# Patient Record
Sex: FEMALE | Race: BLACK OR AFRICAN AMERICAN | Employment: OTHER | URBAN - METROPOLITAN AREA
[De-identification: names, ages, dates, MRNs, and addresses within clinical notes are randomized per-mention and may not be internally consistent; named-entity substitution may affect disease eponyms.]

---

## 2017-06-22 ENCOUNTER — TELEPHONE (OUTPATIENT)
Dept: HEMATOLOGY | Age: 72
End: 2017-06-22

## 2017-06-22 NOTE — TELEPHONE ENCOUNTER
Called to remind patient about their appointment on 06/28/2017 3:00 pm at the [facility] in [facility].  Patient confirmed understanding.

## 2017-06-28 ENCOUNTER — HOSPITAL ENCOUNTER (OUTPATIENT)
Dept: LAB | Age: 72
Discharge: HOME | End: 2017-06-28

## 2017-06-28 ENCOUNTER — OFFICE VISIT (OUTPATIENT)
Dept: HEMATOLOGY | Age: 72
End: 2017-06-28

## 2017-06-28 VITALS
RESPIRATION RATE: 18 BRPM | HEART RATE: 82 BPM | OXYGEN SATURATION: 98 % | SYSTOLIC BLOOD PRESSURE: 110 MMHG | WEIGHT: 164.8 LBS | DIASTOLIC BLOOD PRESSURE: 55 MMHG | TEMPERATURE: 98.9 F | BODY MASS INDEX: 25.87 KG/M2 | HEIGHT: 67 IN

## 2017-06-28 DIAGNOSIS — K75.4 AUTOIMMUNE HEPATITIS (HCC): Primary | ICD-10-CM

## 2017-06-28 PROBLEM — I10 HYPERTENSION: Status: ACTIVE | Noted: 2017-06-28

## 2017-06-28 PROCEDURE — 99001 SPECIMEN HANDLING PT-LAB: CPT | Performed by: INTERNAL MEDICINE

## 2017-06-28 RX ORDER — PREDNISONE 20 MG/1
TABLET ORAL
COMMUNITY
End: 2017-06-28 | Stop reason: CLARIF

## 2017-06-28 RX ORDER — MYCOPHENOLATE MOFETIL 500 MG/1
500 TABLET ORAL 2 TIMES DAILY
COMMUNITY
End: 2018-06-07 | Stop reason: DRUGHIGH

## 2017-06-28 RX ORDER — HYDROCHLOROTHIAZIDE 12.5 MG/1
12.5 TABLET ORAL DAILY
COMMUNITY

## 2017-06-28 NOTE — PROGRESS NOTES
[address] [facility] MD Amando, ABRAHAM Manrique, MITUL Barillas NP        at [address]     [address] [place] [address], [address] Yanet Padron  22.     [phone]     FAX: [phone]    at [address], [address] [place] [address], [address]     [phone]     FAX: [phone]         Patient Care Team:  Eliza Ruiz MD as PCP - General (Family Practice)      Problem List  Date Reviewed: 6/28/2017          Codes Class Noted    Autoimmune hepatitis [facility]) ICD-10-CM: K75.4  ICD-9-CM: 571.42  6/28/2017        Hypertension ICD-10-CM: I10  ICD-9-CM: 401.9  6/28/2017                The physicians listed above have asked me to see Ngozi Alamo in consultation regarding Autoimmune Hepatitis. All medical records sent by the referring physicians were reviewed including imaging studies and pathology. The patient is a 80 y.o. Black female who was first noted to have abnormalities in liver transaminases in 2012 and then developed more profound elevation in liver enzymes and jaundice while she was visiting family in [facility]. She was hospitalized and transferred to [facility]. A liver biopsy was performed. The findings were consistent with severe AIH and she was treated with high prednisone and cellcept. She then moved back to [facility] and prednisone was eventually tapered off. She remains on low dose cellcept. Further evaluation of these abnormalities is not available to me at this time. She moved to [facility] in 2016. She had a Fibroscan in [place]. This demosntrated liver stiffness consistent with  No fibrosis.     The most recent laboratory studies indicate that the liver transaminases are normal, ALP is normal, tests of hepatic synthetic and metabolic function are   normal,     The patient has no symptoms which could be attributed to the liver disorder. The patient completes all daily activities without any functional limitations. The patient has not experienced fatigue, pain in the right side over the liver,     ALLERGIES  No Known Allergies    MEDICATIONS  Current Outpatient Prescriptions   Medication Sig    hydroCHLOROthiazide (HYDRODIURIL) 12.5 mg tablet Take 12.5 mg by mouth daily.  mycophenolate (CELLCEPT) 500 mg tablet Take 500 mg by mouth two (2) times a day. No current facility-administered medications for this visit. SYSTEM REVIEW NOT RELATED TO LIVER DISEASE OR REVIEWED ABOVE:  Constitution systems: Negative for fever, chills, weight gain, weight loss. Eyes: Negative for visual changes. ENT: Negative for sore throat, painful swallowing. Respiratory: Negative for cough, hemoptysis, SOB. Cardiology: Negative for chest pain, palpitations. GI:  Negative for constipation or diarrhea. : Negative for urinary frequency, dysuria, hematuria, nocturia. Skin: Negative for rash. Hematology: Negative for easy bruising, blood clots. Musculo-skelatal: Negative for back pain, muscle pain, weakness. Neurologic: Negative for headaches, dizziness, vertigo, memory problems not related to HE. Psychology: Negative for anxiety, depression. FAMILY HISTORY:  The father  of COPD. The mother  of breast cancer. There is no family history of liver disease. There is no family history of immune disorders. SOCIAL HISTORY:  The patient is . The patient has 1 child. The patient has never used tobacco products. The patient has never consumed significant amounts of alcohol. The patient used to work as a . The patient retired in .         PHYSICAL EXAMINATION:  Visit Vitals    /55 (BP 1 Location: Left arm, BP Patient Position: Sitting)    Pulse 82    Temp 98.9 °F (37.2 °C) (Tympanic)    Resp 18    Ht 5' 7\" (1.702 m)    Wt 164 lb 12.8 oz (74.8 kg)    SpO2 98%    BMI 25.81 kg/m2     General: No acute distress. Eyes: Sclera anicteric. ENT: No oral lesions. Thyroid normal.  Nodes: No adenopathy. Skin: No spider angiomata. No jaundice. No palmar erythema. Respiratory: Lungs clear to auscultation. Cardiovascular: Regular heart rate. No murmurs. No JVD. Abdomen: Soft non-tender. Liver size normal to percussion/palpation. Spleen not palpable. No obvious ascites. Extremities: No edema. No muscle wasting. No gross arthritic changes. Neurologic: Alert and oriented. Cranial nerves grossly intact. No asterixsis. LABORATORY STUDIES:  From 4/2016  AST/ALT/ALP/T Bili/ALB:  15/10/74/0.5/4.5  WBC/HB/PLT/INR:  5.9/12.2/225  BUN/CREAT:  10/0.79    SEROLOGIES:  Not available or performed. Testing will be performed as indicated. LIVER HISTOLOGY:  6/2012. Liver biopsy at [facility]. Severe hepatitis with cholestasis consistent with AIH.    1/2017. Fiboscan at [facility]. Consistent with F0. ENDOSCOPIC PROCEDURES:  Not available or performed    RADIOLOGY:  2/2012. Ultrasound of liver. Echogenic consistent with chronic liver disease. No liver mass lesions. No dilated bile ducts. No ascites. 5/2016. Ultrasound of liver. Echogenic consistent with chronic liver disease. No liver mass lesions. No dilated bile ducts. No ascites. OTHER TESTING:  Not available or performed    ASSESSMENT AND PLAN:  Autoimmune Hepatitis by liver biopsy in 2012. Liver transaminases are now normal.  Alkaline phosphate is normal.  Liver function is normal.  The platelet count is normal.      Based upon laboratory studies, Fibroscan and imaging  the patient may have none or some fibrosis. But this appears to be minimal.    The patient is receiving treatment with Cellcept. The patient is responding to and is tolerating the treatment without significant side effects. Will perform laboratory testing to monitor liver function and degree of liver injury.   This will include BMP, hepatic panel, CBC with platelet count,     Will perform serologic and virologic studies to assess for other causes of chronic liver disease. Will perform imaging of the liver with ultrasound. There is no reason to perform liver biopsy at this time. The patient was directed to continue all current medications at the current dosages. There are no contraindications for the patient to take any medications that are necessary for treatment of other medical issues. The patient was counseled regarding alcohol consumption. The need for vaccination against viral hepatitis A and B will be assessed with serologic and instituted as appropriate. All of the above issues were discussed with the patient. All questions were answered. The patient expressed a clear understanding of the above. [address] [place] [address] in 3 months to review all data and determine the treatment plan.      Elizabeth Washburn MD  [facility] [place] of [address] [place] [address] Tommy Ambreen Plata, [address]  [phone]

## 2017-06-28 NOTE — MR AVS SNAPSHOT
Visit Information Date & Time Provider Department Dept. Phone Encounter #  
 6/28/2017  3:00 PM Norlene Leyden, MD [facility] [place] [phone] [id number] Follow-up Instructions Return in about 3 months (around 9/28/2017) for MLS. Upcoming Health Maintenance Date Due Hepatitis C Screening 1945 DTaP/Tdap/Td series (1 - Tdap) 9/30/1967 BREAST CANCER SCRN MAMMOGRAM 9/30/1996 FOBT Q 1 YEAR AGE 50-75 9/30/1996 ZOSTER VACCINE AGE 60> 9/30/2006 GLAUCOMA SCREENING Q2Y 9/30/2011 OSTEOPOROSIS SCREENING (DEXA) 9/30/2011 Pneumococcal 65+ Low/Medium Risk (1 of 2 - PCV13) 9/30/2011 MEDICARE YEARLY EXAM 9/30/2011 INFLUENZA AGE 9 TO ADULT 8/1/2017 Allergies as of 6/28/2017  Review Complete On: 6/28/2017 By: Norlene Leyden, MD  
 No Known Allergies Current Immunizations  Never Reviewed No immunizations on file. Not reviewed this visit You Were Diagnosed With   
  
 Codes Comments [facility]    -  Primary ICD-10-CM: K75.4 ICD-9-CM: 571.42 Vitals BP Pulse Temp Resp Height(growth percentile) 110/55 (BP 1 Location: Left arm, BP Patient Position: Sitting) 82 98.9 °F (37.2 °C) (Tympanic) 18 5' 7\" (1.702 m) Weight(growth percentile) SpO2 BMI OB Status Smoking Status 164 lb 12.8 oz (74.8 kg) 98% 25.81 kg/m2 Menopause Former Smoker Vitals History BMI and BSA Data Body Mass Index Body Surface Area  
 25.81 kg/m 2 1.88 m 2 Your Updated Medication List  
  
   
This list is accurate as of: 6/28/17  3:30 PM.  Always use your most recent med list.  
  
  
  
  
 hydroCHLOROthiazide 12.5 mg tablet Commonly known as:  HYDRODIURIL Take 12.5 mg by mouth daily. mycophenolate 500 mg tablet Commonly known as:  CELLCEPT Take 500 mg by mouth two (2) times a day. Follow-up Instructions Return in about 3 months (around 9/28/2017) for MLS. To-Do List   
 06/28/2017 Lab:  ACTIN (SMOOTH MUSCLE) ANTIBODY   
  
 06/28/2017 Lab:  ANTINUCLEAR ANTIBODIES, IFA   
  
 06/28/2017 Lab:  CBC WITH AUTOMATED DIFF   
  
 06/28/2017 Lab:  HEPATIC FUNCTION PANEL   
  
 06/28/2017 Lab:  METABOLIC PANEL, BASIC Introducing [facility]! Apoorva Russ introduces MeeWee patient portal. Now you can access parts of your medical record, email your doctor's office, and request medication refills online. 1. In your internet browser, go to [email or web address] 2. Click on the First Time User? Click Here link in the Sign In box. You will see the New Member Sign Up page. 3. Enter your MeeWee Access Code exactly as it appears below. You will not need to use this code after youve completed the sign-up process. If you do not sign up before the expiration date, you must request a new code. · MeeWee Access Code: [email or web address] Expires: 9/26/2017  1:59 PM 
 
4. Enter the last four digits of your Social Security Number (xxxx) and Date of Birth (mm/dd/yyyy) as indicated and click Submit. You will be taken to the next sign-up page. 5. Create a MeeWee ID. This will be your MeeWee login ID and cannot be changed, so think of one that is secure and easy to remember. 6. Create a MeeWee password. You can change your password at any time. 7. Enter your Password Reset Question and Answer. This can be used at a later time if you forget your password. 8. Enter your e-mail address. You will receive e-mail notification when new information is available in [address] 9. Click Sign Up. You can now view and download portions of your medical record. 10. Click the Download Summary menu link to download a portable copy of your medical information. If you have questions, please visit the Frequently Asked Questions section of the MeeWee website. Remember, MeeWee is NOT to be used for urgent needs. For medical emergencies, dial 911. Now available from your iPhone and Android! Please provide this summary of care documentation to your next provider. Your primary care clinician is listed as Paris Starks. If you have any questions after today's visit, please call [phone].

## 2017-06-30 LAB
ACTIN IGG SERPL-ACNC: 31 UNITS (ref 0–19)
ALBUMIN SERPL-MCNC: 4.3 G/DL (ref 3.5–4.8)
ALP SERPL-CCNC: 85 IU/L (ref 39–117)
ALT SERPL-CCNC: 12 IU/L (ref 0–32)
ANA TITR SER IF: NEGATIVE {TITER}
AST SERPL-CCNC: 16 IU/L (ref 0–40)
BASOPHILS # BLD AUTO: 0 X10E3/UL (ref 0–0.2)
BASOPHILS NFR BLD AUTO: 0 %
BILIRUB DIRECT SERPL-MCNC: 0.08 MG/DL (ref 0–0.4)
BILIRUB SERPL-MCNC: 0.2 MG/DL (ref 0–1.2)
BUN SERPL-MCNC: 17 MG/DL (ref 8–27)
BUN/CREAT SERPL: 22 (ref 12–28)
CALCIUM SERPL-MCNC: 10 MG/DL (ref 8.7–10.3)
CHLORIDE SERPL-SCNC: 100 MMOL/L (ref 96–106)
CO2 SERPL-SCNC: 25 MMOL/L (ref 18–29)
CREAT SERPL-MCNC: 0.77 MG/DL (ref 0.57–1)
EOSINOPHIL # BLD AUTO: 0.1 X10E3/UL (ref 0–0.4)
EOSINOPHIL NFR BLD AUTO: 2 %
ERYTHROCYTE [DISTWIDTH] IN BLOOD BY AUTOMATED COUNT: 14.9 % (ref 12.3–15.4)
GLUCOSE SERPL-MCNC: 91 MG/DL (ref 65–99)
HCT VFR BLD AUTO: 38.5 % (ref 34–46.6)
HGB BLD-MCNC: 12.1 G/DL (ref 11.1–15.9)
IMM GRANULOCYTES # BLD: 0 X10E3/UL (ref 0–0.1)
IMM GRANULOCYTES NFR BLD: 0 %
LYMPHOCYTES # BLD AUTO: 1.8 X10E3/UL (ref 0.7–3.1)
LYMPHOCYTES NFR BLD AUTO: 25 %
MCH RBC QN AUTO: 26.5 PG (ref 26.6–33)
MCHC RBC AUTO-ENTMCNC: 31.4 G/DL (ref 31.5–35.7)
MCV RBC AUTO: 84 FL (ref 79–97)
MONOCYTES # BLD AUTO: 0.7 X10E3/UL (ref 0.1–0.9)
MONOCYTES NFR BLD AUTO: 11 %
NEUTROPHILS # BLD AUTO: 4.3 X10E3/UL (ref 1.4–7)
NEUTROPHILS NFR BLD AUTO: 62 %
PLATELET # BLD AUTO: 253 X10E3/UL (ref 150–379)
POTASSIUM SERPL-SCNC: 4.5 MMOL/L (ref 3.5–5.2)
PROT SERPL-MCNC: 7.3 G/DL (ref 6–8.5)
RBC # BLD AUTO: 4.57 X10E6/UL (ref 3.77–5.28)
SODIUM SERPL-SCNC: 139 MMOL/L (ref 134–144)
WBC # BLD AUTO: 7 X10E3/UL (ref 3.4–10.8)

## 2017-10-03 ENCOUNTER — HOSPITAL ENCOUNTER (OUTPATIENT)
Dept: LAB | Age: 72
Discharge: HOME | End: 2017-10-03

## 2017-10-03 PROCEDURE — 99001 SPECIMEN HANDLING PT-LAB: CPT | Performed by: INTERNAL MEDICINE

## 2017-10-04 LAB
ACTIN IGG SERPL-ACNC: NORMAL
ALBUMIN SERPL-MCNC: 4.4 G/DL (ref 3.5–4.8)
ALP SERPL-CCNC: 84 IU/L (ref 39–117)
ALT SERPL-CCNC: 12 IU/L (ref 0–32)
ANA TITR SER IF: NORMAL {TITER}
AST SERPL-CCNC: 17 IU/L (ref 0–40)
BASOPHILS # BLD AUTO: 0 X10E3/UL (ref 0–0.2)
BASOPHILS NFR BLD AUTO: 0 %
BILIRUB DIRECT SERPL-MCNC: 0.11 MG/DL (ref 0–0.4)
BILIRUB SERPL-MCNC: 0.4 MG/DL (ref 0–1.2)
BUN SERPL-MCNC: 12 MG/DL (ref 8–27)
BUN/CREAT SERPL: 15 (ref 12–28)
CALCIUM SERPL-MCNC: 10.2 MG/DL (ref 8.7–10.3)
CHLORIDE SERPL-SCNC: 102 MMOL/L (ref 96–106)
CO2 SERPL-SCNC: 27 MMOL/L (ref 18–29)
CREAT SERPL-MCNC: 0.81 MG/DL (ref 0.57–1)
EOSINOPHIL # BLD AUTO: 0.1 X10E3/UL (ref 0–0.4)
EOSINOPHIL NFR BLD AUTO: 2 %
ERYTHROCYTE [DISTWIDTH] IN BLOOD BY AUTOMATED COUNT: 15.2 % (ref 12.3–15.4)
GLUCOSE SERPL-MCNC: 89 MG/DL (ref 65–99)
HCT VFR BLD AUTO: 40.7 % (ref 34–46.6)
HGB BLD-MCNC: 12.9 G/DL (ref 11.1–15.9)
IMM GRANULOCYTES # BLD: 0 X10E3/UL (ref 0–0.1)
IMM GRANULOCYTES NFR BLD: 0 %
LYMPHOCYTES # BLD AUTO: 1.5 X10E3/UL (ref 0.7–3.1)
LYMPHOCYTES NFR BLD AUTO: 30 %
MCH RBC QN AUTO: 26.3 PG (ref 26.6–33)
MCHC RBC AUTO-ENTMCNC: 31.7 G/DL (ref 31.5–35.7)
MCV RBC AUTO: 83 FL (ref 79–97)
MONOCYTES # BLD AUTO: 0.7 X10E3/UL (ref 0.1–0.9)
MONOCYTES NFR BLD AUTO: 15 %
NEUTROPHILS # BLD AUTO: 2.6 X10E3/UL (ref 1.4–7)
NEUTROPHILS NFR BLD AUTO: 53 %
PLATELET # BLD AUTO: 247 X10E3/UL (ref 150–379)
POTASSIUM SERPL-SCNC: 5 MMOL/L (ref 3.5–5.2)
PROT SERPL-MCNC: 7.9 G/DL (ref 6–8.5)
RBC # BLD AUTO: 4.9 X10E6/UL (ref 3.77–5.28)
SODIUM SERPL-SCNC: 143 MMOL/L (ref 134–144)
WBC # BLD AUTO: 4.9 X10E3/UL (ref 3.4–10.8)

## 2017-10-05 LAB
ACTIN IGG SERPL-ACNC: 27 UNITS (ref 0–19)
ALBUMIN SERPL-MCNC: 4.4 G/DL (ref 3.5–4.8)
ALP SERPL-CCNC: 84 IU/L (ref 39–117)
ALT SERPL-CCNC: 12 IU/L (ref 0–32)
ANA SPECKLED TITR SER: ABNORMAL {TITER}
ANA TITR SER IF: POSITIVE {TITER}
AST SERPL-CCNC: 17 IU/L (ref 0–40)
BASOPHILS # BLD AUTO: 0 X10E3/UL (ref 0–0.2)
BASOPHILS NFR BLD AUTO: 0 %
BILIRUB DIRECT SERPL-MCNC: 0.11 MG/DL (ref 0–0.4)
BILIRUB SERPL-MCNC: 0.4 MG/DL (ref 0–1.2)
BUN SERPL-MCNC: 12 MG/DL (ref 8–27)
BUN/CREAT SERPL: 15 (ref 12–28)
CALCIUM SERPL-MCNC: 10.2 MG/DL (ref 8.7–10.3)
CHLORIDE SERPL-SCNC: 102 MMOL/L (ref 96–106)
CO2 SERPL-SCNC: 27 MMOL/L (ref 18–29)
CREAT SERPL-MCNC: 0.81 MG/DL (ref 0.57–1)
EOSINOPHIL # BLD AUTO: 0.1 X10E3/UL (ref 0–0.4)
EOSINOPHIL NFR BLD AUTO: 2 %
ERYTHROCYTE [DISTWIDTH] IN BLOOD BY AUTOMATED COUNT: 15.2 % (ref 12.3–15.4)
GLUCOSE SERPL-MCNC: 89 MG/DL (ref 65–99)
HCT VFR BLD AUTO: 40.7 % (ref 34–46.6)
HGB BLD-MCNC: 12.9 G/DL (ref 11.1–15.9)
IMM GRANULOCYTES # BLD: 0 X10E3/UL (ref 0–0.1)
IMM GRANULOCYTES NFR BLD: 0 %
LYMPHOCYTES # BLD AUTO: 1.5 X10E3/UL (ref 0.7–3.1)
LYMPHOCYTES NFR BLD AUTO: 30 %
Lab: ABNORMAL
MCH RBC QN AUTO: 26.3 PG (ref 26.6–33)
MCHC RBC AUTO-ENTMCNC: 31.7 G/DL (ref 31.5–35.7)
MCV RBC AUTO: 83 FL (ref 79–97)
MONOCYTES # BLD AUTO: 0.7 X10E3/UL (ref 0.1–0.9)
MONOCYTES NFR BLD AUTO: 15 %
NEUTROPHILS # BLD AUTO: 2.6 X10E3/UL (ref 1.4–7)
NEUTROPHILS NFR BLD AUTO: 53 %
PLATELET # BLD AUTO: 247 X10E3/UL (ref 150–379)
POTASSIUM SERPL-SCNC: 5 MMOL/L (ref 3.5–5.2)
PROT SERPL-MCNC: 7.9 G/DL (ref 6–8.5)
RBC # BLD AUTO: 4.9 X10E6/UL (ref 3.77–5.28)
SODIUM SERPL-SCNC: 143 MMOL/L (ref 134–144)
WBC # BLD AUTO: 4.9 X10E3/UL (ref 3.4–10.8)

## 2017-10-18 ENCOUNTER — OFFICE VISIT (OUTPATIENT)
Dept: HEMATOLOGY | Age: 72
End: 2017-10-18

## 2017-10-18 VITALS
SYSTOLIC BLOOD PRESSURE: 131 MMHG | HEIGHT: 67 IN | TEMPERATURE: 99 F | DIASTOLIC BLOOD PRESSURE: 76 MMHG | BODY MASS INDEX: 24.64 KG/M2 | HEART RATE: 74 BPM | RESPIRATION RATE: 16 BRPM | OXYGEN SATURATION: 97 % | WEIGHT: 157 LBS

## 2017-10-18 DIAGNOSIS — K75.4 AUTOIMMUNE HEPATITIS (HCC): Primary | ICD-10-CM

## 2017-10-18 RX ORDER — AZITHROMYCIN 250 MG/1
TABLET, FILM COATED ORAL
Refills: 0 | COMMUNITY
Start: 2017-10-17 | End: 2018-11-15

## 2017-10-18 NOTE — PROGRESS NOTES
Patient states that she had multiple bruises that lasted over 3 days with no particular onset. Marika Zuniga is a 71 y.o. female    No chief complaint on file. 1. Have you been to the ER, urgent care clinic or hospitalized since your last visit? NO.     2. Have you seen or consulted any other health care providers outside of the [address] [place] [address] since your last visit (Include any pap smears or colon screening)?  NO  Learning Assessment 6/28/2017   PRIMARY LEARNER Patient   PRIMARY LANGUAGE ENGLISH   LEARNER PREFERENCE PRIMARY DEMONSTRATION   ANSWERED BY self   RELATIONSHIP SELF

## 2017-10-18 NOTE — PROGRESS NOTES
[address] Tadeo MD, [address], [facility] [place]       ABRAHAM Rios PA-C Lequita Duff, JOAQUIN-ABRAHAM Leija NP        at [address], [address] [facility], [address]     [phone]     FAX: [phone]    at [address], [address], [address]     [phone]     FAX: [phone]       Patient Care Team:  Lucio Rock MD (Inactive) as PCP - General (Family Practice)      Problem List  Date Reviewed: 6/28/2017          Codes Class Noted    Autoimmune hepatitis [facility]) ICD-10-CM: K75.4  ICD-9-CM: 571.42  6/28/2017        Hypertension ICD-10-CM: I10  ICD-9-CM: 401.9  6/28/2017              The physicians listed above have asked me to see Nicole Aragon in consultation regarding Autoimmune Hepatitis. All medical records sent by the referring physicians were reviewed including imaging studies and pathology. The patient is a 71 y.o. Black female who was first noted to have abnormalities in liver transaminases in 2012 and then developed more profound elevation in liver enzymes and jaundice while she was visiting family in [facility]. [facility] A [facility] was performed. The findings were consistent with severe AIH and she was treated with high dose prednisone and cellcept. She then moved back to the [facility] and prednisone was eventually tapered off. She remains on low dose cellcept. She moved back to [facility] in 2016. She had a Fibroscan in [place]. This demosntrated liver stiffness consistent with No fibrosis.     The most recent laboratory studies indicate that the liver transaminases are normal, ALP is normal, tests of hepatic synthetic and metabolic function are normal,     The patient has no symptoms which could be attributed to the liver disorder. The patient completes all daily activities without any functional limitations. The patient has not experienced fatigue, pain in the right side over the liver,       ALLERGIES  No Known Allergies    MEDICATIONS  Current Outpatient Prescriptions   Medication Sig    azithromycin (ZITHROMAX) 250 mg tablet take 2 tablets by mouth today then take 1 tablet DAILY FOR 4 DAYS    hydroCHLOROthiazide (HYDRODIURIL) 12.5 mg tablet Take 12.5 mg by mouth daily.  mycophenolate (CELLCEPT) 500 mg tablet Take 500 mg by mouth two (2) times a day. No current facility-administered medications for this visit. SYSTEM REVIEW NOT RELATED TO LIVER DISEASE OR REVIEWED ABOVE:  Constitution systems: Negative for fever, chills, weight gain, weight loss. Eyes: Negative for visual changes. ENT: Negative for sore throat, painful swallowing. Respiratory: Negative for cough, hemoptysis, SOB. Cardiology: Negative for chest pain, palpitations. GI:  Negative for constipation or diarrhea. : Negative for urinary frequency, dysuria, hematuria, nocturia. Skin: Negative for rash. Hematology: Negative for easy bruising, blood clots. Musculo-skelatal: Negative for back pain, muscle pain, weakness. Neurologic: Negative for headaches, dizziness, vertigo, memory problems not related to HE. Psychology: Negative for anxiety, depression. FAMILY HISTORY:  The father  of COPD. The mother  of breast cancer. There is no family history of liver disease. There is no family history of immune disorders. SOCIAL HISTORY:  The patient is . The patient has 1 child. The patient has never used tobacco products. The patient has never consumed significant amounts of alcohol. The patient used to work as a . The patient retired in .         PHYSICAL EXAMINATION:  Visit Vitals    /76 (BP 1 Location: Left arm, BP Patient Position: Sitting)    Pulse 74    Temp 99 °F (37.2 °C) (Tympanic)    Resp 16    Ht 5' 7\" (1.702 m)    Wt 157 lb (71.2 kg)    SpO2 97%    BMI 24.59 kg/m2     General: No acute distress. Eyes: Sclera anicteric. ENT: No oral lesions. Thyroid normal.  Nodes: No adenopathy. Skin: No spider angiomata. No jaundice. No palmar erythema. Respiratory: Lungs clear to auscultation. Cardiovascular: Regular heart rate. No murmurs. No JVD. Abdomen: Soft non-tender. Liver size normal to percussion/palpation. Spleen not palpable. No obvious ascites. Extremities: No edema. No muscle wasting. No gross arthritic changes. Neurologic: Alert and oriented. Cranial nerves grossly intact. No asterixsis. LABORATORY STUDIES:  [facility] [place] of [address] Units 10/3/2017 6/28/2017   WBC 3.4 - 10.8 x10E3/uL 4.9 7.0   ANC 1.4 - 7.0 x10E3/uL 2.6 4.3   HGB 11.1 - 15.9 g/dL 12.9 12.1    - 379 x10E3/uL 247 253   AST 0 - 40 IU/L 17 16   ALT 0 - 32 IU/L 12 12   Alk Phos 39 - 117 IU/L 84 85   Bili, Total 0.0 - 1.2 mg/dL 0.4 0.2   Bili, Direct 0.00 - 0.40 mg/dL 0.11 0.08   Albumin 3.5 - 4.8 g/dL 4.4 4.3   BUN 8 - 27 mg/dL 12 17   Creat 0.57 - 1.00 mg/dL 0.81 0.77   Na 134 - 144 mmol/L 143 139   K 3.5 - 5.2 mmol/L 5.0 4.5   Cl 96 - 106 mmol/L 102 100   CO2 18 - 29 mmol/L 27 25   Glucose 65 - 99 mg/dL 89 91     SEROLOGIES:  Not available or performed. Testing will be performed as indicated. LIVER HISTOLOGY:  6/2012. Liver biopsy at [facility]. Severe hepatitis with cholestasis consistent with AIH.    1/2017. Fiboscan at [facility]. Consistent with F0. ENDOSCOPIC PROCEDURES:  Not available or performed    RADIOLOGY:  2/2012. Ultrasound of liver. Echogenic consistent with chronic liver disease. No liver mass lesions. No dilated bile ducts. No ascites. 5/2016. Ultrasound of liver. Echogenic consistent with chronic liver disease. No liver mass lesions. No dilated bile ducts. No ascites.     OTHER TESTING:  Not available or performed    ASSESSMENT AND PLAN:  Autoimmune Hepatitis by liver biopsy in 2012. Liver transaminases are now normal.  Alkaline phosphate is normal.  Liver function is normal.  The platelet count is normal.      Based upon laboratory studies, Fibroscan and imaging  the patient may have none or some fibrosis. But this appears to be minimal.    The patient is receiving treatment with Cellcept. The patient is responding to and is tolerating the treatment without significant side effects. Will perform laboratory testing to monitor liver function and degree of liver injury. This will include BMP, hepatic panel, CBC with platelet count,     There is no reason to perform liver biopsy at this time. The patient was directed to continue all current medications at the current dosages. There are no contraindications for the patient to take any medications that are necessary for treatment of other medical issues. The patient was counseled regarding alcohol consumption. The need for vaccination against viral hepatitis A and B will be assessed with serologic and instituted as appropriate. All of the above issues were discussed with the patient. All questions were answered. The patient expressed a clear understanding of the above. [address] [place] [address] in 3 months to review all data and determine the treatment plan.      August Pablo MD  [facility] [place] [facility] [address], [address] [facility], [address]  [phone]

## 2017-10-23 ENCOUNTER — OFFICE VISIT (OUTPATIENT)
Dept: ORTHOPEDIC SURGERY | Age: 72
End: 2017-10-23

## 2017-10-23 VITALS — DIASTOLIC BLOOD PRESSURE: 71 MMHG | OXYGEN SATURATION: 96 % | HEART RATE: 79 BPM | SYSTOLIC BLOOD PRESSURE: 127 MMHG

## 2017-10-23 DIAGNOSIS — M19.071 ARTHRITIS OF RIGHT FOOT: Primary | ICD-10-CM

## 2017-10-23 RX ORDER — DICLOFENAC SODIUM 10 MG/G
4 GEL TOPICAL 2 TIMES DAILY
Qty: 100 G | Refills: 1 | Status: SHIPPED | OUTPATIENT
Start: 2017-10-23

## 2017-10-23 NOTE — PROGRESS NOTES
AMBULATORY PROGRESS NOTE      Patient: Clayton Gonzalez             MRN: [id number]     SSN: [id number] There is no height or weight on file to calculate BMI. YOB: 1945     AGE: 71 y.o. EX: female    PCP: Trina La MD (Inactive)    IMPRESSION/DIAGNOSIS AND TREATMENT PLAN     DIAGNOSES  1. Arthritis of right foot        Orders Placed This Encounter    diclofenac (VOLTAREN) 1 % gel      Clayton Gonzalez understands her diagnoses and the proposed plan. Plan:    1) Voltaren 1% Gel: 4 g BID; 100 g, 1 refill. RTO - 6 weeks    HPI AND EXAMINATION     Clayton Gonzalez IS A 71 y.o. female who presents to my outpatient office complaining of right foot pain. The patient came into the office with a CC of dorsolateral right foot pain. The patient notes the pain has been going on for 8 weeks intermittently. Ms. Mary Hua notes the fullness along her right midfoot has increased in size over time. She does note it can decrease in size when the right foot is not eliciting pain. She went to [facility] and brought some x-rays on CD/DVD. I did review the x-rays. These showed some osteopenia. It is a little bit hard to see the medial sesamoid. I saw no fracture, subluxation, or dislocation. There is a moderate bunion deformity on these nonweightbearing x-rays. The official report revealed negative for acute fracture or dislocation. There is mild hallucis valgus deformity with bunion formation, minimal degenerative changes to the first MTP. There is a small calcium bone spur at the inferior portion of the hindfoot near the plantar fascial origin. Her chief complaint has remained some dorsolateral foot pain. She has no history of fevers, shakes, chills, night sweats, and no history of trauma. My overall impression is a dorsal bone spur with a possible ganglion cyst over the number four tarsometatarsal joint articulation region. Recommendations are listed as above. Mallory Norman is alert/oriented (name, location, time) and follows commands well. she  is in no acute distress and her affect and mood are appropriate. Right ANKLE and FOOT       Gait: slow  Tenderness: mild tenderness over fullness on third metatarsal.   Cutaneous: No rashes, skin patches, wounds, or abrasions to the lower legs           Warm and Normal color. No regions of expressible drainage. Medial Border of Tibia Region: absent           Skin color, texture, turgor normal. Normal.           Fullness over 3rd TMT joint  Joint Motion: ROM Ankle:Normal , Hindfoot: (ST,TN,CC Normal}, Forefoot toes:Normal  Neurologic Exam: Neuro: Motor: normal 5/5 strength in all tested muscle groups and Sensory : no sensory deficits noted. Contractures: Gastrocnemius or Achilles Contractures absent  Joint / Tendon Stability: No Ankle or Subtalar instability or joint laxity. No peroneal sublux ability or dislocation  Alignment:  Normal Foot Alignment and  Flexible  Vascular: Normal Pulses/ NL Capillary refill, No evidence of DVT seen on physical exam.   No calf swelling, no tenderness to calf muscles. Lymphatic:  No Evidence of Lymphedema. CHART REVIEW     History reviewed. No pertinent past medical history. Current Outpatient Prescriptions   Medication Sig    diclofenac (VOLTAREN) 1 % gel Apply 4 g to affected area two (2) times a day. Apply to affected area as directed.  hydroCHLOROthiazide (HYDRODIURIL) 12.5 mg tablet Take 12.5 mg by mouth daily.  mycophenolate (CELLCEPT) 500 mg tablet Take 500 mg by mouth two (2) times a day.  azithromycin (ZITHROMAX) 250 mg tablet take 2 tablets by mouth today then take 1 tablet DAILY FOR 4 DAYS     No current facility-administered medications for this visit. No Known Allergies  History reviewed. No pertinent surgical history. Social History     Occupational History    Not on file.      Social History Main Topics    Smoking status: Former Smoker    Smokeless tobacco: Never Used    Alcohol use No    Drug use: Not on file    Sexual activity: Not on file     No family history on file. REVIEW OF SYSTEMS : 10/23/2017  ALL BELOW ARE Negative except : SEE HPI       Constitutional: Negative for fever, chills and weight loss. Neg Weigh Loss  Cardiovascular: Negative for chest pain, claudication and leg swelling. SOB, JOINER   Gastrointestinal: Negative for  pain, N/V/D/C, Blood in stool or urine,dysuria, hematuria,        Incontinence, pelvic pain  Musculoskeletal: see HPI. Neurological: Negative for dizziness and weakness. Negative for headaches,Visual Changes, Confusion, Seizures,   Psychiatric/Behavioral: Negative for depression, memory loss and substance abuse. Extremities:  Negative for  hair changes, rash or skin lesion changes. Hematologic: Negative for Bleeding problems, bruising, pallor or swollen lymph nodes. Peripheral Vascular: No calf pain, vascular vein tenderness to calf pain              No calf throbbing, posterior knee throbbing pain    DIAGNOSTIC IMAGING     No notes on file    Written by Thelma Soliman, as dictated by Antonieta Colbert MD. IDr., Antonieta Colbert MD, confirm that all documentation is accurate.

## 2017-10-23 NOTE — PATIENT INSTRUCTIONS
Please follow up in 6 weeks. You are advised to contact us if your condition worsens. Foot Pain: Care Instructions  Your Care Instructions  Foot injuries that cause pain and swelling are fairly common. Almost all sports or home repair projects can cause a misstep that ends up as foot pain. Normal wear and tear, especially as you get older, also can cause foot pain. Most minor foot injuries will heal on their own, and home treatment is usually all you need to do. If you have a severe injury, you may need tests and treatment. Follow-up care is a key part of your treatment and safety. Be sure to make and go to all appointments, and call your doctor if you are having problems. Its also a good idea to know your test results and keep a list of the medicines you take. How can you care for yourself at home? · Take pain medicines exactly as directed. ¨ If the doctor gave you a prescription medicine for pain, take it as prescribed. ¨ If you are not taking a prescription pain medicine, ask your doctor if you can take an over-the-counter medicine. · Rest and protect your foot. Take a break from any activity that may cause pain. · Put ice or a cold pack on your foot for 10 to 20 minutes at a time. Put a thin cloth between the ice and your skin. · Prop up the sore foot on a pillow when you ice it or anytime you sit or lie down during the next 3 days. Try to keep it above the level of your heart. This will help reduce swelling. · Your doctor may recommend that you wrap your foot with an elastic bandage. Keep your foot wrapped for as long as your doctor advises. · If your doctor recommends crutches, use them as directed. · Wear roomy footwear. · As soon as pain and swelling end, begin gentle exercises of your foot. Your doctor can tell you which exercises will help. When should you call for help? Call 911 anytime you think you may need emergency care.  For example, call if:  · Your foot turns pale, white, blue, or cold. Call your doctor now or seek immediate medical care if:  · You cannot move or stand on your foot. · Your foot looks twisted or out of its normal position. · Your foot is not stable when you step down. · You have signs of infection, such as:  ¨ Increased pain, swelling, warmth, or redness. ¨ Red streaks leading from the sore area. ¨ Pus draining from a place on your foot. ¨ A fever. · Your foot is numb or tingly. Watch closely for changes in your health, and be sure to contact your doctor if:  · You do not get better as expected. · You have bruises from an injury that last longer than 2 weeks. Where can you learn more? Go to http://baljinder-tony.info/. Enter S580 in the search box to learn more about \"Foot Pain: Care Instructions. \"  Current as of: March 21, 2017  Content Version: 11.3  © 0328-3290 Netgen. Care instructions adapted under license by "[employer] (which disclaims liability or warranty for this information). If you have questions about a medical condition or this instruction, always ask your healthcare professional. [place] [address] any warranty or liability for your use of this information.

## 2017-10-23 NOTE — MR AVS SNAPSHOT
Visit Information Date & Time Provider Department Dept. Phone Encounter #  
 10/23/2017  2:30 PM Netta Hernandez, [address] [facility]  Your Appointments 4/19/2018  4:15 PM  
Follow [place] [address] [facility] ([address]) Appt Note: 6mnth f/up [facility], [place] [address] [facility] [address]  
  
   
 [facility], [address] [place] [address] Upcoming Health Maintenance Date Due Hepatitis C Screening 1945 DTaP/Tdap/Td series (1 - Tdap) 9/30/1967 BREAST CANCER SCRN MAMMOGRAM 9/30/1996 FOBT Q 1 YEAR AGE 50-75 9/30/1996 ZOSTER VACCINE AGE 60> 7/30/2006 GLAUCOMA SCREENING Q2Y 9/30/2011 OSTEOPOROSIS SCREENING (DEXA) 9/30/2011 Pneumococcal 65+ Low/Medium Risk (1 of 2 - PCV13) 9/30/2011 MEDICARE YEARLY EXAM 9/30/2011 INFLUENZA AGE 9 TO ADULT 8/1/2017 Allergies as of 10/23/2017  Review Complete On: 10/23/2017 By: Lin Hall No Known Allergies Current Immunizations  Never Reviewed No immunizations on file. Not reviewed this visit Vitals BP Pulse SpO2 OB Status Smoking Status 127/71 79 96% Menopause Former Smoker Preferred Pharmacy Pharmacy Name Phone [address] [facility], [address] [place] [address] Your Updated Medication List  
  
   
This list is accurate as of: 10/23/17  3:30 PM.  Always use your most recent med list.  
  
  
  
  
 azithromycin 250 mg tablet Commonly known as:  ZITHROMAX  
take 2 tablets by mouth today then take 1 tablet DAILY FOR 4 DAYS  
  
 hydroCHLOROthiazide 12.5 mg tablet Commonly known as:  HYDRODIURIL Take 12.5 mg by mouth daily. mycophenolate 500 mg tablet Commonly known as:  CELLCEPT Take 500 mg by mouth two (2) times a day. Patient Instructions Please follow up in 6 weeks. You are advised to contact us if your condition worsens. Foot Pain: Care Instructions Your Care Instructions Foot injuries that cause pain and swelling are fairly common. Almost all sports or home repair projects can cause a misstep that ends up as foot pain. Normal wear and tear, especially as you get older, also can cause foot pain. Most minor foot injuries will heal on their own, and home treatment is usually all you need to do. If you have a severe injury, you may need tests and treatment. Follow-up care is a key part of your treatment and safety. Be sure to make and go to all appointments, and call your doctor if you are having problems. Its also a good idea to know your test results and keep a list of the medicines you take. How can you care for yourself at home? · Take pain medicines exactly as directed. ¨ If the doctor gave you a prescription medicine for pain, take it as prescribed. ¨ If you are not taking a prescription pain medicine, ask your doctor if you can take an over-the-counter medicine. · Rest and protect your foot. Take a break from any activity that may cause pain. · Put ice or a cold pack on your foot for 10 to 20 minutes at a time. Put a thin cloth between the ice and your skin. · Prop up the sore foot on a pillow when you ice it or anytime you sit or lie down during the next 3 days. Try to keep it above the level of your heart. This will help reduce swelling. · Your doctor may recommend that you wrap your foot with an elastic bandage. Keep your foot wrapped for as long as your doctor advises. · If your doctor recommends crutches, use them as directed. · Wear roomy footwear. · As soon as pain and swelling end, begin gentle exercises of your foot. Your doctor can tell you which exercises will help. When should you call for help? Call 911 anytime you think you may need emergency care. For example, call if: · Your foot turns pale, white, blue, or cold. Call your doctor now or seek immediate medical care if: 
· You cannot move or stand on your foot. · Your foot looks twisted or out of its normal position. · Your foot is not stable when you step down. · You have signs of infection, such as: 
¨ Increased pain, swelling, warmth, or redness. ¨ Red streaks leading from the sore area. ¨ Pus draining from a place on your foot. ¨ A fever. · Your foot is numb or tingly. Watch closely for changes in your health, and be sure to contact your doctor if: 
· You do not get better as expected. · You have bruises from an injury that last longer than 2 weeks. Where can you learn more? Go to http://baljinder-tony.info/. Enter D938 in the search box to learn more about \"Foot Pain: Care Instructions. \" Current as of: March 21, 2017 Content Version: 11.3 © 0394-2858 PublishThis. Care instructions adapted under license by [employer] (which disclaims liability or warranty for this information). If you have questions about a medical condition or this instruction, always ask your healthcare professional. [place] [address] any warranty or liability for your use of this information. Introducing \[facility]\"" & HEALTH SERVICES! [facility] introduces Semmle patient portal. Now you can access parts of your medical record, email your doctor's office, and request medication refills online. 1. In your internet browser, go to [email or web address] 2. Click on the First Time User? Click Here link in the Sign In box. You will see the New Member Sign Up page. 3. Enter your Semmle Access Code exactly as it appears below. You will not need to use this code after youve completed the sign-up process. If you do not sign up before the expiration date, you must request a new code. · Semmle Access Code: [email or web address] Expires: 1/21/2018  3:30 PM 
 
 4. Enter the last four digits of your Social Security Number (xxxx) and Date of Birth (mm/dd/yyyy) as indicated and click Submit. You will be taken to the next sign-up page. 5. Create a Vanderdroid ID. This will be your Vanderdroid login ID and cannot be changed, so think of one that is secure and easy to remember. 6. Create a Vanderdroid password. You can change your password at any time. 7. Enter your Password Reset Question and Answer. This can be used at a later time if you forget your password. 8. Enter your e-mail address. You will receive e-mail notification when new information is available in [address] 9. Click Sign Up. You can now view and download portions of your medical record. 10. Click the Download Summary menu link to download a portable copy of your medical information. If you have questions, please visit the Frequently Asked Questions section of the Vanderdroid website. Remember, Vanderdroid is NOT to be used for urgent needs. For medical emergencies, dial 911. Now available from your iPhone and Android! Please provide this summary of care documentation to your next provider. Your primary care clinician is listed as Jw Talbert. If you have any questions after today's visit, please call [phone].

## 2017-11-30 ENCOUNTER — HOSPITAL ENCOUNTER (OUTPATIENT)
Dept: MAMMOGRAPHY | Age: 72
Discharge: HOME | End: 2017-11-30
Attending: NURSE PRACTITIONER
Payer: MEDICARE

## 2017-11-30 DIAGNOSIS — Z12.31 VISIT FOR SCREENING MAMMOGRAM: ICD-10-CM

## 2017-11-30 PROCEDURE — 77063 BREAST TOMOSYNTHESIS BI: CPT

## 2018-04-19 ENCOUNTER — OFFICE VISIT (OUTPATIENT)
Dept: HEMATOLOGY | Age: 73
End: 2018-04-19

## 2018-04-19 ENCOUNTER — HOSPITAL ENCOUNTER (OUTPATIENT)
Dept: LAB | Age: 73
Discharge: HOME | End: 2018-04-19

## 2018-04-19 VITALS
RESPIRATION RATE: 12 BRPM | TEMPERATURE: 98 F | HEIGHT: 67 IN | BODY MASS INDEX: 25.11 KG/M2 | WEIGHT: 160 LBS | OXYGEN SATURATION: 93 % | HEART RATE: 86 BPM | SYSTOLIC BLOOD PRESSURE: 138 MMHG | DIASTOLIC BLOOD PRESSURE: 78 MMHG

## 2018-04-19 DIAGNOSIS — K75.4 AUTOIMMUNE HEPATITIS (HCC): Primary | ICD-10-CM

## 2018-04-19 PROCEDURE — 99001 SPECIMEN HANDLING PT-LAB: CPT | Performed by: NURSE PRACTITIONER

## 2018-04-19 NOTE — PROGRESS NOTES
1. Have you been to the ER, urgent care clinic since your last visit? Hospitalized since your last visit? No    2. Have you seen or consulted any other health care providers outside of the [address] [place] [address] since your last visit? Include any pap smears or colon screening.  No

## 2018-04-19 NOTE — MR AVS SNAPSHOT
[address] [place] [address] 
[phone] Patient: Phil Alcantara MRN: [id number] YNU:[birth year] Visit Information Date & Time Provider Department Dept. Phone Encounter #  
 4/19/2018  4:15 PM ABRAHAM Daley [address] [id number] Follow-up Instructions Return in about 6 months (around 10/19/2018) for Seun. Upcoming Health Maintenance Date Due Hepatitis C Screening 1945 DTaP/Tdap/Td series (1 - Tdap) 9/30/1967 FOBT Q 1 YEAR AGE 50-75 9/30/1996 ZOSTER VACCINE AGE 60> 7/30/2006 GLAUCOMA SCREENING Q2Y 9/30/2011 Bone Densitometry (Dexa) Screening 9/30/2011 Pneumococcal 65+ Low/Medium Risk (1 of 2 - PCV13) 9/30/2011 Influenza Age 5 to Adult 8/1/2017 MEDICARE YEARLY EXAM 3/14/2018 BREAST CANCER SCRN MAMMOGRAM 11/30/2019 Allergies as of 4/19/2018  Review Complete On: 4/19/2018 By: Cheryle Coats No Known Allergies Current Immunizations  Never Reviewed No immunizations on file. Not reviewed this visit You Were Diagnosed With   
  
 Codes Comments Autoimmune hepatitis ([facility] [address]    -  Primary ICD-10-CM: K75.4 ICD-9-CM: 571.42 Vitals BP Pulse Temp Resp Height(growth percentile) Weight(growth percentile) 138/78 (BP 1 Location: Left arm, BP Patient Position: Sitting) 86 98 °F (36.7 °C) (Tympanic) 12 5' 7\" (1.702 m) 160 lb (72.6 kg) SpO2 BMI OB Status Smoking Status 93% 25.06 kg/m2 Menopause Former Smoker BMI and BSA Data Body Mass Index Body Surface Area 25.06 kg/m 2 1.85 m 2 Preferred Pharmacy Pharmacy Name Phone [address] [facility] [address], [address] [facility] [address] Your Updated Medication List  
  
   
This list is accurate as of 4/19/18  4:38 PM.  Always use your most recent med list.  
  
  
  
  
 azithromycin 250 mg tablet Commonly known as:  Ibrahima Lyly  
take 2 tablets by mouth today then take 1 tablet DAILY FOR 4 DAYS  
  
 CALCIUM 600 + D 600-125 mg-unit Tab Generic drug:  calcium-cholecalciferol (d3) Take  by mouth. diclofenac 1 % Gel Commonly known as:  VOLTAREN Apply 4 g to affected area two (2) times a day. Apply to affected area as directed. hydroCHLOROthiazide 12.5 mg tablet Commonly known as:  HYDRODIURIL Take 12.5 mg by mouth daily. mycophenolate 500 mg tablet Commonly known as:  CELLCEPT Take 500 mg by mouth two (2) times a day. Follow-up Instructions Return in about 6 months (around 10/19/2018) for Seun. To-Do List   
 04/19/2018 Lab:  CBC WITH AUTOMATED DIFF   
  
 04/19/2018 Lab:  HEPATIC FUNCTION PANEL   
  
 04/19/2018 Lab:  METABOLIC PANEL, BASIC   
  
 04/19/2018 Imaging:  US ABD COMP W ELASTOGRAPHY Introducing [facility] & HEALTH SERVICES! [employer] introduces Submittable patient portal. Now you can access parts of your medical record, email your doctor's office, and request medication refills online. 1. In your internet browser, go to [email or web address] 2. Click on the First Time User? Click Here link in the Sign In box. You will see the New Member Sign Up page. 3. Enter your Submittable Access Code exactly as it appears below. You will not need to use this code after youve completed the sign-up process. If you do not sign up before the expiration date, you must request a new code. · Submittable Access Code: [email or web address] Expires: 7/18/2018  4:37 PM 
 
4. Enter the last four digits of your Social Security Number (xxxx) and Date of Birth (mm/dd/yyyy) as indicated and click Submit. You will be taken to the next sign-up page. 5. Create a Submittable ID. This will be your CORP80t login ID and cannot be changed, so think of one that is secure and easy to remember. 6. Create a [employer] password. You can change your password at any time. 7. Enter your Password Reset Question and Answer. This can be used at a later time if you forget your password. 8. Enter your e-mail address. You will receive e-mail notification when new information is available in [address] 9. Click Sign Up. You can now view and download portions of your medical record. 10. Click the Download Summary menu link to download a portable copy of your medical information. If you have questions, please visit the Frequently Asked Questions section of the [employer] website. Remember, [employer] is NOT to be used for urgent needs. For medical emergencies, dial 911. Now available from your iPhone and Android! Please provide this summary of care documentation to your next provider. Your primary care clinician is listed as Dinh Rogers. If you have any questions after today's visit, please call [phone].

## 2018-04-19 NOTE — PROGRESS NOTES
[address] MD, [place], [facility], [place], [place]       ABRAHAM Grullon PA-C Alveda Spikes, [facility]   ABRAHAM Lanza NP        at [address], [address] Yanet Padron  22.     [phone]     FAX: [phone]    at [address], [address]     [phone]     FAX: [phone]       Patient Care Team:  Jaleesa Mckeon MD as PCP - General (General Practice)      Problem List  Date Reviewed: 1/16/2018          Codes Class Noted    Autoimmune hepatitis [facility]) ICD-10-CM: K75.4  ICD-9-CM: 571.42  6/28/2017        Hypertension ICD-10-CM: I10  ICD-9-CM: 401.9  6/28/2017              Jaz Santamaria returns to the [place] [address] for management of autoimmune hepatitis. The active problem list, all pertinent past medical history, medications, liver histology, radiologic findings, and laboratory findings related to the liver disorder were reviewed with the patient. The patient is a 71 y.o. Black female who was first noted to have abnormalities in liver transaminases in 2012 and then developed more profound elevation in liver enzymes and jaundice while she was visiting family in [facility]. She was hospitalized and transferred to [facility]. A liver biopsy was performed. The findings were consistent with severe AIH and she was treated with high dose prednisone and cellcept. She then moved back to the [facility] and prednisone was eventually tapered off. She remains on low dose cellcept. She moved back to [facility] in 2016. She had a Fibroscan in [facility]. This demonstrated liver stiffness consistent with no fibrosis.      The most recent laboratory studies indicate that the liver transaminases are normal, ALP is normal, tests of hepatic synthetic and metabolic function are normal, The patient has no symptoms which could be attributed to the liver disorder. The patient completes all daily activities without any functional limitations. The patient has not experienced fatigue, pain in the right side over the liver. ALLERGIES  No Known Allergies    MEDICATIONS  Current Outpatient Prescriptions   Medication Sig    calcium-cholecalciferol, d3, (CALCIUM 600 + D) 600-125 mg-unit tab Take  by mouth.  diclofenac (VOLTAREN) 1 % gel Apply 4 g to affected area two (2) times a day. Apply to affected area as directed.  hydroCHLOROthiazide (HYDRODIURIL) 12.5 mg tablet Take 12.5 mg by mouth daily.  mycophenolate (CELLCEPT) 500 mg tablet Take 500 mg by mouth two (2) times a day.  azithromycin (ZITHROMAX) 250 mg tablet take 2 tablets by mouth today then take 1 tablet DAILY FOR 4 DAYS     No current facility-administered medications for this visit. SYSTEM REVIEW NOT RELATED TO LIVER DISEASE OR REVIEWED ABOVE:  Constitution systems: Negative for fever, chills, weight gain, weight loss. Eyes: Negative for visual changes. ENT: Negative for sore throat, painful swallowing. Respiratory: Negative for cough, hemoptysis, SOB. Cardiology: Negative for chest pain, palpitations. GI:  Negative for constipation or diarrhea. : Negative for urinary frequency, dysuria, hematuria, nocturia. Skin: Negative for rash. Hematology: Negative for easy bruising, blood clots. Musculo-skelatal: Negative for back pain, muscle pain, weakness. Neurologic: Negative for headaches, dizziness, vertigo, memory problems not related to HE. Psychology: Negative for anxiety, depression. FAMILY HISTORY:  The father  of COPD. The mother  of breast cancer. There is no family history of liver disease. There is no family history of immune disorders. SOCIAL HISTORY:  The patient is . The patient has 1 child. The patient has never used tobacco products.     The patient has never consumed significant amounts of alcohol. The patient used to work as a . The patient retired in 2014. PHYSICAL EXAMINATION:  Visit Vitals    /78 (BP 1 Location: Left arm, BP Patient Position: Sitting)    Pulse 86    Temp 98 °F (36.7 °C) (Tympanic)    Resp 12    Ht 5' 7\" (1.702 m)    Wt 160 lb (72.6 kg)    SpO2 93%    BMI 25.06 kg/m2     General: No acute distress. Eyes: Sclera anicteric. ENT: No oral lesions. Thyroid normal.  Nodes: No adenopathy. Skin: No spider angiomata. No jaundice. No palmar erythema. Respiratory: Lungs clear to auscultation. Cardiovascular: Regular heart rate. No murmurs. No JVD. Abdomen: Soft non-tender. Liver size normal to percussion/palpation. Spleen not palpable. No obvious ascites. Extremities: No edema. No muscle wasting. No gross arthritic changes. Neurologic: Alert and oriented. Cranial nerves grossly intact. No asterixsis. LABORATORY STUDIES:  Liver [place] of [address] [place] [address] Units 10/3/2017 6/28/2017   WBC 3.4 - 10.8 x10E3/uL 4.9 7.0   ANC 1.4 - 7.0 x10E3/uL 2.6 4.3   HGB 11.1 - 15.9 g/dL 12.9 12.1    - 379 x10E3/uL 247 253   AST 0 - 40 IU/L 17 16   ALT 0 - 32 IU/L 12 12   Alk Phos 39 - 117 IU/L 84 85   Bili, Total 0.0 - 1.2 mg/dL 0.4 0.2   Bili, Direct 0.00 - 0.40 mg/dL 0.11 0.08   Albumin 3.5 - 4.8 g/dL 4.4 4.3   BUN 8 - 27 mg/dL 12 17   Creat 0.57 - 1.00 mg/dL 0.81 0.77   Na 134 - 144 mmol/L 143 139   K 3.5 - 5.2 mmol/L 5.0 4.5   Cl 96 - 106 mmol/L 102 100   CO2 18 - 29 mmol/L 27 25   Glucose 65 - 99 mg/dL 89 91     SEROLOGIES:  Serologies Latest Ref Rng & Units 10/3/2017 10/3/2017 6/28/2017   LEONILA, IFA  Positive (A) WILL FOLLOW Negative   LEONILA Pattern   1:160 (H)    ASMCA 0 - 19 Units 27 (H) WILL FOLLOW 31 (H)     LIVER HISTOLOGY:  6/2012. Liver biopsy at [facility]. Severe hepatitis with cholestasis consistent with AIH.    1/2017. Fiboscan at [facility].   Consistent with F0.    ENDOSCOPIC PROCEDURES:  Not available or performed    RADIOLOGY:  2/2012. Ultrasound of liver. Echogenic consistent with chronic liver disease. No liver mass lesions. No dilated bile ducts. No ascites. 5/2016. Ultrasound of liver. Echogenic consistent with chronic liver disease. No liver mass lesions. No dilated bile ducts. No ascites. OTHER TESTING:  Not available or performed    ASSESSMENT AND PLAN:  Autoimmune Hepatitis by liver biopsy in 2012. Liver transaminases are now normal.  Alkaline phosphate is normal.  Liver function is normal.  The platelet count is normal.      Based upon laboratory studies, Fibroscan and imaging the patient may have none or minimal fibrosis. The patient is receiving treatment with Cellcept. The patient is responding to and is tolerating the treatment without significant side effects. Will perform laboratory testing to monitor liver function and degree of liver injury. This will include BMP, hepatic panel, CBC with platelet count. There is no reason to perform liver biopsy at this time. The need to assess liver fibrosis was discussed. Sheer wave elastography can assess liver fibrosis and determine if a patient has advanced fibrosis or cirrhosis without the need for liver biopsy. Sheer wave elastography is now available at [facility]. This will be scheduled. Elastography can be repeated annually to assess for fibrosis progression or regression    The patient was directed to continue all current medications at the current dosages. There are no contraindications for the patient to take any medications that are necessary for treatment of other medical issues. The patient was counseled regarding alcohol consumption. The need for vaccination against viral hepatitis A and B will be assessed with serologic and instituted as appropriate. All of the above issues were discussed with the patient. All questions were answered.   The patient expressed a clear understanding of the above. [facility] 87 in 6 months to review all data and determine the treatment plan.      ABRAHAM Quinones 13 [address], [address]  98 [facility], [address]  [phone]

## 2018-04-23 LAB
ALBUMIN SERPL-MCNC: 4.1 G/DL (ref 3.5–4.8)
ALP SERPL-CCNC: 82 IU/L (ref 39–117)
ALT SERPL-CCNC: 8 IU/L (ref 0–32)
AST SERPL-CCNC: 16 IU/L (ref 0–40)
BASOPHILS # BLD AUTO: 0 X10E3/UL (ref 0–0.2)
BASOPHILS NFR BLD AUTO: 0 %
BILIRUB DIRECT SERPL-MCNC: 0.09 MG/DL (ref 0–0.4)
BILIRUB SERPL-MCNC: 0.3 MG/DL (ref 0–1.2)
BUN SERPL-MCNC: 14 MG/DL (ref 8–27)
BUN/CREAT SERPL: 19 (ref 12–28)
CALCIUM SERPL-MCNC: 9.9 MG/DL (ref 8.7–10.3)
CHLORIDE SERPL-SCNC: 97 MMOL/L (ref 96–106)
CO2 SERPL-SCNC: 27 MMOL/L (ref 18–29)
CREAT SERPL-MCNC: 0.74 MG/DL (ref 0.57–1)
EOSINOPHIL # BLD AUTO: 0.1 X10E3/UL (ref 0–0.4)
EOSINOPHIL NFR BLD AUTO: 2 %
ERYTHROCYTE [DISTWIDTH] IN BLOOD BY AUTOMATED COUNT: 14.7 % (ref 12.3–15.4)
GFR SERPLBLD CREATININE-BSD FMLA CKD-EPI: 82 ML/MIN/1.73
GFR SERPLBLD CREATININE-BSD FMLA CKD-EPI: 94 ML/MIN/1.73
GLUCOSE SERPL-MCNC: 97 MG/DL (ref 65–99)
HCT VFR BLD AUTO: 38.1 % (ref 34–46.6)
HGB BLD-MCNC: 12 G/DL (ref 11.1–15.9)
IMM GRANULOCYTES # BLD: 0 X10E3/UL (ref 0–0.1)
IMM GRANULOCYTES NFR BLD: 0 %
LYMPHOCYTES # BLD AUTO: 2.3 X10E3/UL (ref 0.7–3.1)
LYMPHOCYTES NFR BLD AUTO: 38 %
MCH RBC QN AUTO: 26.7 PG (ref 26.6–33)
MCHC RBC AUTO-ENTMCNC: 31.5 G/DL (ref 31.5–35.7)
MCV RBC AUTO: 85 FL (ref 79–97)
MONOCYTES # BLD AUTO: 0.6 X10E3/UL (ref 0.1–0.9)
MONOCYTES NFR BLD AUTO: 10 %
NEUTROPHILS # BLD AUTO: 3.1 X10E3/UL (ref 1.4–7)
NEUTROPHILS NFR BLD AUTO: 50 %
PLATELET # BLD AUTO: 254 X10E3/UL (ref 150–379)
POTASSIUM SERPL-SCNC: 4.7 MMOL/L (ref 3.5–5.2)
PROT SERPL-MCNC: 7.7 G/DL (ref 6–8.5)
RBC # BLD AUTO: 4.49 X10E6/UL (ref 3.77–5.28)
SODIUM SERPL-SCNC: 136 MMOL/L (ref 134–144)
WBC # BLD AUTO: 6.2 X10E3/UL (ref 3.4–10.8)

## 2018-04-30 NOTE — PROGRESS NOTES
All lab work WNL. Patient may reduce Cellcept to 250mg po BID. Will need to repeat lab work 2 months after decreased dose. Follow up as scheduled.

## 2018-05-01 DIAGNOSIS — K75.4 AUTOIMMUNE HEPATITIS (HCC): Primary | ICD-10-CM

## 2018-05-11 ENCOUNTER — HOSPITAL ENCOUNTER (OUTPATIENT)
Dept: ULTRASOUND IMAGING | Age: 73
Discharge: HOME | End: 2018-05-11
Attending: NURSE PRACTITIONER
Payer: MEDICARE

## 2018-05-11 DIAGNOSIS — K75.4 AUTOIMMUNE HEPATITIS (HCC): ICD-10-CM

## 2018-05-11 PROCEDURE — 0346T US ABD COMP W ELASTOGRAPHY: CPT

## 2018-05-17 NOTE — PROGRESS NOTES
Elastography suggests normal fibrosis. No masses. Left kidney has simple appearing cyst. Follow up as scheduled.

## 2018-06-07 RX ORDER — MYCOPHENOLATE MOFETIL 250 MG/1
250 CAPSULE ORAL 2 TIMES DAILY
Qty: 180 CAP | Refills: 3 | Status: SHIPPED | OUTPATIENT
Start: 2018-06-07 | End: 2019-06-29 | Stop reason: SDUPTHER

## 2018-07-09 ENCOUNTER — HOSPITAL ENCOUNTER (OUTPATIENT)
Dept: LAB | Age: 73
Discharge: HOME | End: 2018-07-09
Payer: MEDICARE

## 2018-07-09 PROCEDURE — 99001 SPECIMEN HANDLING PT-LAB: CPT | Performed by: NURSE PRACTITIONER

## 2018-07-10 ENCOUNTER — OFFICE VISIT (OUTPATIENT)
Dept: HEMATOLOGY | Age: 73
End: 2018-07-10

## 2018-07-10 VITALS
SYSTOLIC BLOOD PRESSURE: 143 MMHG | HEIGHT: 67 IN | TEMPERATURE: 98.3 F | HEART RATE: 82 BPM | DIASTOLIC BLOOD PRESSURE: 83 MMHG | OXYGEN SATURATION: 97 % | WEIGHT: 163.25 LBS | BODY MASS INDEX: 25.62 KG/M2

## 2018-07-10 DIAGNOSIS — K75.4 AUTOIMMUNE HEPATITIS (HCC): Primary | ICD-10-CM

## 2018-07-10 LAB
ALBUMIN SERPL-MCNC: 4.3 G/DL (ref 3.5–4.8)
ALP SERPL-CCNC: 94 IU/L (ref 39–117)
ALT SERPL-CCNC: 26 IU/L (ref 0–32)
AST SERPL-CCNC: 29 IU/L (ref 0–40)
BASOPHILS # BLD AUTO: 0 X10E3/UL (ref 0–0.2)
BASOPHILS NFR BLD AUTO: 0 %
BILIRUB DIRECT SERPL-MCNC: 0.09 MG/DL (ref 0–0.4)
BILIRUB SERPL-MCNC: 0.4 MG/DL (ref 0–1.2)
BUN SERPL-MCNC: 12 MG/DL (ref 8–27)
BUN/CREAT SERPL: 15 (ref 12–28)
CALCIUM SERPL-MCNC: 10.4 MG/DL (ref 8.7–10.3)
CHLORIDE SERPL-SCNC: 101 MMOL/L (ref 96–106)
CO2 SERPL-SCNC: 27 MMOL/L (ref 20–29)
CREAT SERPL-MCNC: 0.82 MG/DL (ref 0.57–1)
EOSINOPHIL # BLD AUTO: 0.1 X10E3/UL (ref 0–0.4)
EOSINOPHIL NFR BLD AUTO: 3 %
ERYTHROCYTE [DISTWIDTH] IN BLOOD BY AUTOMATED COUNT: 14.4 % (ref 12.3–15.4)
GLUCOSE SERPL-MCNC: 94 MG/DL (ref 65–99)
HCT VFR BLD AUTO: 40.9 % (ref 34–46.6)
HGB BLD-MCNC: 12.8 G/DL (ref 11.1–15.9)
IMM GRANULOCYTES # BLD: 0 X10E3/UL (ref 0–0.1)
IMM GRANULOCYTES NFR BLD: 0 %
LYMPHOCYTES # BLD AUTO: 1.9 X10E3/UL (ref 0.7–3.1)
LYMPHOCYTES NFR BLD AUTO: 37 %
MCH RBC QN AUTO: 26.9 PG (ref 26.6–33)
MCHC RBC AUTO-ENTMCNC: 31.3 G/DL (ref 31.5–35.7)
MCV RBC AUTO: 86 FL (ref 79–97)
MONOCYTES # BLD AUTO: 0.4 X10E3/UL (ref 0.1–0.9)
MONOCYTES NFR BLD AUTO: 8 %
NEUTROPHILS # BLD AUTO: 2.7 X10E3/UL (ref 1.4–7)
NEUTROPHILS NFR BLD AUTO: 52 %
PLATELET # BLD AUTO: 252 X10E3/UL (ref 150–379)
POTASSIUM SERPL-SCNC: 4.6 MMOL/L (ref 3.5–5.2)
PROT SERPL-MCNC: 7.5 G/DL (ref 6–8.5)
RBC # BLD AUTO: 4.76 X10E6/UL (ref 3.77–5.28)
SODIUM SERPL-SCNC: 141 MMOL/L (ref 134–144)
WBC # BLD AUTO: 5.3 X10E3/UL (ref 3.4–10.8)

## 2018-07-10 NOTE — MR AVS SNAPSHOT
[address] [address] [address] [address] 
[phone] Patient: Gilmar Sal MRN: [id number] ATE:8/85/9744 Visit Information Date & Time Provider Department Dept. Phone Encounter #  
 7/10/2018 11:30 AM Wale Pierce NP [address] [id number] Follow-up Instructions Return in about 4 months (around 11/10/2018) for [facility]. Your Appointments 10/18/2018 12:45 PM  
[place] [address] [facility] ([facility]) Appt Note: 6 month f/u  
 [facility], [address] [facility] [address]  
  
   
 [facility], [address] [place] [address] Upcoming Health Maintenance Date Due Hepatitis C Screening 1945 DTaP/Tdap/Td series (1 - Tdap) 9/30/1967 FOBT Q 1 YEAR AGE 50-75 9/30/1996 ZOSTER VACCINE AGE 60> 7/30/2006 GLAUCOMA SCREENING Q2Y 9/30/2011 Bone Densitometry (Dexa) Screening 9/30/2011 Pneumococcal 65+ Low/Medium Risk (1 of 2 - PCV13) 9/30/2011 MEDICARE YEARLY EXAM 3/14/2018 Influenza Age 5 to Adult 8/1/2018 BREAST CANCER SCRN MAMMOGRAM 11/30/2019 Allergies as of 7/10/2018  Review Complete On: 7/10/2018 By: José Miguel Alberto LPN No Known Allergies Current Immunizations  Never Reviewed No immunizations on file. Not reviewed this visit Vitals BP Pulse Temp Height(growth percentile) Weight(growth percentile) SpO2  
 143/83 82 98.3 °F (36.8 °C) (Tympanic) 5' 7\" (1.702 m) 163 lb 4 oz (74 kg) 97% BMI OB Status Smoking Status 25.57 kg/m2 Menopause Former Smoker BMI and BSA Data Body Mass Index Body Surface Area 25.57 kg/m 2 1.87 m 2 Preferred Pharmacy Pharmacy Name Phone [id number]. Collin Roman, [address] [facility]. [phone] Your Updated Medication List  
  
   
 This list is accurate as of 7/10/18 12:13 PM.  Always use your most recent med list.  
  
  
  
  
 azithromycin 250 mg tablet Commonly known as:  Con Chant  
take 2 tablets by mouth today then take 1 tablet DAILY FOR 4 DAYS  
  
 CALCIUM 600 + D 600-125 mg-unit Tab Generic drug:  calcium-cholecalciferol (d3) Take  by mouth. diclofenac 1 % Gel Commonly known as:  VOLTAREN Apply 4 g to affected area two (2) times a day. Apply to affected area as directed. hydroCHLOROthiazide 12.5 mg tablet Commonly known as:  HYDRODIURIL Take 12.5 mg by mouth daily. mycophenolate mofetil 250 mg capsule Commonly known as:  CELLCEPT Take 1 Cap by mouth two (2) times a day. Follow-up Instructions Return in about 4 months (around 11/10/2018) for [place]. Introducing \[facility] [place] [facility]\"" & HEALTH SERVICES! [facility] introduces RestoMesto patient portal. Now you can access parts of your medical record, email your doctor's office, and request medication refills online. 1. In your internet browser, go to [email or web address] 2. Click on the First Time User? Click Here link in the Sign In box. You will see the New Member Sign Up page. 3. Enter your RestoMesto Access Code exactly as it appears below. You will not need to use this code after youve completed the sign-up process. If you do not sign up before the expiration date, you must request a new code. · RestoMesto Access Code: [email or web address] Expires: 7/18/2018  4:37 PM 
 
4. Enter the last four digits of your Social Security Number (xxxx) and Date of Birth (mm/dd/yyyy) as indicated and click Submit. You will be taken to the next sign-up page. 5. Create a [employer] ID. This will be your RestoMesto login ID and cannot be changed, so think of one that is secure and easy to remember. 6. Create a [employer] password. You can change your password at any time. 7. Enter your Password Reset Question and Answer.  This can be used at a later time if you forget your password. 8. Enter your e-mail address. You will receive e-mail notification when new information is available in [address] 9. Click Sign Up. You can now view and download portions of your medical record. 10. Click the Download Summary menu link to download a portable copy of your medical information. If you have questions, please visit the Frequently Asked Questions section of the Mibio website. Remember, Mibio is NOT to be used for urgent needs. For medical emergencies, dial 911. Now available from your iPhone and Android! Please provide this summary of care documentation to your next provider. Your primary care clinician is listed as Blanca Rojas. If you have any questions after today's visit, please call [phone].

## 2018-11-14 ENCOUNTER — HOSPITAL ENCOUNTER (OUTPATIENT)
Dept: LAB | Age: 73
Discharge: HOME | End: 2018-11-14

## 2018-11-14 DIAGNOSIS — K75.4 AUTOIMMUNE HEPATITIS (HCC): Primary | ICD-10-CM

## 2018-11-14 LAB — XX-LABCORP SPECIMEN COL,LCBCF: NORMAL

## 2018-11-14 PROCEDURE — 99001 SPECIMEN HANDLING PT-LAB: CPT

## 2018-11-15 ENCOUNTER — OFFICE VISIT (OUTPATIENT)
Dept: HEMATOLOGY | Age: 73
End: 2018-11-15

## 2018-11-15 VITALS
SYSTOLIC BLOOD PRESSURE: 132 MMHG | BODY MASS INDEX: 25.18 KG/M2 | WEIGHT: 160.4 LBS | HEIGHT: 67 IN | OXYGEN SATURATION: 98 % | HEART RATE: 67 BPM | TEMPERATURE: 98 F | DIASTOLIC BLOOD PRESSURE: 75 MMHG | RESPIRATION RATE: 16 BRPM

## 2018-11-15 DIAGNOSIS — K75.4 AUTOIMMUNE HEPATITIS (HCC): Primary | ICD-10-CM

## 2018-11-15 LAB
ALBUMIN SERPL-MCNC: 4.2 G/DL (ref 3.5–4.8)
ALP SERPL-CCNC: 88 IU/L (ref 39–117)
ALT SERPL-CCNC: 15 IU/L (ref 0–32)
AST SERPL-CCNC: 20 IU/L (ref 0–40)
BASOPHILS # BLD AUTO: 0 X10E3/UL (ref 0–0.2)
BASOPHILS NFR BLD AUTO: 1 %
BILIRUB DIRECT SERPL-MCNC: 0.13 MG/DL (ref 0–0.4)
BILIRUB SERPL-MCNC: 0.5 MG/DL (ref 0–1.2)
BUN SERPL-MCNC: 12 MG/DL (ref 8–27)
BUN/CREAT SERPL: 16 (ref 12–28)
CALCIUM SERPL-MCNC: 10 MG/DL (ref 8.7–10.3)
CHLORIDE SERPL-SCNC: 102 MMOL/L (ref 96–106)
CO2 SERPL-SCNC: 26 MMOL/L (ref 20–29)
CREAT SERPL-MCNC: 0.75 MG/DL (ref 0.57–1)
EOSINOPHIL # BLD AUTO: 0.1 X10E3/UL (ref 0–0.4)
EOSINOPHIL NFR BLD AUTO: 2 %
ERYTHROCYTE [DISTWIDTH] IN BLOOD BY AUTOMATED COUNT: 14.4 % (ref 12.3–15.4)
GLUCOSE SERPL-MCNC: 86 MG/DL (ref 65–99)
HCT VFR BLD AUTO: 40.7 % (ref 34–46.6)
HGB BLD-MCNC: 12.8 G/DL (ref 11.1–15.9)
IMM GRANULOCYTES # BLD: 0 X10E3/UL (ref 0–0.1)
IMM GRANULOCYTES NFR BLD: 0 %
LYMPHOCYTES # BLD AUTO: 1.8 X10E3/UL (ref 0.7–3.1)
LYMPHOCYTES NFR BLD AUTO: 32 %
MCH RBC QN AUTO: 27.1 PG (ref 26.6–33)
MCHC RBC AUTO-ENTMCNC: 31.4 G/DL (ref 31.5–35.7)
MCV RBC AUTO: 86 FL (ref 79–97)
MONOCYTES # BLD AUTO: 0.4 X10E3/UL (ref 0.1–0.9)
MONOCYTES NFR BLD AUTO: 8 %
NEUTROPHILS # BLD AUTO: 3.1 X10E3/UL (ref 1.4–7)
NEUTROPHILS NFR BLD AUTO: 57 %
PLATELET # BLD AUTO: 256 X10E3/UL (ref 150–379)
POTASSIUM SERPL-SCNC: 4.4 MMOL/L (ref 3.5–5.2)
PROT SERPL-MCNC: 7.8 G/DL (ref 6–8.5)
RBC # BLD AUTO: 4.73 X10E6/UL (ref 3.77–5.28)
SODIUM SERPL-SCNC: 142 MMOL/L (ref 134–144)
WBC # BLD AUTO: 5.4 X10E3/UL (ref 3.4–10.8)

## 2018-11-15 NOTE — PROGRESS NOTES
[address] [facility] [address], [place], [address], [place], [place]       ABRAHAM Naqvi PA-C [place] Book, [facility]   ABRAHAM Epps NP [address] [facility] [address]    at [facility]    [facility], [address] [place] [address], [place]  [address].    [phone]    FAX: [address] [place] [address] Avenue    at [address], [address], [address]    [phone]    FAX: [phone]       Patient Care Team:  Kaden Schaeffer MD as PCP - General (Internal Medicine)      Problem List  Date Reviewed: 11/15/2018          Codes Class Noted    Autoimmune hepatitis [facility]) ICD-10-CM: K75.4  ICD-9-CM: 571.42  6/28/2017        Hypertension ICD-10-CM: I10  ICD-9-CM: 401.9  6/28/2017              Annabel Triplett returns to the [facility] for management of autoimmune hepatitis. The active problem list, all pertinent past medical history, medications, liver histology, radiologic findings, and laboratory findings related to the liver disorder were reviewed with the patient. The patient is a 68 y.o. Black female who was first noted to have abnormalities in liver transaminases in 2012 and then developed more profound elevation in liver enzymes and jaundice while she was visiting family in [facility] 1946. She was hospitalized and transferred to [facility]. A liver biopsy was performed. The findings were consistent with severe AIH and she was treated with high dose prednisone and cellcept. Prednisone was eventually tapered off. She remains on low dose cellcept. The most recent imaging done was ultrasound 5/2018. The results demonstrated nonspecific mild increased hepatic echotexture. Assessment of liver fibrosis was performed with sheer wave elastography at [facility] in 5/2018.   The result was 3.89 kPa which correlates with no fibrosis. The most recent laboratory studies indicate that the liver transaminases are normal, ALP is normal, tests of hepatic synthetic and metabolic function are normal, the platelet count is normal.     The patient has no symptoms which could be attributed to the liver disorder. The patient has not experienced fatigue, pain in the right side over the liver. The patient completes all daily activities without any functional limitations. ALLERGIES  No Known Allergies    MEDICATIONS  Current Outpatient Medications   Medication Sig    mycophenolate mofetil (CELLCEPT) 250 mg capsule Take 1 Cap by mouth two (2) times a day.  calcium-cholecalciferol, d3, (CALCIUM 600 + D) 600-125 mg-unit tab Take  by mouth.  diclofenac (VOLTAREN) 1 % gel Apply 4 g to affected area two (2) times a day. Apply to affected area as directed.  hydroCHLOROthiazide (HYDRODIURIL) 12.5 mg tablet Take 12.5 mg by mouth daily. No current facility-administered medications for this visit. SYSTEM REVIEW NOT RELATED TO LIVER DISEASE OR REVIEWED ABOVE:  Constitution systems: Negative for fever, chills, weight gain, weight loss. Eyes: Negative for visual changes. ENT: Negative for sore throat, painful swallowing. Respiratory: Negative for cough, hemoptysis, SOB. Cardiology: Negative for chest pain, palpitations. GI:  Negative for constipation or diarrhea. : Negative for urinary frequency, dysuria, hematuria, nocturia. Skin: Negative for rash. Hematology: Negative for easy bruising, blood clots. Musculo-skelatal: Negative for back pain, muscle pain, weakness. Neurologic: Negative for headaches, dizziness, vertigo, memory problems not related to HE. Psychology: Negative for anxiety, depression. FAMILY HISTORY:  The father  of COPD. The mother  of breast cancer. There is no family history of liver disease.     There is no family history of immune disorders. SOCIAL HISTORY:  The patient is . The patient has 1 child. The patient has never used tobacco products. The patient has never consumed significant amounts of alcohol. The patient used to work as a . The patient retired in 2014. PHYSICAL EXAMINATION:  Visit Vitals  /75 (BP 1 Location: Left arm, BP Patient Position: Sitting)   Pulse 67   Temp 98 °F (36.7 °C) (Tympanic)   Resp 16   Ht 5' 7\" (1.702 m)   Wt 160 lb 6.4 oz (72.8 kg)   SpO2 98%   BMI 25.12 kg/m²     General: No acute distress. Eyes: Sclera anicteric. ENT: No oral lesions. Thyroid normal.  Nodes: No adenopathy. Skin: No spider angiomata. No jaundice. No palmar erythema. Respiratory: Lungs clear to auscultation. Cardiovascular: Regular heart rate. No murmurs. No JVD. Abdomen: Soft non-tender. Liver size normal to percussion/palpation. Spleen not palpable. No obvious ascites. Extremities: No edema. No muscle wasting. No gross arthritic changes. Neurologic: Alert and oriented. Cranial nerves grossly intact. No asterixsis. LABORATORY STUDIES:  Liver [place] [address] Units 11/14/2018 7/9/2018   WBC 3.4 - 10.8 x10E3/uL 5.4 5.3   ANC 1.4 - 7.0 x10E3/uL 3.1 2.7   HGB 11.1 - 15.9 g/dL 12.8 12.8    - 379 x10E3/uL 256 252   AST 0 - 40 IU/L 20 29   ALT 0 - 32 IU/L 15 26   Alk Phos 39 - 117 IU/L 88 94   Bili, Total 0.0 - 1.2 mg/dL 0.5 0.4   Bili, Direct  WILL FOLLOW 0.09   Albumin 3.5 - 4.8 g/dL 4.2 4.3   BUN 8 - 27 mg/dL 12 12   Creat 0.57 - 1.00 mg/dL 0.75 0.82   Na 134 - 144 mmol/L 142 141   K 3.5 - 5.2 mmol/L 4.4 4.6   Cl 96 - 106 mmol/L 102 101   CO2 20 - 29 mmol/L 26 27   Glucose 65 - 99 mg/dL 86 94     SEROLOGIES:  Serologies Latest Ref Rng & Units 10/3/2017 10/3/2017 6/28/2017   LEONILA, IFA  Positive (A) WILL FOLLOW Negative   LEONILA Pattern   1:160 (H)    ASMCA 0 - 19 Units 27 (H) WILL FOLLOW 31 (H)     LIVER HISTOLOGY:  6/2012. Liver biopsy at [facility]. Severe hepatitis with cholestasis consistent with AIH.    1/2017. Fiboscan at [facility]. Consistent with F0.  5/2018. Sheer wave elastography performed at THE [facility]. EkPa was E Range: 3.26-4.76 kPa, E Mean: 3.89 kPa, E Median: 3.85 kPa, E Std: 0.44 kPa. The results suggested a fibrosis level of F0. ENDOSCOPIC PROCEDURES:  Not available or performed    RADIOLOGY:  2/2012. Ultrasound of liver. Echogenic consistent with chronic liver disease. No liver mass lesions. No dilated bile ducts. No ascites. 5/2016. Ultrasound of liver. Echogenic consistent with chronic liver disease. No liver mass lesions. No dilated bile ducts. No ascites. 5/2018. Ultrasound of liver. Echogenic consistent with chronic liver disease. No liver mass lesions. No dilated bile ducts. No ascites. OTHER TESTING:  Not available or performed    ASSESSMENT AND PLAN:  Autoimmune Hepatitis by liver biopsy in 2012. Liver transaminases are now normal.  Alkaline phosphate is normal.  Liver function is normal.  The platelet count is normal.      Based upon laboratory studies and imaging the patient does not appear to have significant liver injury. The patient is receiving treatment with Cellcept. The patient is responding to and is tolerating the treatment without significant side effects. Will perform laboratory testing to monitor liver function and degree of liver injury. This will include BMP, hepatic panel, CBC with platelet count. There is no reason to perform liver biopsy at this time. The need to assess liver fibrosis was discussed. Sheer wave elastography can assess liver fibrosis and determine if a patient has advanced fibrosis or cirrhosis without the need for liver biopsy. Sheer wave elastography is now available at [facility]. Elastography can be repeated annually to demonstrate that the treatment is resolving hepatic fibrosis. The patient was directed to continue all current medications at the current dosages. There are no contraindications for the patient to take any medications that are necessary for treatment of other medical issues. The patient was counseled regarding alcohol consumption. The need for vaccination against viral hepatitis A and B will be assessed with serologic and instituted as appropriate. All of the above issues were discussed with the patient. All questions were answered. The patient expressed a clear understanding of the above. [address] [facility] 87 in 6 months to monitor.       Latoya Morales AGPCNP-BC  [address] [place] [address], [address] [place] [address] Street [address]  [phone]

## 2018-11-15 NOTE — LETTER
11/15/2018 11:32 AM 
 
RE:    Annabel Triplett [address] [facility] [address] Thank you for agreeing to see Lorrie Riddle I am referring my patient to you for evaluation of ***. Please see her 
pertinent patient information below. {HISTORY MED SURG [facility] [place] [facility] [id number]} I appreciate your assistance in Ms. Jacky Hilliard care  and look forward to your findings and recommendations.  
 
 
 
Sincerely, 
 
 
Chris Cisneros NP

## 2018-12-06 ENCOUNTER — HOSPITAL ENCOUNTER (OUTPATIENT)
Dept: MAMMOGRAPHY | Age: 73
Discharge: HOME | End: 2018-12-06
Payer: MEDICARE

## 2018-12-06 DIAGNOSIS — Z12.31 VISIT FOR SCREENING MAMMOGRAM: ICD-10-CM

## 2018-12-06 PROCEDURE — 77067 SCR MAMMO BI INCL CAD: CPT

## 2018-12-19 ENCOUNTER — HOSPITAL ENCOUNTER (OUTPATIENT)
Dept: ULTRASOUND IMAGING | Age: 73
Discharge: HOME | End: 2018-12-19
Attending: NURSE PRACTITIONER
Payer: MEDICARE

## 2018-12-19 DIAGNOSIS — K75.4 AUTOIMMUNE HEPATITIS (HCC): ICD-10-CM

## 2018-12-19 PROCEDURE — 0346T US ABD COMP W ELASTOGRAPHY: CPT

## 2018-12-20 NOTE — PROGRESS NOTES
Please call patient and let them know that their recent ultrasound is stable with no concerning lesions/masses within the liver. Elastography correlates with no fibrosis. I will see them at their next follow up appointment.

## 2019-05-16 ENCOUNTER — APPOINTMENT (OUTPATIENT)
Dept: GENERAL RADIOLOGY | Age: 74
End: 2019-05-16
Attending: PHYSICIAN ASSISTANT
Payer: MEDICARE

## 2019-05-16 ENCOUNTER — HOSPITAL ENCOUNTER (EMERGENCY)
Age: 74
Discharge: HOME | End: 2019-05-16
Attending: EMERGENCY MEDICINE
Payer: MEDICARE

## 2019-05-16 ENCOUNTER — APPOINTMENT (OUTPATIENT)
Dept: CT IMAGING | Age: 74
End: 2019-05-16
Attending: PHYSICIAN ASSISTANT
Payer: MEDICARE

## 2019-05-16 ENCOUNTER — OFFICE VISIT (OUTPATIENT)
Dept: ORTHOPEDIC SURGERY | Facility: CLINIC | Age: 74
End: 2019-05-16

## 2019-05-16 VITALS
DIASTOLIC BLOOD PRESSURE: 73 MMHG | HEART RATE: 61 BPM | RESPIRATION RATE: 18 BRPM | TEMPERATURE: 97.1 F | SYSTOLIC BLOOD PRESSURE: 167 MMHG | OXYGEN SATURATION: 100 %

## 2019-05-16 VITALS
HEART RATE: 80 BPM | TEMPERATURE: 96.6 F | RESPIRATION RATE: 18 BRPM | HEIGHT: 67 IN | DIASTOLIC BLOOD PRESSURE: 83 MMHG | BODY MASS INDEX: 25.08 KG/M2 | WEIGHT: 159.8 LBS | SYSTOLIC BLOOD PRESSURE: 141 MMHG | OXYGEN SATURATION: 99 %

## 2019-05-16 DIAGNOSIS — S70.02XA CONTUSION OF LEFT HIP, INITIAL ENCOUNTER: ICD-10-CM

## 2019-05-16 DIAGNOSIS — S46.212A BICEPS RUPTURE, PROXIMAL, LEFT, INITIAL ENCOUNTER: ICD-10-CM

## 2019-05-16 DIAGNOSIS — R58 ECCHYMOSIS ON EXAMINATION: ICD-10-CM

## 2019-05-16 DIAGNOSIS — M25.612 DECREASED RANGE OF MOTION OF SHOULDER, LEFT: ICD-10-CM

## 2019-05-16 DIAGNOSIS — S09.90XA INJURY OF HEAD, INITIAL ENCOUNTER: ICD-10-CM
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
## 2019-05-16 DIAGNOSIS — M25.512 ACUTE PAIN OF LEFT SHOULDER: Primary | ICD-10-CM

## 2019-05-16 DIAGNOSIS — R91.1 LUNG NODULE: ICD-10-CM

## 2019-05-16 DIAGNOSIS — S40.012A CONTUSION OF LEFT SHOULDER, INITIAL ENCOUNTER: Primary | ICD-10-CM

## 2019-05-16 DIAGNOSIS — S40.022A CONTUSION OF MULTIPLE SITES OF LEFT UPPER EXTREMITY, INITIAL ENCOUNTER: ICD-10-CM

## 2019-05-16 DIAGNOSIS — M75.82 TENDONITIS OF LEFT ROTATOR CUFF: ICD-10-CM

## 2019-05-16 PROCEDURE — 70450 CT HEAD/BRAIN W/O DYE: CPT

## 2019-05-16 PROCEDURE — 96374 THER/PROPH/DIAG INJ IV PUSH: CPT

## 2019-05-16 PROCEDURE — A4565 SLINGS: HCPCS

## 2019-05-16 PROCEDURE — 96375 TX/PRO/DX INJ NEW DRUG ADDON: CPT

## 2019-05-16 PROCEDURE — 71046 X-RAY EXAM CHEST 2 VIEWS: CPT

## 2019-05-16 PROCEDURE — 73502 X-RAY EXAM HIP UNI 2-3 VIEWS: CPT

## 2019-05-16 PROCEDURE — 74011250636 HC RX REV CODE- 250/636: Performed by: PHYSICIAN ASSISTANT

## 2019-05-16 PROCEDURE — 72125 CT NECK SPINE W/O DYE: CPT

## 2019-05-16 PROCEDURE — 99284 EMERGENCY DEPT VISIT MOD MDM: CPT

## 2019-05-16 PROCEDURE — 96361 HYDRATE IV INFUSION ADD-ON: CPT

## 2019-05-16 PROCEDURE — 73030 X-RAY EXAM OF SHOULDER: CPT

## 2019-05-16 RX ORDER — TRAMADOL HYDROCHLORIDE 50 MG/1
50 TABLET ORAL
Qty: 8 TAB | Refills: 0 | Status: SHIPPED | OUTPATIENT
Start: 2019-05-16 | End: 2019-05-16

## 2019-05-16 RX ORDER — HYDROCODONE BITARTRATE AND ACETAMINOPHEN 5; 325 MG/1; MG/1
1 TABLET ORAL
Qty: 12 TAB | Refills: 0 | Status: SHIPPED | OUTPATIENT
Start: 2019-05-16 | End: 2019-05-19

## 2019-05-16 RX ORDER — ONDANSETRON 2 MG/ML
4 INJECTION INTRAMUSCULAR; INTRAVENOUS
Status: COMPLETED | OUTPATIENT
Start: 2019-05-16 | End: 2019-05-16

## 2019-05-16 RX ORDER — ONDANSETRON 4 MG/1
4 TABLET, ORALLY DISINTEGRATING ORAL
Status: DISCONTINUED | OUTPATIENT
Start: 2019-05-16 | End: 2019-05-16

## 2019-05-16 RX ORDER — MORPHINE SULFATE 2 MG/ML
2 INJECTION, SOLUTION INTRAMUSCULAR; INTRAVENOUS
Status: DISCONTINUED | OUTPATIENT
Start: 2019-05-16 | End: 2019-05-16

## 2019-05-16 RX ORDER — NAPROXEN 500 MG/1
500 TABLET ORAL 2 TIMES DAILY WITH MEALS
Qty: 20 TAB | Refills: 0 | Status: SHIPPED | OUTPATIENT
Start: 2019-05-16 | End: 2019-05-26

## 2019-05-16 RX ORDER — MORPHINE SULFATE 2 MG/ML
4 INJECTION, SOLUTION INTRAMUSCULAR; INTRAVENOUS
Status: COMPLETED | OUTPATIENT
Start: 2019-05-16 | End: 2019-05-16

## 2019-05-16 RX ADMIN — ONDANSETRON 4 MG: 2 INJECTION INTRAMUSCULAR; INTRAVENOUS at 10:41

## 2019-05-16 RX ADMIN — SODIUM CHLORIDE 1000 ML: 900 INJECTION, SOLUTION INTRAVENOUS at 10:53

## 2019-05-16 RX ADMIN — MORPHINE SULFATE 4 MG: 2 INJECTION, SOLUTION INTRAMUSCULAR; INTRAVENOUS at 10:46

## 2019-05-16 NOTE — DISCHARGE INSTRUCTIONS
Patient Education   Take medication as prescribed. Follow-up with the orthopedic physician for reassessment for the shoulder pain. Bring the results from this visit with you for their review. Follow-up with the primary care physician for further assessment for the lung nodule. Return to the ED immediately for any new, worsening, or persistent symptoms. Including vomiting, headache, or any other medical concerns. Learning About Lung Nodules  What is a lung nodule? A lung nodule is a growth in the lung. A single nodule surrounded by lung tissue is called a solitary pulmonary nodule. A lung nodule might not cause any symptoms. Your doctor may have found one or more nodules on your lung when you were having a chest X-ray or CT scan. Or it may have been found during a lung cancer screening. A lung nodule may be caused by an old infection or cancer. It might also be a noncancerous growth. Lung nodules can cause a screening to give an abnormal result. Most nodules do not cause any harm. But without further tests, your doctor can't tell whether an abnormal finding is cancer, a harmless nodule, or something else. What can you expect when you have a lung nodule? Your doctor will look at several risk factors to see how likely it is that the nodule is cancer. He or she will look at:  · Whether you smoke or have ever smoked. · Your age and your family's medical history. · Whether you have ever had lung cancer. · The size and shape of the nodule. · Whether the nodule has changed in size. Your doctor may look at past chest X-rays or CT scans, if available, and compare them. Or you may have a series of CT scans to see if the nodule grows over time. What happens next depends on the risk of the nodule being cancer. · If you have no risk factors and the nodule is small, your doctor may advise doing nothing. · If the risk is small, your doctor may schedule follow-up appointments and tests.  You may have more CT scans later to see if the nodule is growing. If the nodule hasn't changed in 3 to 6 months, you may have CT scans every year. If it hasn't changed in 2 years, you may not need any more tests. · If there's a higher risk of cancer, your doctor may:  ? Do a PET scan, which may help tell if the nodule is cancerous or not. ? Take a sample of tissue from the nodule for testing. This is called a biopsy. ? Remove the nodule with surgery. Follow-up care is a key part of your treatment and safety. Be sure to make and go to all appointments, and call your doctor if you are having problems. It's also a good idea to know your test results and keep a list of the medicines you take. Where can you learn more? Go to http://baljinder-tony.info/. Enter W980 in the search box to learn more about \"Learning About Lung Nodules. \"  Current as of: March 27, 2018  Content Version: 11.9  © 2587-1828 [employer]. Care instructions adapted under license by [employer] (which disclaims liability or warranty for this information). If you have questions about a medical condition or this instruction, always ask your healthcare professional. [address] [address] any warranty or liability for your use of this information. Patient Education        Learning About a Closed Head Injury  What is a closed head injury? A closed head injury happens when your head gets hit hard. The strong force of the blow causes your brain to shake in your skull. This movement can cause the brain to bruise, swell, or tear. Sometimes nerves or blood vessels also get damaged. This can cause bleeding in or around the brain. A concussion is a type of closed head injury. What are the symptoms? If you have a mild concussion, you may have a mild headache or feel \"not quite right. \" These symptoms are common. They usually go away over a few days to 4 weeks.   But sometimes after a concussion, you feel like you can't function as well as before the injury. And you have new symptoms. This is called postconcussive syndrome. You may:  · Find it harder to solve problems, think, concentrate, or remember. · Have headaches. · Have changes in your sleep patterns, such as not being able to sleep or sleeping all the time. · Have changes in your personality. · Not be interested in your usual activities. · Feel angry or anxious without a clear reason. · Lose your sense of taste or smell. · Be dizzy, lightheaded, or unsteady. It may be hard to stand or walk. How is a closed head injury treated? Any person who may have a concussion needs to see a doctor. Some people have to stay in the hospital to be watched. Others can go home safely. If you go home, follow your doctor's instructions. He or she will tell you if you need someone to watch you closely for the next 24 hours or longer. Rest is the best treatment. Get plenty of sleep at night. And try to rest during the day. · Avoid activities that are physically or mentally demanding. These include housework, exercise, and schoolwork. And don't play video games, send text messages, or use the computer. You may need to change your school or work schedule to be able to avoid these activities. · Ask your doctor when it's okay to drive, ride a bike, or operate machinery. · Take an over-the-counter pain medicine, such as acetaminophen (Tylenol), ibuprofen (Advil, Motrin), or naproxen (Aleve). Be safe with medicines. Read and follow all instructions on the label. · Check with your doctor before you use any other medicines for pain. · Do not drink alcohol or use illegal drugs. They can slow recovery. They can also increase your risk of getting a second head injury. Follow-up care is a key part of your treatment and safety. Be sure to make and go to all appointments, and call your doctor if you are having problems.  It's also a good idea to know your test results and keep a list of the medicines you take. Where can you learn more? Go to http://baljinder-tony.info/. Enter E235 in the search box to learn more about \"Learning About a Closed Head Injury. \"  Current as of: Althea 3, 2018  Content Version: 11.9  © 7720-0295 [employer]. Care instructions adapted under license by [employer] (which disclaims liability or warranty for this information). If you have questions about a medical condition or this instruction, always ask your healthcare professional. [address] any warranty or liability for your use of this information.

## 2019-05-16 NOTE — PROGRESS NOTES
Patient: Gill Griffin                MRN: [id number]       SSN: [id number] YOB: 1945        AGE: 68 y.o. SEX: female PCP: Walter Appiah MD 
05/16/19 Chief Complaint Patient presents with  Shoulder Pain Left  Hip Pain Left HISTORY:  Gill Griffin is a 68 y.o. female presents to the orthopedic office status post fall at home this morning. She was walking around her pool at about 930 this morning when she tripped on a raised area of concrete. She fell with her left arm outstretched on her left side. There was no loss of consciousness. She bruised her left hip in the fall. She has no history of pain or trauma to the left hip nor the left upper extremity. She is retired. Pain to the left upper arm and shoulder at rest 7 out of 10 and with attempted activity to include raising the arm forward into the side the pain increases to in excess of 10 on a 10 point scale. Pain at rest to the left hip 2-3 on a 10 point scale upper outer in the area associated and with activity to include walking and standing from a seated position and sitting from a standing position pain increases slightly to a 4-5 out of 10 point scale. Pain Assessment  5/16/2019 Location of Pain Hip Location Modifiers Left Severity of Pain 0 Quality of Pain - Duration of Pain - Frequency of Pain - Aggravating Factors - Limiting Behavior No  
Relieving Factors - Result of Injury - Work-Related Injury - Type of Injury - No results found for: HBA1C, HGBE8, TKI8HFPP, [id number] Weight Metrics 5/16/2019 11/15/2018 7/10/2018 4/19/2018 10/18/2017 6/28/2017 Weight 159 lb 12.8 oz 160 lb 6.4 oz 163 lb 4 oz 160 lb 157 lb 164 lb 12.8 oz  
BMI 25.03 kg/m2 25.12 kg/m2 25.57 kg/m2 25.06 kg/m2 24.59 kg/m2 25.81 kg/m2 Problem List Items Addressed This Visit None Visit Diagnoses Acute pain of left shoulder    -  Primary Relevant Orders AMB POC X-RAY SHOULDER 1 VW (Completed) Fall, initial encounter Ecchymosis on examination Decreased range of motion of shoulder, left Tendonitis of left rotator cuff Contusion of multiple sites of left upper extremity, initial encounter Biceps rupture, proximal, left, initial encounter PAST MEDICAL HISTORY:  
Past Medical History:  
Diagnosis Date  Hypertension PAST SURGICAL HISTORY: History reviewed. No pertinent surgical history. ALLERGIES: No Known Allergies CURRENT MEDICATIONS:  A list of medications prior to the time of admission include: 
Prior to Admission medications Medication Sig Start Date End Date Taking? Authorizing Provider HYDROcodone-acetaminophen (NORCO) 5-325 mg per tablet Take 1 Tab by mouth every six (6) hours as needed for Pain for up to 3 days. Max Daily Amount: 4 Tabs. 5/16/19 5/19/19 Yes Lela Kaba PA  
mycophenolate mofetil (CELLCEPT) 250 mg capsule Take 1 Cap by mouth two (2) times a day. 6/7/18  Yes Louis West NP  
calcium-cholecalciferol, d3, (CALCIUM 600 + D) 600-125 mg-unit tab Take  by mouth. Yes Provider, Historical  
diclofenac (VOLTAREN) 1 % gel Apply 4 g to affected area two (2) times a day. Apply to affected area as directed. 10/23/17  Yes Bradley Huntley MD  
hydroCHLOROthiazide (HYDRODIURIL) 12.5 mg tablet Take 12.5 mg by mouth daily. Yes Provider, Historical  
naproxen (NAPROSYN) 500 mg tablet Take 1 Tab by mouth two (2) times daily (with meals) for 10 days. 5/16/19 5/26/19  Vidal Kaba PA  
 
 
FAMILY HISTORY:  
Family History Problem Relation Age of Onset  Breast Cancer Mother 59 SOCIAL HISTORY:  
Social History Socioeconomic History  Marital status:  Spouse name: Not on file  Number of children: Not on file  Years of education: Not on file  Highest education level: Not on file Tobacco Use  
  Smoking status: Former Smoker  Smokeless tobacco: Never Used Substance and Sexual Activity  Alcohol use: No  
 Drug use: Never ROS: Patient denies any chest pain, no shortness of breath, no fever chills or night sweats, no rash or itching, she has no history of heart attack, stroke. No nausea or vomiting. Pain per HPI associated with the left shoulder and left hip. EXAM: Reveals a healthy-appearing well-developed well-nourished pleasant 46-year-old generally fit -American female atraumatic normocephalic alert and oriented x3 found sitting comfortably on the examination room table. Her left upper extremity is in a sling with the forearm flexed at 90 degrees. With the assistance of [facility] LPN the patient was removed of her sling and placed in a gown with a Tog the left anterior upper a presentation exposing the left shoulder. The left anterior upper arm associated with an area of ecchymosis roughly about 6-1/2 cm in width by 8 cm in height. There is no evidence of skin breakdown or infection the area is moderately tender to palpation. She has pain in the distal AC joint. There is also moderate tenderness to palpation at the origin of the biceps tendon extending to the mid belly of the biceps. She has a deformity associated of the distal anterior left upper arm which may represent a partial versus full rupture of the biceps when compared to the right upper extremity which represents no deformity. She has no active flexion of the left arm at the elbow. Her extension passively is guarded and intact to -10 degrees to full. Distal sensation intact fully left upper extremity. Left shoulder range of motion with left arm flexed at 90 degrees passively painful beyond 10 degrees in the forward flexion plane. Passive external rotation of the left shoulder painful at 10 degrees. Internal rotation on tested as well as glenohumeral abduction on tested. Examination of the left hip reveals skin intact no evidence of bruising. Minimal tenderness over the greater trochanteric region. With patient sitting on the exam table left knee flexed at 90 degrees passive internal and external rotation of the left hip intact without pain at 15 and 18 degrees respectively. Hip flexor strength slightly guarded against resistance at 5-/5 distal sensation intact fully left lower extremity cap refill is brisk and less than 2 seconds to the left lower extremity. Quad and femoral nerve activity full left lower extremity. DIAGNOSTIC IMAGING: Imaging obtained from [facility] date of service 16 May 2019 as below: 1. Study Result EXAM:  Left shoulder series. 
  
CLINICAL INDICATION:  Shoulder pain after a fall. 
  
COMPARISON:  None 
  
TECHNIQUE:  AP view of the shoulder in internal rotation and scapula Y view. 
  
FINDINGS:   
  
- No convincing evidence of acute fracture is detected. The humeral head 
appears to be slightly superiorly migrated with respect to the glenoid. - The acromion demonstrates unusual complex contour irregularity on the frontal 
view. Perhaps secondary to the presence of os acromiale. - The included portions of the left lung demonstrates a poorly circumscribed 
irregular density with stellate margins measuring about 1.0 x 0.8 cm. 
  
IMPRESSION IMPRESSION:   
  
1. No acute fracture. 2.  Questionable cephalad migration of the humeral head with respect to the 
glenoid. This may suggest presence of rotator cuff injury. 3.  Atypical irregular contour of the acromion. Possibly related to os 
acromiale. 4.  Poorly circumscribed potential nodular density in the left lung. Recommend 
dedicated PA and lateral views of the chest for further delineation. 
   
 
2.  Study result EXAM:  Left Hip Series 
  INDICATION:  Fall. Limited range of motion.   Crepitus. 
  
COMPARISON:  None. 
  
 TECHNIQUE:  AP pelvis and frog-leg lateral views. 
  
FINDINGS:  
  
- No convincing evidence of acute hip fracture is detected. - No expansile or lytic bony lesion is appreciated. - Mild marginal osteophytes are noted in the left femoral head inferior medial 
aspect. Slight joint space narrowing in the left hip compared to the right. - Included portions of the lower lumbar region demonstrate fairly pronounced 
facet hypertrophy at L4-5 or L5-S1. 
  
IMPRESSION IMPRESSION: 
  
1. No acute fracture or subluxation. 2.  Left hip joint with mild osteoarthrosis. 3.  Degenerative changes in the included portions of the  
 
 
 
 
IMPRESSION: 
1. Status post fall with contusion and bruising of the left shoulder possibly with internal derangement. 2.  Decreased range of motion of the left forearm against resistance with a probable biceps tendon rupture 3. Left shoulder pain 4. Contusion to the left hip 5. Left hip pain 6. 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 Osteoarthritis of the left hip PLAN: Patient was placed in a black large well-padded comfortable sling with left arm flexed at 90 degrees. With the acuteness of her injury and the discomfort that she is currently experiencing when I give her a week for her symptoms to settle and then order an MRI of the left shoulder to assess both internal derangement and a probable biceps tendon rupture. She has medication for pain that was provided at the emergency room in the form of hydrocodone and Naprosyn which she will continue to use it. She is to limit her lift push pull carry to 1 pound of the left upper extremity. Today all of her questions were answered to her satisfaction copy of her x-rays reviewed and provided. 1.) Please continue current medications as prescribed for pain recommend Tylenol / Motrin per manufactures recommendations 2.) Please maintain current level of activity as discussed at visit today, implement the discussed RICE regimen. 3.) Provider will initiate and/or consider initiating physical therapy to assist in patient's safe recoveries. 4.) Ok to apply ice or a cold pack with a towel over the skin for 15 min on and 45 minutes off and may repeat up to 4 times within 24 hours. 5.) Provider discussed the risk of falls , mobility assisted device questions discussed thoroughly. 6.) Treat the  
Problem List Items Addressed This Visit None Visit Diagnoses Acute pain of left shoulder    -  Primary Relevant Orders AMB POC X-RAY SHOULDER 1 VW (Completed) Fall, initial encounter Ecchymosis on examination Decreased range of motion of shoulder, left Tendonitis of left rotator cuff Contusion of multiple sites of left upper extremity, initial encounter Biceps rupture, proximal, left, initial encounter      
  
 presenting medical problem(s) per protocol established on \"evidence based methods\". 7.) Provider recommended to the patient future action(s) that could limit the medical condition from re occurring, and or continue to support their recoveries. 8.) Establish a comprehensive follow up plan that is logical, and accessible for patient to follow with all pertinent medical providers, and or facilities in a timely fashion in order to      continue continuity of care. 9.) Patient provided a reminder for a \"due or due soon\" health maintenance. I have asked the patient to schedule an appointment with their primary care provider for follow-up on general  health maintenance. Concerns. Today all the patient's questions were answered to their satisfaction. Copies of x-rays reviewed and provided. Mukesh Hudson [address] [place] [address]

## 2019-05-16 NOTE — ED TRIAGE NOTES
Per EMS, patient was taking her morning walk around the pool tripped over an up rising in the concrete. Limited ROM, no crepitus noted. When she feel she hit her glasses, HTN but compliant with her medications. Patient denies any LOC.

## 2019-05-16 NOTE — ED PROVIDER NOTES
EMERGENCY DEPARTMENT HISTORY AND PHYSICAL EXAM 
 
9:32 AM 
 
 
Date: 5/16/2019 Patient Name: Elli Barnhart History of Presenting Illness Chief Complaint Patient presents with  Shoulder Pain History Provided By: Patient Additional History (Context): Elli Barnhart is a 68 y.o. female with hypertension and autoimmune hepatitis who presents with left anterior shoulder pain after a fall this morning. Patient states she was walking down a  ramp on the deck of her pool and mistepped and fell, hitting the concrete. Patient states she hit her left hip and left shoulder. Patient also states her glasses broke on the left side but isn't sure if she hit her head. Patient was able to ambulate to a chair after fall and call an ambulance. .  Patient states she did not take her HCTZ for HTN this morning or eat breakfast as she planned on doing it after her walk. Shoulder pain described as achy/dull at the acromioclaviculare and glenohumeral joints, 6/10 severity at rest, 10/10 severity with movement or palpation. Patient has decreased range of motion in the shoulder and states she can't move it at all without significant pain. Hip pain described as sharp at the ASIS when palpated, 4/10 severity, no decreased range of motion or pain with ambulation. Patient denies N/V, LOC, confusion, visual disturbances, tinnitus, HA, SOB, chest pain, palpitations, abd pain, muscle weakness, paresthesia. Patient denies use of blood thinners. PCP: Diane Forbes MD 
 
Current Outpatient Medications Medication Sig Dispense Refill  naproxen (NAPROSYN) 500 mg tablet Take 1 Tab by mouth two (2) times daily (with meals) for 10 days. 20 Tab 0  
 HYDROcodone-acetaminophen (NORCO) 5-325 mg per tablet Take 1 Tab by mouth every six (6) hours as needed for Pain for up to 3 days. Max Daily Amount: 4 Tabs.  12 Tab 0  
 mycophenolate mofetil (CELLCEPT) 250 mg capsule Take 1 Cap by mouth two (2) times a day. 180 Cap 3  
 calcium-cholecalciferol, d3, (CALCIUM 600 + D) 600-125 mg-unit tab Take  by mouth.  diclofenac (VOLTAREN) 1 % gel Apply 4 g to affected area two (2) times a day. Apply to affected area as directed. 100 g 1  
 hydroCHLOROthiazide (HYDRODIURIL) 12.5 mg tablet Take 12.5 mg by mouth daily. Past History Past Medical History: No past medical history on file. Past Surgical History: No past surgical history on file. Family History: 
Family History Problem Relation Age of Onset  Breast Cancer Mother 59 Social History: 
Social History Tobacco Use  Smoking status: Former Smoker  Smokeless tobacco: Never Used Substance Use Topics  Alcohol use: No  
 Drug use: Not on file Allergies: 
No Known Allergies Review of Systems Review of Systems Constitutional: Negative for chills and fever. HENT: Negative for ear pain, hearing loss, nosebleeds and tinnitus. Eyes: Negative for pain and visual disturbance. Respiratory: Negative for shortness of breath. Cardiovascular: Negative for chest pain and palpitations. Gastrointestinal: Negative for abdominal pain, nausea and vomiting. Musculoskeletal: Positive for neck pain. Negative for gait problem. Skin: Positive for wound (small superficial abrasion on lateral left shoudler). Neurological: Positive for dizziness. Negative for weakness, numbness and headaches. Psychiatric/Behavioral: Negative for confusion. Physical Exam  
 
Visit Vitals /73 (BP 1 Location: Right arm, BP Patient Position: At rest) Pulse 61 Temp 97.1 °F (36.2 °C) Resp 18 SpO2 100% Physical Exam  
Constitutional: She is oriented to person, place, and time. She appears well-developed and well-nourished. No distress. HENT:  
Head: Normocephalic. Head is without raccoon's eyes, without Nash's sign, without abrasion, without contusion and without laceration. Right Ear: Tympanic membrane, external ear and ear canal normal. No drainage, swelling or tenderness. Tympanic membrane is not perforated. No hemotympanum. Left Ear: Tympanic membrane, external ear and ear canal normal. No drainage, swelling or tenderness. Tympanic membrane is not perforated. No hemotympanum. Nose: Nose normal. No nose lacerations, sinus tenderness or nasal deformity. No epistaxis. Mouth/Throat: Uvula is midline, oropharynx is clear and moist and mucous membranes are normal.  
Eyes: Pupils are equal, round, and reactive to light. Neck: Trachea normal and normal range of motion. Neck supple. Spinous process tenderness (mild) present. No muscular tenderness present. No neck rigidity. No tracheal deviation, no edema, no erythema and normal range of motion present. Cardiovascular: Normal rate, regular rhythm, normal heart sounds and intact distal pulses. Pulmonary/Chest: Effort normal and breath sounds normal. No respiratory distress. She exhibits no tenderness (no crepitus or deformity to chest wall ). Abdominal: Soft. Bowel sounds are normal. She exhibits no distension. There is no tenderness. There is no rebound and no guarding. Musculoskeletal:  
     Left shoulder: She exhibits decreased range of motion, tenderness (acromioclavicular and glenohumeral joints), bony tenderness, swelling and pain. She exhibits no effusion, no crepitus, no deformity, no laceration (minor abrasion) and normal pulse. Left elbow: Normal. She exhibits normal range of motion (full ROM of joint ). Left wrist: Normal.  
     Left hip: She exhibits tenderness (ASIS) and bony tenderness. She exhibits normal range of motion, normal strength, no swelling, no crepitus, no deformity and no laceration. Left knee: Normal.  
     Left ankle: Normal.  
Full ROM of lower extremities, normal gait Lymphadenopathy:  
  She has no cervical adenopathy. Neurological: She is alert and oriented to person, place, and time. She has normal strength. She is not disoriented. She displays normal reflexes. No cranial nerve deficit or sensory deficit. Coordination and gait normal.  
Skin: Skin is warm and dry. Psychiatric: She has a normal mood and affect. Her behavior is normal. Judgment and thought content normal.  
Nursing note and vitals reviewed. Diagnostic Study Results Labs - No results found for this or any previous visit (from the past 12 hour(s)). Radiologic Studies -  
XR CHEST PA LAT Final Result IMPRESSION:  
  
1. Questionable focal area of architectural distortion or scarring in the left  
upper lung zone. Depending on risk factors, either a follow-up chest x-ray  
after resolution of current discomfort or correlation with CT may be appropriate  
options. 2.  No acute air space disease.  
  
  
- Note:  When a repeat chest x-ray in PA position was attempted, the patient  
refused the study because of discomfort in the shoulder region. Current study  
is being reviewed without this requested additional view. CT HEAD WO CONT Final Result IMPRESSION:  
1. No acute intracranial process identified. CT SPINE CERV WO CONT Final Result IMPRESSION:  
1. No evidence of acute cervical spine fracture. 2.  Multilevel degenerative disc disease most pronounced at C4-C5 through C6-C7. XR SHOULDER LT AP/LAT MIN 2 V Final Result IMPRESSION:    
  
1. No acute fracture. 2.  Questionable cephalad migration of the humeral head with respect to the  
glenoid. This may suggest presence of rotator cuff injury. 3.  Atypical irregular contour of the acromion. Possibly related to os  
acromiale. 4.  Poorly circumscribed potential nodular density in the left lung. Recommend  
dedicated PA and lateral views of the chest for further delineation. XR HIP LT W OR WO PELV 2-3 VWS Final Result IMPRESSION:  
  
1. No acute fracture or subluxation. 2.  Left hip joint with mild osteoarthrosis. 3.  Degenerative changes in the included portions of the lower lumbar spine. Medical Decision Making I am the first provider for this patient. I reviewed the vital signs, available nursing notes, past medical history, past surgical history, family history and social history. Vital Signs-Reviewed the patient's vital signs. Records Reviewed: Nursing Notes and Old Medical Records (Time of Review: 9:32 AM) 
 
ED Course: Progress Notes, Reevaluation, and Consults: 
10:45PM: Reviewed results with patient up until this point. Ordered CXR  
12:07 PM: Pt back from CT. Resting comfortably, smiling, talkative. Pain reduced to 3/10. 
12:36 PM Reviewed results with patient and sister. Printed reports for pt's records. Discussed shoulder x-ray with evidence of possible rotator cuff injury and need for close follow-up with orthopedic. Discussed incidental finding of lung nodule and scarring on x-ray and need for close follow-up with PMD for repeat imaging. Discussed strict return precautions, including vomiting, weakness, or any other medical concerns. Provider Notes (Medical Decision Making): 61-year-old female who presents due to left shoulder and left hip pain after a trip and fall. Alert and oriented, no neurologic deficits on examination. CT head and cervical spine negative for acute process. Shoulder x-ray demonstrates possible ligamentous or muscular injury, no evidence of fracture. Hip x-ray without acute process. Discussed incidental finding of lung nodule on x-rays, patient will follow-up with PMD.  Will discharge with sling, pain control, referral to orthopedic, and PMD for outpatient follow-up Diagnosis Clinical Impression: 1. Contusion of left shoulder, initial encounter 2. Lung nodule 3. Injury of head, initial encounter 4. Contusion of left hip, initial encounter Disposition: home Follow-up Information Follow up With Specialties Details Why Contact Info [facility] EMERGENCY DEPT Emergency Medicine  If symptoms worsen [phone] [id number] [phone] [address] [facility], [address] [facility] Orthopedic Surgery Schedule an appointment as soon as possible for a visit  [address] [facility], [address] [facility] [address] 
[phone] Kamala Stratton MD Internal Medicine In 2 days  Hallie F[address] [facility] [address] [address] 
[phone] Patient's Medications Start Taking HYDROCODONE-ACETAMINOPHEN (NORCO) 5-325 MG PER TABLET    Take 1 Tab by mouth every six (6) hours as needed for Pain for up to 3 days. Max Daily Amount: 4 Tabs. NAPROXEN (NAPROSYN) 500 MG TABLET    Take 1 Tab by mouth two (2) times daily (with meals) for 10 days. Continue Taking CALCIUM-CHOLECALCIFEROL, D3, (CALCIUM 600 + D) 600-125 MG-UNIT TAB    Take  by mouth. DICLOFENAC (VOLTAREN) 1 % GEL    Apply 4 g to affected area two (2) times a day. Apply to affected area as directed. HYDROCHLOROTHIAZIDE (HYDRODIURIL) 12.5 MG TABLET    Take 12.5 mg by mouth daily. MYCOPHENOLATE MOFETIL (CELLCEPT) 250 MG CAPSULE    Take 1 Cap by mouth two (2) times a day. These Medications have changed No medications on file Stop Taking No medications on file Dictation disclaimer:  Please note that this dictation was completed with [employer], the [employer] voice recognition software. Quite often unanticipated grammatical, syntax, homophones, and other interpretive errors are inadvertently transcribed by the computer software. Please disregard these errors. Please excuse any errors that have escaped final proofreading.

## 2019-05-23 ENCOUNTER — OFFICE VISIT (OUTPATIENT)
Dept: ORTHOPEDIC SURGERY | Facility: CLINIC | Age: 74
End: 2019-05-23

## 2019-05-23 VITALS
HEIGHT: 67 IN | OXYGEN SATURATION: 100 % | TEMPERATURE: 97.6 F | WEIGHT: 162.2 LBS | HEART RATE: 80 BPM | BODY MASS INDEX: 25.46 KG/M2 | DIASTOLIC BLOOD PRESSURE: 73 MMHG | SYSTOLIC BLOOD PRESSURE: 128 MMHG

## 2019-05-23 DIAGNOSIS — M75.82 TENDONITIS OF LEFT ROTATOR CUFF: ICD-10-CM

## 2019-05-23 DIAGNOSIS — M25.612 DECREASED RANGE OF MOTION OF SHOULDER, LEFT: ICD-10-CM

## 2019-05-23 DIAGNOSIS — M25.512 ACUTE PAIN OF LEFT SHOULDER: ICD-10-CM

## 2019-05-23 DIAGNOSIS — R58 ECCHYMOSIS ON EXAMINATION: Primary | ICD-10-CM

## 2019-05-23 DIAGNOSIS — S40.022A CONTUSION OF MULTIPLE SITES OF LEFT UPPER EXTREMITY, INITIAL ENCOUNTER: ICD-10-CM
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
## 2019-05-23 NOTE — PROGRESS NOTES
HISTORY OF PRESENT ILLNESS:  Rosenda Rolon returns to the office status post injuries when she fell to the left shoulder. She has been wearing her sling near 24/7 only removing it for hygiene purposes and gentle range of motion around the house. Generally, she reports feeling better. She has achy pains of the shoulder. She is able now, today, to resist with her forearm on flexion maneuvers, whereas previously, she was somewhat guarded, and there was a suspicion for a biceps tendon rupture. PHYSICAL EXAM:  She has spreadding ecchymosis over the anterior mid-biceps region measuring roughly cranial/caudal 10 centimeters by width and medial/lateral 6 centimeters. There is no evidence of skin breakdown or infection. There is diffuse tenderness associated with that area. The origin and insertion of both biceps tendons reveal they are minimally tender. She is moderately sore to palpation in the anterior aspect of the left shoulder. She has poor range of motion actively in the forward flexion plane and with passive assistance, I can get her to 90ø. Her pain increases in the adduction plane with passive assistance. Passive external rotation, again with guarding and pain throughout at 15ø. Distal sensation is intact fully to the left upper extremity. PLAN:   At this point, we are going to go ahead and start her in outpatient physical therapy for gentle range of motion and stretching twice a week for a few weeks, as well as a home exercise program.  She was advised to continue her sling when out in public or moving around the house. She may, then, remove this for hygiene purposes and during the course of the day to perform Codman exercises, which she was demonstrated. We will plan on seeing her back in about 10 days to two weeks.   Consideration for an MRI if the patient remains symptomatic with poor range of motion in the forward flexion and external/internal rotation plane, as she progresses through therapies from her fall. Today, all her questions were answered to her satisfaction.

## 2019-05-31 ENCOUNTER — HOSPITAL ENCOUNTER (OUTPATIENT)
Dept: PHYSICAL THERAPY | Age: 74
Discharge: HOME | End: 2019-05-31
Payer: MEDICARE

## 2019-05-31 PROCEDURE — 97110 THERAPEUTIC EXERCISES: CPT

## 2019-05-31 PROCEDURE — 97161 PT EVAL LOW COMPLEX 20 MIN: CPT

## 2019-05-31 PROCEDURE — 97014 ELECTRIC STIMULATION THERAPY: CPT

## 2019-05-31 NOTE — PROGRESS NOTES
PT DAILY TREATMENT NOTE/SHOULDER EVAL 10-18    Patient Name: Paola Mathew  Date:2019  : 1945  [x]  Patient  Verified  Payor: Yovany Whitfield / Plan: [address] [place] [address] HMO / Product Type: Managed Care Medicare /    In time:915  Out time:1030  Total Treatment Time (min): 75  Visit #: 1 of 10    Medicare/BCBS Only   Total Timed Codes (min):  75 1:1 Treatment Time:  30       Treatment Area: Pain in left shoulder [M25.512]    SUBJECTIVE  Pain Level (0-10 scale): current = 7/10, best = 4/10, worst = 10/10  Aggravating = movement ie reaching, sleeping, normal ADLs, dressing and grooming \"I am limited to what I can and cannot do\"  Alleviating = taking medication, being still   [x]constant []intermittent [x]improving []worsening []no change since onset    Any medication changes, allergies to medications, adverse drug reactions, diagnosis change, or new procedure performed?: [x] No    [] Yes (see summary sheet for update)  Subjective functional status/changes:     HPI:  Pt reports to PT s/p a fall onto her left side on 19. Pt reports she was at her community pool when she stepped on a 2-3in ledge incorrectly while exiting the pool area, slipped, and fell hard onto her left side. Pt reports she was unable to get up (I). Pt was transported to [facility] where she received negative arm, hip, and leg xrays and a sling. Pt says she was told she possibly has a ton muscles in her arm and may have an MRI depending on PT results for RTC. Pt is generally healthy and active. She likes to walk in the mornings, play golf, swim,[employer], and travel. This was pt's first fall and she has been fearful of more falls since.     PMHx/Surgical Hx: HTN  Work Hx: retired  Living Situation: high rise building with elevator and on the 4th floor, lives alone  Pt Goals: \"to feel 100% better without pain, full mobility of the arm and do all the activities prior to the accident including playing golf.\"    OBJECTIVE/EXAMINATION    Modality rationale: decrease edema, decrease inflammation and decrease pain to improve the patients ability to tolerate prolonged postures   Min Type Additional Details   15 [x] Estim:  [x]Unatt       [x]IFC  []Premod                        []Other:  [x]w/ice   []w/heat  Position: seated  Location: left shoulder     [] Estim: []Att    []TENS instruct  []NMES                    []Other:  []w/US   []w/ice   []w/heat  Position:  Location:    []  Traction: [] Cervical       []Lumbar                       [] Prone          []Supine                       []Intermittent   []Continuous Lbs:  [] before manual  [] after manual    []  Ultrasound: []Continuous   [] Pulsed                           []1MHz   []3MHz Location:  W/cm2:    []  Iontophoresis with dexamethasone         Location: [] Take home patch   [] In clinic    []  Ice     []  heat  []  Ice massage  []  Laser   []  Anodyne Position:  Location:    []  Laser with stim  []  Other: Position:  Location:    []  Vasopneumatic Device Pressure:       [] lo [] med [] hi   Temperature: [] lo [] med [] hi   [] Skin assessment post-treatment:  [x]intact []redness- no adverse reaction    []redness - adverse reaction:     30 min [x]Eval                  []Re-Eval       30 min Therapeutic Exercise:  [x] See flow sheet : demonstration, performance, distribution of HEP ; attempted sidelying scapular mobilizations, however, pt could not tolerate    Rationale: increase ROM, increase strength and increase proprioception to improve the patients ability to perform ADLs          With   [x] TE   [] TA   [] neuro   [] other: Patient Education: [x] Review HEP    [] Progressed/Changed HEP based on:   [] positioning   [] body mechanics   [] transfers   [] heat/ice application    [] other:        Physical Therapy Evaluation - Shoulder    Posture: antalgic with left shoulder lower than left    Gait: mildly antalgic    SHOULDER ROM: all left shoulder AROM = p                                           AROM                                                              PROM   Right Left  Right Left   Flexion 135 Seated 38  Supine 70 Flexion  Supine 135  Table slide 115   Scaption/ Seated 50 Scaptin/ABD  80   Functional ER T2 unable ER @ scaption   Supine 30   Functional IR T10 Unable  IR @ scaption  Supine 75     SHOULDER Strength:   [x] Unable to assess left side at this time                                                                            R (1-5)   Flexors 4+   Abductors 5   External Rotators 4+   Internal Rotators 5   Lower Trapezius 4-   Rhomboids 4-   Elbow Extension/flexion 5            HIP                Strength (1-5)  Hip Right Left   Flexion 4+ 4+   Extension 4- 4-   Abduction 4- 4-   ER 4 4-   IR 5 4+   Knee Right Left   Extension 5 5-   Flexion 5- 5-     Hip supine IR/ER PROM = limited on left limited to 25degrees each with pain into groin with both; right PROM ER = 25, IR = 35    Palpation: TTP to left posterior RTC, upper traps, levator scap, medial border muscles, scalenes; TTP to left greater trochanter and gluteus medius/mahamed    Other Tests  - NBOS with EC = 30sec  - Tandem with EO: Right back = 30sec, left back = 27seconds with mod sway  - 5x sit to stand = 10.22seconds with significant B knee valgus    Pain Level (0-10 scale) post treatment: 3    ASSESSMENT/Changes in Function: Pt is a 68yo female who presents to PT s/p a fall onto the left side on 5/16/19. Pt demonstrates signs and symptoms consistent with left RTC pathology and left traumatic greater trochanteric bursitis. Pt demonstrates decreased shoulder AROM and strength secondary to pain, preventing differentiation between tendonitis, partial thickness or full thickness RTC pathology. Pt also demonstrates decreased hip strength and LE flexibility. Pt deficits impair her ADLs, grooming, and participation in leisure activities.  Pt will benefit from skilled PT in order to address previously listed impairments, decrease pain, and maximize functional mobility. Patient will continue to benefit from skilled PT services to modify and progress therapeutic interventions, address functional mobility deficits, address ROM deficits, address strength deficits, analyze and address soft tissue restrictions, analyze and cue movement patterns, analyze and modify body mechanics/ergonomics, assess and modify postural abnormalities, instruct in home and community integration and balance to attain remaining goals. [x]  See Plan of Care  []  See progress note/recertification  []  See Discharge Summary         Progress towards goals / Updated goals:  Short Term Goals: To be accomplished in 2 weeks:     1. Pt will report compliance with initial HEP   eval = established  2. Pt will demonstrate left shoulder AROM flexion to 145 in order to improve ability to perform dressing   Eval = 38  3. Pt will demonstrate left shoulder AROM functional ER to T2 in order to improve ability to bathe    Eval = unable, supine PROM = 30  4. Pt will demonstrate left shoulder AROM functional IR to T10 in order to improve ability to don belt and/or undergarments   Eval = unable, supine PROM = 75     Long Term Goals: To be accomplished in 10 weeks:     1. Pt will score at least 59 on FOTO in order to improve overall function, decrease pain, and facilitate return to PLOF. Eval = 20  2. Pt will demonstrate left shoulder strength to at least 4+/5 in order to improve ability to lift objects shoulder height and perform heavy ADLs   Eval = unable secondary to pain  3. Pt will report max pain 2 in order to dress and perform ADLs at PLOF. Eval = 10/10  4.    Pt will demonstrate right hip and knee strength minimum of 5-/5 in order to negotiation stairs at PLOF   Eval = range 3-5-/10        PLAN  [x]  Upgrade activities as tolerated     [x]  Continue plan of care  []  Update interventions per flow sheet       [] Discharge due to:_  []  Other:_      Pema Cotto, PT 5/31/2019  9:15 AM

## 2019-05-31 NOTE — PROGRESS NOTES
[facility] [place] [id number]  [address], [address] [phone] fax    Plan of Care/ Statement of Necessity for Physical Therapy Services           Patient name: Eletha Peabody Start of Care: 2019   Referral source: Shay Church : 1945    Medical Diagnosis: Pain in left shoulder [M25.512]  Payor: Madeleine Harvey / Plan: [address] [place] [address] HMO / Product Type: Managed Care Medicare /  Onset Date:19    Treatment Diagnosis: left hip and shoulder pain   Prior Hospitalization: see medical history Provider#: [id number]   Medications: Verified on Patient summary List    Comorbidities: HTN, recent fall   Prior Level of Function: functionally (I)     The Plan of Care and following information is based on the information from the initial evaluation. Assessment/ key information: Pt is a 68yo female who presents to PT s/p a fall onto the left side on 19. Pt demonstrates signs and symptoms consistent with left RTC pathology and left traumatic greater trochanteric bursitis. Pt demonstrates decreased shoulder AROM and strength secondary to pain, preventing differentiation between tendonitis, partial thickness or full thickness RTC pathology. Pt also demonstrates decreased hip strength and LE flexibility. Pt deficits impair her ADLs, grooming, and participation in leisure activities. Pt will benefit from skilled PT in order to address previously listed impairments, decrease pain, and maximize functional mobility. Evaluation Complexity History LOW Complexity : Zero comorbidities / personal factors that will impact the outcome / POC; Examination MEDIUM Complexity : 3 Standardized tests and measures addressing body structure, function, activity limitation and / or participation in recreation  ;Presentation MEDIUM Complexity : Evolving with changing characteristics  ; Clinical Decision Making HIGH Complexity : FOTO score of 1- 25   Overall Complexity Rating: LOW   Problem List: pain affecting function, decrease ROM, decrease strength, impaired gait/ balance, decrease ADL/ functional abilitiies, decrease activity tolerance, decrease flexibility/ joint mobility and decrease transfer abilities   Treatment Plan may include any combination of the following: Therapeutic exercise, Therapeutic activities, Neuromuscular re-education, Physical agent/modality, Gait/balance training, Manual therapy, Patient education, Self Care training, Functional mobility training and Stair training  Patient / Family readiness to learn indicated by: asking questions, trying to perform skills and interest  Persons(s) to be included in education: patient (P)  Barriers to Learning/Limitations: None  Patient Goal (s): to feel 100% better without pain, full mobility of the arm and do all the activities prior to the accident including playing golf. \"  Patient Self Reported Health Status: good  Rehabilitation Potential: good  Short Term Goals: To be accomplished in 2 weeks:    1. Pt will report compliance with initial HEP  2.   Pt will demonstrate left shoulder AROM flexion to 145 in order to improve ability to perform dressing  3. Pt will demonstrate left shoulder AROM functional ER to T2 in order to improve ability to bathe   4. Pt will demonstrate left shoulder AROM functional IR to T10 in order to improve ability to don belt and/or undergarments    Long Term Goals: To be accomplished in 10 weeks:    1. Pt will score at least 59 on FOTO in order to improve overall function, decrease pain, and facilitate return to PLOF. 2.   Pt will demonstrate left shoulder strength to at least 4+/5 in order to improve ability to lift objects shoulder height and perform heavy ADLs  3. Pt will report max pain 2 in order to dress and perform ADLs at PLOF. 4.   Pt will demonstrate right hip and knee strength minimum of 5-/5 in order to negotiation stairs at PLOF.      Frequency / Duration: Patient to be seen 2 times per week for 10 treatments. Certification Period: 5/31/19-6/30/19    Patient/ Caregiver education and instruction: Diagnosis, prognosis, self care, activity modification and exercises   [x]  Plan of care has been reviewed with CAIO Jennings, PT 5/31/2019 9:14 AM    ________________________________________________________________________    I certify that the above Therapy Services are being furnished while the patient is under my care. I agree with the treatment plan and certify that this therapy is necessary.     Physician's Signature:____________Date:_________TIME:________    ** Signature, Date and Time must be completed for valid certification **    Please sign and return to [facility] [place] [address]  [address] [place], [address] [phone] fax

## 2019-06-05 ENCOUNTER — HOSPITAL ENCOUNTER (OUTPATIENT)
Dept: PHYSICAL THERAPY | Age: 74
Discharge: HOME | End: 2019-06-05
Payer: MEDICARE

## 2019-06-05 PROCEDURE — 97110 THERAPEUTIC EXERCISES: CPT

## 2019-06-05 PROCEDURE — 97014 ELECTRIC STIMULATION THERAPY: CPT

## 2019-06-05 PROCEDURE — 97140 MANUAL THERAPY 1/> REGIONS: CPT

## 2019-06-05 NOTE — PROGRESS NOTES
PT DAILY TREATMENT NOTE 10-18    Patient Name: Luh Mayo  Date:2019  : 1945  [x]  Patient  Verified  Payor: Hue Luu / Plan: [address] [place] [address] HMO / Product Type: Managed Care Medicare /    In time:234  Out time:347  Total Treatment Time (min): 68  Visit #: 2 of 10    Medicare/BCBS Only   Total Timed Codes (min):  58 1:1 Treatment Time:  62       Treatment Area: Pain in left shoulder [M25.512]    SUBJECTIVE  Pain Level (0-10 scale): 4  Any medication changes, allergies to medications, adverse drug reactions, diagnosis change, or new procedure performed?: [x] No    [] Yes (see summary sheet for update)  Subjective functional status/changes:   [] No changes reported  Pt reports she has been able to increase the amount and quality of sleep she is getting. Her pain today is primarily in her upper arm and groin area.      OBJECTIVE    Modality rationale: decrease edema, decrease inflammation and decrease pain to improve the patients ability to swing ue during gait   Min Type Additional Details   15 [x] Estim:  [x]Unatt       [x]IFC  []Premod                        []Other:  [x]w/ice   []w/heat  Position: long sitting  Location: left shoulder    [] Estim: []Att    []TENS instruct  []NMES                    []Other:  []w/US   []w/ice   []w/heat  Position:  Location:    []  Traction: [] Cervical       []Lumbar                       [] Prone          []Supine                       []Intermittent   []Continuous Lbs:  [] before manual  [] after manual    []  Ultrasound: []Continuous   [] Pulsed                           []1MHz   []3MHz W/cm2:  Location:    []  Iontophoresis with dexamethasone         Location: [] Take home patch   [] In clinic    []  Ice     []  heat  []  Ice massage  []  Laser   []  Anodyne Position:  Location:    []  Laser with stim  []  Other:  Position:  Location:    []  Vasopneumatic Device Pressure:       [] lo [] med [] hi   Temperature: [] lo [] med [] hi   [] Skin assessment post-treatment:  [x]intact []redness- no adverse reaction    []redness - adverse reaction:     48 min Therapeutic Exercise:  [x] See flow sheet :   Rationale: increase ROM and increase strength to improve the patients ability to perform ADLs    10 min Manual Therapy:  Long axis distraction of left LE; grade 2-4 anterior to posterior GH mobilizations to decrease pain and increase flexion, ER; soft tissue mobilization of left shoulder complex   Rationale: decrease pain, increase ROM and increase tissue extensibility to ambulate and reach overhead with decreased pain        With   [x] TE   [] TA   [] neuro   [] other: Patient Education: [x] Review HEP    [] Progressed/Changed HEP based on:   [] positioning   [] body mechanics   [] transfers   [] heat/ice application    [] other:      Other Objective/Functional Measures: standing left shoulder flexion AROM = 53, supine AAROM shoulder flexion = 130     Pain Level (0-10 scale) post treatment: 3    ASSESSMENT/Changes in Function: Pt tolerated initiation of full exercise program without increase in symptoms. Pt is progressing and had an increase of 15degrees of active flexion but still remains severely limited by pain. Patient will continue to benefit from skilled PT services to modify and progress therapeutic interventions, address functional mobility deficits, address ROM deficits, address strength deficits, analyze and address soft tissue restrictions, analyze and cue movement patterns, analyze and modify body mechanics/ergonomics and assess and modify postural abnormalities to attain remaining goals. [x]  See Plan of Care  []  See progress note/recertification  []  See Discharge Summary         Progress towards goals / Updated goals:  Short Term Goals: To be accomplished in 2 weeks:   1.   Pt will report compliance with initial HEP              eval = established   Current = reports initial compliance  2.   Pt will demonstrate left shoulder AROM flexion to 145 in order to improve ability to perform dressing              Eval = 38   Current = 53  3.   Pt will demonstrate left shoulder AROM functional ER to T2 in order to improve ability to bathe               Eval = unable, supine PROM = 30  4.   Pt will demonstrate left shoulder AROM functional IR to T10 in order to improve ability to don belt and/or undergarments              Eval = unable, supine PROM = 75     Long Term Goals: To be accomplished in 10 weeks: 1.   Pt will score at least 59 on FOTO in order to improve overall function, decrease pain, and facilitate return to OF. Eval = 20  2.   Pt will demonstrate left shoulder strength to at least 4+/5 in order to improve ability to lift objects shoulder height and perform heavy ADLs              Eval = unable secondary to pain  3.   Pt will report max pain 2 in order to dress and perform ADLs at PLOF.                Eval = 10/10  4.   Pt will demonstrate right hip and knee strength minimum of 5-/5 in order to negotiation stairs at PLOF              Eval = range 3-5-/10    PLAN  [x]  Upgrade activities as tolerated     [x]  Continue plan of care  []  Update interventions per flow sheet       []  Discharge due to:_  []  Other:_      Olena Sheehan, PT 6/5/2019  2:38 PM    Future Appointments   Date Time Provider Dyllan Fishman   6/7/2019  3:30 PM ADRIENNE Escobar [facility]   6/12/2019  2:00 PM Eugene Conte [facility]   6/14/2019  8:00 AM ADRIENNE Escobar [facility]   6/17/2019  3:10 PM Minor Patience, NP [id number] [facility]   6/19/2019  2:30 PM ADRIENNE Escobar [facility]   6/21/2019  8:30 AM ADRIENNE Escobar [facility]   6/24/2019 10:30 AM ADRIENNE Escobar [facility]   6/26/2019  8:00 AM ADRIENNE EscobarHS [facility]

## 2019-06-07 ENCOUNTER — HOSPITAL ENCOUNTER (OUTPATIENT)
Dept: PHYSICAL THERAPY | Age: 74
Discharge: HOME | End: 2019-06-07
Payer: MEDICARE

## 2019-06-07 PROCEDURE — 97140 MANUAL THERAPY 1/> REGIONS: CPT

## 2019-06-07 PROCEDURE — 97110 THERAPEUTIC EXERCISES: CPT

## 2019-06-07 PROCEDURE — 97014 ELECTRIC STIMULATION THERAPY: CPT

## 2019-06-07 NOTE — PROGRESS NOTES
PT DAILY TREATMENT NOTE 10-18    Patient Name: Daljit Tovar  Date:2019  : 1945  [x]  Patient  Verified  Payor: Magaly Colon / Plan: [address] [place] [address] HMO / Product Type: Managed Care Medicare /    In time:302  Out time:410  Total Treatment Time (min): 76  Visit #: 3 of 10    Medicare/BCBS Only   Total Timed Codes (min):  53 1:1 Treatment Time:  48       Treatment Area: Pain in left shoulder [M25.512]    SUBJECTIVE  Pain Level (0-10 scale): 4  Any medication changes, allergies to medications, adverse drug reactions, diagnosis change, or new procedure performed?: [x] No    [] Yes (see summary sheet for update)  Subjective functional status/changes:   [] No changes reported  Pt reports increased soreness when she got home on Wednesday so she did not do [AAROM in supine], but was ok Thursday and is doing ok today. She was able to brush her hair more with her right arm and don her baseball cap.      OBJECTIVE    Modality rationale: decrease inflammation, decrease pain and increase tissue extensibility to improve the patients ability to tolerate sustained positions   Min Type Additional Details   15 [x] Estim:  [x]Unatt       [x]IFC  []Premod                        []Other:  [x]w/ice   []w/heat  Position: long sitting  Location:left shoulder    [] Estim: []Att    []TENS instruct  []NMES                    []Other:  []w/US   []w/ice   []w/heat  Position:  Location:    []  Traction: [] Cervical       []Lumbar                       [] Prone          []Supine                       []Intermittent   []Continuous Lbs:  [] before manual  [] after manual    []  Ultrasound: []Continuous   [] Pulsed                           []1MHz   []3MHz W/cm2:  Location:    []  Iontophoresis with dexamethasone         Location: [] Take home patch   [] In clinic    []  Ice     []  heat  []  Ice massage  []  Laser   []  Anodyne Position:  Location:    []  Laser with stim  []  Other:  Position:  Location:    [] Vasopneumatic Device Pressure:       [] lo [] med [] hi   Temperature: [] lo [] med [] hi   [] Skin assessment post-treatment:  [x]intact []redness- no adverse reaction    []redness - adverse reaction:     43 min Therapeutic Exercise:  [x] See flow sheet :   Rationale: increase ROM, increase strength, improve balance and increase proprioception to improve the patients ability to ambulate with normalized pattern and reach overhead    10 min Manual Therapy:  Left sidelying inferior and medial scapular mobilizations with MET; supraspinatus, UT, and LS trigger point release; flexion PROM   Rationale: decrease pain, increase ROM and increase tissue extensibility to facilitate return to PLOF    With   [x] TE   [] TA   [] neuro   [] other: Patient Education: [x] Review HEP    [] Progressed/Changed HEP based on:   [] positioning   [] body mechanics   [] transfers   [] heat/ice application    [] other:      Pain Level (0-10 scale) post treatment: 2    ASSESSMENT/Changes in Function: Pt continues to do well with exercise, progressing without increasing symptoms. Pt remains limited secondary to high levels of pain. Patient will continue to benefit from skilled PT services to modify and progress therapeutic interventions, address functional mobility deficits, address ROM deficits, address strength deficits, analyze and address soft tissue restrictions, analyze and cue movement patterns, analyze and modify body mechanics/ergonomics and assess and modify postural abnormalities to attain remaining goals. [x]  See Plan of Care  []  See progress note/recertification  []  See Discharge Summary         Progress towards goals / Updated goals:  Short Term Goals: To be accomplished in 2 weeks:   1.   Pt will report compliance with initial HEP              eval = established              Current = reports initial compliance  2.   Pt will demonstrate left shoulder AROM flexion to 145 in order to improve ability to perform dressing              Eval = 38              Current = 53  3.   Pt will demonstrate left shoulder AROM functional ER to T2 in order to improve ability to bathe               Eval = unable, supine PROM = 30  4.   Pt will demonstrate left shoulder AROM functional IR to T10 in order to improve ability to don belt and/or undergarments              Eval = unable, supine PROM = 75     Long Term Goals: To be accomplished in 10 weeks: 1.   Pt will score at least 59 on FOTO in order to improve overall function, decrease pain, and facilitate return to PLOF.             Eval = 20  2.   Pt will demonstrate left shoulder strength to at least 4+/5 in order to improve ability to lift objects shoulder height and perform heavy ADLs              Eval = unable secondary to pain  3.   Pt will report max pain 2 in order to dress and perform ADLs at OF.                Eval = 10/10  4.   Pt will demonstrate right hip and knee strength minimum of 5-/5 in order to negotiation stairs at OF              Eval = range 3-5-/10    PLAN  [x]  Upgrade activities as tolerated     [x]  Continue plan of care  []  Update interventions per flow sheet       []  Discharge due to:_  []  Other:_      Scott Fleming, PT 6/7/2019  3:04 PM    Future Appointments   Date Time Provider Dyllan Fishman   6/12/2019  2:00 PM Javon Saxena [facility]   6/14/2019  8:00 AM Ish Elizabeth PT [facility]   6/17/2019  3:10 PM Deonna Fuller NP [facility]   6/19/2019  2:30 PM Ish Elizabeth PT [facility]   6/21/2019  8:30 AM Ish Elizabeth PT [facility]   6/24/2019 10:30 AM Ish Elizabeth PT [facility]   6/26/2019  8:00 AM Ish Elizabeth PT [facility]

## 2019-06-12 ENCOUNTER — HOSPITAL ENCOUNTER (OUTPATIENT)
Dept: PHYSICAL THERAPY | Age: 74
Discharge: HOME | End: 2019-06-12
Payer: MEDICARE

## 2019-06-12 PROCEDURE — 97110 THERAPEUTIC EXERCISES: CPT

## 2019-06-12 PROCEDURE — 97140 MANUAL THERAPY 1/> REGIONS: CPT

## 2019-06-12 PROCEDURE — 97014 ELECTRIC STIMULATION THERAPY: CPT

## 2019-06-12 NOTE — PROGRESS NOTES
PT DAILY TREATMENT NOTE 10-18    Patient Name: Bebeto Horne  Date:2019  : 1945  [x]  Patient  Verified  Payor: Savi Alan / Plan: [address] [place] [address] HMO / Product Type: Managed Care Medicare /    In time:202  Out time:301  Total Treatment Time (min): 61  Visit #: 4 of 10    Medicare/BCBS Only   Total Timed Codes (min):  44 1:1 Treatment Time:  44       Treatment Area: Pain in left shoulder [M25.512]    SUBJECTIVE  Pain Level (0-10 scale): 6  Any medication changes, allergies to medications, adverse drug reactions, diagnosis change, or new procedure performed?: [x] No    [] Yes (see summary sheet for update)  Subjective functional status/changes:   [] No changes reported  Pt reports increasing groin pain, espcially after waking up in the morning, after prolonged sitting and during heel strike while walking. She is achy \"but feeling much better. \"    OBJECTIVE    Modality rationale: decrease edema, decrease inflammation and decrease pain to improve the patients ability to perform ADLs   Min Type Additional Details   15 [x] Estim:  [x]Unatt       [x]IFC  []Premod                        []Other:  [x]w/ice   []w/heat  Position: long sitting  Location: left shoulder     [] Estim: []Att    []TENS instruct  []NMES                    []Other:  []w/US   []w/ice   []w/heat  Position:  Location:    []  Traction: [] Cervical       []Lumbar                       [] Prone          []Supine                       []Intermittent   []Continuous Lbs:  [] before manual  [] after manual    []  Ultrasound: []Continuous   [] Pulsed                           []1MHz   []3MHz W/cm2:  Location:    []  Iontophoresis with dexamethasone         Location: [] Take home patch   [] In clinic    []  Ice     []  heat  []  Ice massage  []  Laser   []  Anodyne Position:  Location:    []  Laser with stim  []  Other:  Position:  Location:    []  Vasopneumatic Device Pressure:       [] lo [] med [] hi   Temperature: [] lo [] med [] hi   [] Skin assessment post-treatment:  [x]intact []redness- no adverse reaction    []redness - adverse reaction:     34 min Therapeutic Exercise:  [x] See flow sheet :   Rationale: increase ROM and increase strength to improve the patients ability to reach UE overhead    10 min Manual Therapy:  Left sidelying inferior and medial scapular mobilizations with MET; supraspinatus, UT, and LS trigger point release; flexion PROM   Rationale: decrease pain, increase ROM and increase tissue extensibility to improve ability to perform ADLs          With   [] TE   [] TA   [] neuro   [] other: Patient Education: [x] Review HEP    [] Progressed/Changed HEP based on:   [] positioning   [] body mechanics   [] transfers   [] heat/ice application    [] other:      Other Objective/Functional Measures: left shoulder AROM: flexion = 60, abduction 70 with shoulder hike     Pain Level (0-10 scale) post treatment: 2-3    ASSESSMENT/Changes in Function: Pt continues to progress exercises and tolerate increased resistance, but she is severely limited secondary to pain. Progression has been slow but consistent. Patient will continue to benefit from skilled PT services to modify and progress therapeutic interventions, address functional mobility deficits, address ROM deficits, address strength deficits, analyze and address soft tissue restrictions, analyze and cue movement patterns, analyze and modify body mechanics/ergonomics and assess and modify postural abnormalities to attain remaining goals. []  See Plan of Care  []  See progress note/recertification  []  See Discharge Summary         Progress towards goals / Updated goals:  Short Term Goals: To be accomplished in 2 weeks:   1.   Pt will report compliance with initial HEP              eval = established              Current = reports initial compliance  2.   Pt will demonstrate left shoulder AROM flexion to 145 in order to improve ability to perform dressing              Eval = 38              Current = 60  3.   Pt will demonstrate left shoulder AROM functional ER to T2 in order to improve ability to bathe               Eval = unable, supine PROM = 30  4.   Pt will demonstrate left shoulder AROM functional IR to T10 in order to improve ability to don belt and/or undergarments              Eval = unable, supine PROM = 75     Long Term Goals: To be accomplished in 10 weeks: 1.   Pt will score at least 59 on FOTO in order to improve overall function, decrease pain, and facilitate return to PLOF.             Eval = 20  2.   Pt will demonstrate left shoulder strength to at least 4+/5 in order to improve ability to lift objects shoulder height and perform heavy ADLs              Eval = unable secondary to pain  3.   Pt will report max pain 2 in order to dress and perform ADLs at OF.                Eval = 10/10  4.   Pt will demonstrate right hip and knee strength minimum of 5-/5 in order to negotiation stairs at PLOF              Eval = range 3-5-/10    PLAN  [x]  Upgrade activities as tolerated     [x]  Continue plan of care  []  Update interventions per flow sheet       []  Discharge due to:_  []  Other:_      Ty Edge, ADRI[facility] 6/12/2019  2:12 PM    Future Appointments   Date Time Provider Dyllan Fishman   6/14/2019  8:00 AM ADRIENNE Stahl [facility]   6/17/2019  3:10 PM Katie Bain NP [facility]   6/19/2019  2:30 PM ADRIENNE Stahl [facility]   6/21/2019  8:30 AM ADRIENNE Stahl [facility]   6/24/2019 10:30 AM ADRIENNE Stahl [facility]   6/26/2019  8:00 AM ADRIENNE Stahl [facility]

## 2019-06-14 ENCOUNTER — HOSPITAL ENCOUNTER (OUTPATIENT)
Dept: PHYSICAL THERAPY | Age: 74
Discharge: HOME | End: 2019-06-14
Payer: MEDICARE

## 2019-06-14 PROCEDURE — 97110 THERAPEUTIC EXERCISES: CPT

## 2019-06-14 PROCEDURE — 97014 ELECTRIC STIMULATION THERAPY: CPT

## 2019-06-14 PROCEDURE — 97140 MANUAL THERAPY 1/> REGIONS: CPT

## 2019-06-14 NOTE — PROGRESS NOTES
PT DAILY TREATMENT NOTE 10-18    Patient Name: Lalo Dutton  Date:2019  : 1945  [x]  Patient  Verified  Payor: Walt Fong / Plan: [address] [place] [address] HMO / Product Type: Managed Care Medicare /    In time:802  Out time:901  Total Treatment Time (min): 61  Visit #: 5 of 10    Medicare/BCBS Only   Total Timed Codes (min):  44 1:1 Treatment Time:  44       Treatment Area: Pain in left shoulder [M25.512]    SUBJECTIVE  Pain Level (0-10 scale): 2-3  Any medication changes, allergies to medications, adverse drug reactions, diagnosis change, or new procedure performed?: [x] No    [] Yes (see summary sheet for update)  Subjective functional status/changes:   [] No changes reported  \"Today is a good day. My pain is coming down and when I am at rest I do not have any pain. My groin is giving me the most trouble right now. \"    OBJECTIVE    Modality rationale: decrease inflammation, decrease pain and increase tissue extensibility to improve the patients ability to improve ability to sleep   Min Type Additional Details   15 [x] Estim:  [x]Unatt       [x]IFC  []Premod                        []Other:  [x]w/ice   []w/heat  Position: long sitting   Location:left shoulder     [] Estim: []Att    []TENS instruct  []NMES                    []Other:  []w/US   []w/ice   []w/heat  Position:  Location:    []  Traction: [] Cervical       []Lumbar                       [] Prone          []Supine                       []Intermittent   []Continuous Lbs:  [] before manual  [] after manual    []  Ultrasound: []Continuous   [] Pulsed                           []1MHz   []3MHz W/cm2:  Location:    []  Iontophoresis with dexamethasone         Location: [] Take home patch   [] In clinic    []  Ice     []  heat  []  Ice massage  []  Laser   []  Anodyne Position:  Location:    []  Laser with stim  []  Other:  Position:  Location:    []  Vasopneumatic Device Pressure:       [] lo [] med [] hi   Temperature: [] lo [] med [] hi   [] Skin assessment post-treatment:  [x]intact []redness- no adverse reaction    []redness - adverse reaction:     36 min Therapeutic Exercise:  [x] See flow sheet :   Rationale: increase ROM, increase strength and increase proprioception to improve the patients ability to perform ADLs     8 min Manual Therapy:  Inferior and medial left scapular mobilizations and MET; soft tissue mobilization to posterior RTC   Rationale: decrease pain, increase ROM and increase tissue extensibility to improve ability to lift objects    With   [] TE   [] TA   [] neuro   [] other: Patient Education: [x] Review HEP    [] Progressed/Changed HEP based on:   [] positioning   [] body mechanics   [] transfers   [] heat/ice application    [] other:      Other Objective/Functional Measures: sidelying AROM left shoulder flexion = 110; AROM left shoulder ER= to eat, IR= L2; hip scour positive for pain    Pain Level (0-10 scale) post treatment: 2    ASSESSMENT/Changes in Function: Pt is making steady progress with shoulder AROM and reducing pain levels. Pt continues to be the most challenged into ER. Pt hip remains painful with no current measurable functional improvements - will continue to monitor pt symptoms    Patient will continue to benefit from skilled PT services to modify and progress therapeutic interventions, address functional mobility deficits, address ROM deficits, address strength deficits, analyze and address soft tissue restrictions, analyze and cue movement patterns, analyze and modify body mechanics/ergonomics, assess and modify postural abnormalities and balance to attain remaining goals. []  See Plan of Care  []  See progress note/recertification  []  See Discharge Summary         Progress towards goals / Updated goals:  Short Term Goals: To be accomplished in 2 weeks:   1.   Pt will report compliance with initial HEP              eval = established              MET  2.   Pt will demonstrate left shoulder AROM flexion to 145 in order to improve ability to perform dressing              Eval = 38              Current = sidelying to 110  3.   Pt will demonstrate left shoulder AROM functional ER to T2 in order to improve ability to bathe               Eval = unable, supine PROM = 30   Current = fingers to ear  4.   Pt will demonstrate left shoulder AROM functional IR to T10 in order to improve ability to don belt and/or undergarments              Eval = unable, supine PROM = 75    Current = L2    Long Term Goals: To be accomplished in 10 weeks: 1.   Pt will score at least 59 on FOTO in order to improve overall function, decrease pain, and facilitate return to PLOF.             Eval = 20   Current = to be assessed at PN  2.   Pt will demonstrate left shoulder strength to at least 4+/5 in order to improve ability to lift objects shoulder height and perform heavy ADLs              Eval = unable secondary to pain   Current = unable due to pain levels, progressing towards 3/5  3.   Pt will report max pain 2 in order to dress and perform ADLs at PLOF.                Eval = 10/10   Current = ranges 2-6  4.   Pt will demonstrate right hip and knee strength minimum of 5-/5 in order to negotiation stairs at PLOF              Eval = range 3-5-/10   Progressing    PLAN  [x]  Upgrade activities as tolerated     [x]  Continue plan of care  []  Update interventions per flow sheet       []  Discharge due to:_  []  Other:_      Denise Romero, PT 6/14/2019  8:00 AM    Future Appointments   Date Time Provider Dyllan Marinellii   6/17/2019  3:10 PM Osiel Thacker NP [address] [facility]   6/19/2019  2:30 PM [facility]   6/21/2019  8:30 AM ADRIENNE Ludwig [facility]   6/24/2019 10:30 AM ADRIENNE Ludwig [facility]   6/26/2019  8:00 AM ADRIENNE LudwigHS [facility]

## 2019-06-17 ENCOUNTER — HOSPITAL ENCOUNTER (OUTPATIENT)
Dept: LAB | Age: 74
Discharge: HOME | End: 2019-06-17
Payer: MEDICARE

## 2019-06-17 ENCOUNTER — OFFICE VISIT (OUTPATIENT)
Dept: HEMATOLOGY | Age: 74
End: 2019-06-17

## 2019-06-17 VITALS
OXYGEN SATURATION: 98 % | SYSTOLIC BLOOD PRESSURE: 134 MMHG | BODY MASS INDEX: 26.21 KG/M2 | DIASTOLIC BLOOD PRESSURE: 65 MMHG | TEMPERATURE: 98.3 F | WEIGHT: 167 LBS | HEART RATE: 76 BPM | HEIGHT: 67 IN

## 2019-06-17 DIAGNOSIS — K75.4 AUTOIMMUNE HEPATITIS (HCC): ICD-10-CM

## 2019-06-17 DIAGNOSIS — K75.4 AUTOIMMUNE HEPATITIS (HCC): Primary | ICD-10-CM

## 2019-06-17 LAB
ALBUMIN SERPL-MCNC: 3.8 G/DL (ref 3.4–5)
ALBUMIN/GLOB SERPL: 0.9 {RATIO} (ref 0.8–1.7)
ALP SERPL-CCNC: 91 U/L (ref 45–117)
ALT SERPL-CCNC: 18 U/L (ref 13–56)
ANION GAP SERPL CALC-SCNC: 4 MMOL/L (ref 3–18)
AST SERPL-CCNC: 15 U/L (ref 15–37)
BASOPHILS # BLD: 0 K/UL (ref 0–0.1)
BASOPHILS NFR BLD: 0 % (ref 0–2)
BILIRUB DIRECT SERPL-MCNC: 0.1 MG/DL (ref 0–0.2)
BILIRUB SERPL-MCNC: 0.4 MG/DL (ref 0.2–1)
BUN SERPL-MCNC: 16 MG/DL (ref 7–18)
BUN/CREAT SERPL: 17 (ref 12–20)
CALCIUM SERPL-MCNC: 9.6 MG/DL (ref 8.5–10.1)
CHLORIDE SERPL-SCNC: 104 MMOL/L (ref 100–108)
CO2 SERPL-SCNC: 31 MMOL/L (ref 21–32)
CREAT SERPL-MCNC: 0.95 MG/DL (ref 0.6–1.3)
DIFFERENTIAL METHOD BLD: ABNORMAL
EOSINOPHIL # BLD: 0.1 K/UL (ref 0–0.4)
EOSINOPHIL NFR BLD: 2 % (ref 0–5)
ERYTHROCYTE [DISTWIDTH] IN BLOOD BY AUTOMATED COUNT: 14.7 % (ref 11.6–14.5)
GLOBULIN SER CALC-MCNC: 4.3 G/DL (ref 2–4)
GLUCOSE SERPL-MCNC: 98 MG/DL (ref 74–99)
HCT VFR BLD AUTO: 40.1 % (ref 35–45)
HGB BLD-MCNC: 12.5 G/DL (ref 12–16)
LYMPHOCYTES # BLD: 1.6 K/UL (ref 0.9–3.6)
LYMPHOCYTES NFR BLD: 28 % (ref 21–52)
MCH RBC QN AUTO: 26.6 PG (ref 24–34)
MCHC RBC AUTO-ENTMCNC: 31.2 G/DL (ref 31–37)
MCV RBC AUTO: 85.3 FL (ref 74–97)
MONOCYTES # BLD: 0.6 K/UL (ref 0.05–1.2)
MONOCYTES NFR BLD: 10 % (ref 3–10)
NEUTS SEG # BLD: 3.4 K/UL (ref 1.8–8)
NEUTS SEG NFR BLD: 60 % (ref 40–73)
PLATELET # BLD AUTO: 239 K/UL (ref 135–420)
PMV BLD AUTO: 10.4 FL (ref 9.2–11.8)
POTASSIUM SERPL-SCNC: 4.7 MMOL/L (ref 3.5–5.5)
PROT SERPL-MCNC: 8.1 G/DL (ref 6.4–8.2)
RBC # BLD AUTO: 4.7 M/UL (ref 4.2–5.3)
SODIUM SERPL-SCNC: 139 MMOL/L (ref 136–145)
WBC # BLD AUTO: 5.7 K/UL (ref 4.6–13.2)

## 2019-06-17 PROCEDURE — 80076 HEPATIC FUNCTION PANEL: CPT

## 2019-06-17 PROCEDURE — 85025 COMPLETE CBC W/AUTO DIFF WBC: CPT

## 2019-06-17 PROCEDURE — 80048 BASIC METABOLIC PNL TOTAL CA: CPT

## 2019-06-17 PROCEDURE — 36415 COLL VENOUS BLD VENIPUNCTURE: CPT

## 2019-06-17 NOTE — PROGRESS NOTES
[facility], MD, Arlyss Knows, Lorenzo Better, MD Hazle Seip, PA-C Lori Parrot, ACN-BC     Sandi Costa, [facility]-BC   ABRAHAM Burgos NP [facility] 136    at [address], [facility] Yanet Padron  22.    [phone]    FAX: [address] [place] [address], [address]    [phone]    FAX: [phone]     Patient Care Team:  Jen Terry MD as PCP - General (Internal Medicine)      Problem List  Date Reviewed: 5/16/2019          Codes Class Noted    Autoimmune hepatitis [facility]) ICD-10-CM: K75.4  ICD-9-CM: 571.42  6/28/2017        Hypertension ICD-10-CM: I10  ICD-9-CM: 401.9  6/28/2017              Ciro Rivers returns to the [address] for management of autoimmune hepatitis. The active problem list, all pertinent past medical history, medications, liver histology, radiologic findings, and laboratory findings related to the liver disorder were reviewed with the patient. The patient is a 68 y.o. Black female who was first noted to have abnormalities in liver transaminases in 2012 and then developed more profound elevation in liver enzymes and jaundice while she was visiting family in [facility]. She was hospitalized and transferred to [facility]. A liver biopsy was performed. The findings were consistent with severe AIH and she was treated with high dose prednisone and cellcept. Prednisone was eventually tapered off. She remains on low dose cellcept. The most recent imaging done was ultrasound 12/2018. The results demonstrated nonspecific mild increased hepatic echotexture.      Assessment of liver fibrosis was performed with sheer wave elastography at [facility] in 2018. The result was 3.85 kPa which correlates with no fibrosis. The most recent laboratory studies indicate that the liver transaminases are normal, ALP is normal, tests of hepatic synthetic and metabolic function are normal, the platelet count is normal.     The patient has no symptoms which could be attributed to the liver disorder. The patient has not experienced fatigue, pain in the right side over the liver. The patient completes all daily activities without any functional limitations. ALLERGIES  No Known Allergies    MEDICATIONS  Current Outpatient Medications   Medication Sig    mycophenolate mofetil (CELLCEPT) 250 mg capsule Take 1 Cap by mouth two (2) times a day.  calcium-cholecalciferol, d3, (CALCIUM 600 + D) 600-125 mg-unit tab Take  by mouth.  diclofenac (VOLTAREN) 1 % gel Apply 4 g to affected area two (2) times a day. Apply to affected area as directed.  hydroCHLOROthiazide (HYDRODIURIL) 12.5 mg tablet Take 12.5 mg by mouth daily. No current facility-administered medications for this visit. SYSTEM REVIEW NOT RELATED TO LIVER DISEASE OR REVIEWED ABOVE:  Constitution systems: Negative for fever, chills, weight gain, weight loss. Eyes: Negative for visual changes. ENT: Negative for sore throat, painful swallowing. Respiratory: Negative for cough, hemoptysis, SOB. Cardiology: Negative for chest pain, palpitations. GI:  Negative for constipation or diarrhea. : Negative for urinary frequency, dysuria, hematuria, nocturia. Skin: Negative for rash. Hematology: Negative for easy bruising, blood clots. Musculo-skelatal: Negative for back pain, muscle pain, weakness. Neurologic: Negative for headaches, dizziness, vertigo, memory problems not related to HE. Psychology: Negative for anxiety, depression. FAMILY HISTORY:  The father  of COPD. The mother  of breast cancer. There is no family history of liver disease. There is no family history of immune disorders. SOCIAL HISTORY:  The patient is . The patient has 1 child. The patient has never used tobacco products. The patient has never consumed significant amounts of alcohol. The patient used to work as a . The patient retired in 2014. PHYSICAL EXAMINATION:  Visit Vitals  /65 (BP 1 Location: Right arm, BP Patient Position: Sitting)   Pulse 76   Temp 98.3 °F (36.8 °C)   Ht 5' 7\" (1.702 m)   Wt 167 lb (75.8 kg)   SpO2 98%   BMI 26.16 kg/m²     General: No acute distress. Eyes: Sclera anicteric. ENT: No oral lesions. Thyroid normal.  Nodes: No adenopathy. Skin: No spider angiomata. No jaundice. No palmar erythema. Respiratory: Lungs clear to auscultation. Cardiovascular: Regular heart rate. No murmurs. No JVD. Abdomen: Soft non-tender. Liver size normal to percussion/palpation. Spleen not palpable. No obvious ascites. Extremities: No edema. No muscle wasting. No gross arthritic changes. Neurologic: Alert and oriented. Cranial nerves grossly intact. No asterixsis. LABORATORY STUDIES:  Liver [place] [address] Units 6/17/2019 11/14/2018   WBC 4.6 - 13.2 K/uL 5.7 5.4   ANC 1.8 - 8.0 K/UL 3.4 3.1   HGB 12.0 - 16.0 g/dL 12.5 12.8    - 420 K/uL 239 256   AST 15 - 37 U/L 15 20   ALT 13 - 56 U/L 18 15   Alk Phos 45 - 117 U/L 91 88   Bili, Total 0.2 - 1.0 MG/DL 0.4 0.5   Bili, Direct 0.0 - 0.2 MG/DL 0.1 0.13   Albumin 3.4 - 5.0 g/dL 3.8 4.2   BUN 7.0 - 18 MG/DL 16 12   Creat 0.6 - 1.3 MG/DL 0.95 0.75   Na 136 - 145 mmol/L 139 142   K 3.5 - 5.5 mmol/L 4.7 4.4   Cl 100 - 108 mmol/L 104 102   CO2 21 - 32 mmol/L 31 26   Glucose 74 - 99 mg/dL 98 86     SEROLOGIES:  Serologies Latest Ref Rng & Units 10/3/2017 10/3/2017 6/28/2017   LEONILA, IFA  Positive (A) WILL FOLLOW Negative   LEONILA Pattern   1:160 (H)    ASMCA 0 - 19 Units 27 (H) WILL FOLLOW 31 (H)     LIVER HISTOLOGY:  6/2012.   Liver biopsy at [facility]. Severe hepatitis with cholestasis consistent with AIH.    1/2017. Fiboscan at [facility]. Consistent with F0.  5/2018. Sheer wave elastography performed at [facility]. EkPa was E Range: 3.26-4.76 kPa, E Mean: 3.89 kPa, E Median: 3.85 kPa, E Std: 0.44 kPa. The results suggested a fibrosis level of F0.  12/2018. Shear wave elastography performed at [facility]. EkPa was Range: 3.34- 4.35 kPa, E Mean: 3.85 kPa, E Median: 3.68 kPa. E Std: 0.39 kPa. The results suggested a fibrosis level of F0. ENDOSCOPIC PROCEDURES:  Not available or performed    RADIOLOGY:  2/2012. Ultrasound of liver. Echogenic consistent with chronic liver disease. No liver mass lesions. No dilated bile ducts. No ascites. 5/2016. Ultrasound of liver. Echogenic consistent with chronic liver disease. No liver mass lesions. No dilated bile ducts. No ascites. 5/2018. Ultrasound of liver. Echogenic consistent with chronic liver disease. No liver mass lesions. No dilated bile ducts. No ascites. 12/2018. Ultrasound of liver. Echogenic consistent with chronic liver disease. No liver mass lesions. No dilated bile ducts. No ascites. OTHER TESTING:  Not available or performed    ASSESSMENT AND PLAN:  Autoimmune Hepatitis by liver biopsy in 2012. Liver transaminases are now normal.  Alkaline phosphate is normal.  Liver function is normal.  The platelet count is normal.      Based upon laboratory studies and imaging the patient does not appear to have significant liver injury. The patient is receiving treatment with Cellcept. The patient is responding to and is tolerating the treatment without significant side effects. Have performed laboratory testing to monitor liver function and degree of liver injury. This included BMP, hepatic panel, CBC with platelet count. There is no reason to perform liver biopsy at this time. The need to assess liver fibrosis was discussed.   Sheer wave elastography can assess liver fibrosis and determine if a patient has advanced fibrosis or cirrhosis without the need for liver biopsy. The elastography can be repeated annually or as often as clinically indicated to assess for fibrosis progression and/or regression. The patient was directed to continue all current medications at the current dosages. There are no contraindications for the patient to take any medications that are necessary for treatment of other medical issues. The patient was counseled regarding alcohol consumption. The need for vaccination against viral hepatitis A and B will be assessed with serologic and instituted as appropriate. All of the above issues were discussed with the patient. All questions were answered. The patient expressed a clear understanding of the above. [facility] 87 in 6 months to monitor.       Maribell Puente, AGPCNP-BC  [address] [place] [address] Parris Wilburn, [address]  [phone]

## 2019-06-19 ENCOUNTER — HOSPITAL ENCOUNTER (OUTPATIENT)
Dept: PHYSICAL THERAPY | Age: 74
Discharge: HOME | End: 2019-06-19
Payer: MEDICARE

## 2019-06-19 PROCEDURE — 97014 ELECTRIC STIMULATION THERAPY: CPT

## 2019-06-19 PROCEDURE — 97110 THERAPEUTIC EXERCISES: CPT

## 2019-06-21 ENCOUNTER — HOSPITAL ENCOUNTER (OUTPATIENT)
Dept: PHYSICAL THERAPY | Age: 74
Discharge: HOME | End: 2019-06-21
Payer: MEDICARE

## 2019-06-21 PROCEDURE — 97110 THERAPEUTIC EXERCISES: CPT

## 2019-06-21 PROCEDURE — 97014 ELECTRIC STIMULATION THERAPY: CPT

## 2019-06-21 NOTE — PROGRESS NOTES
PT DAILY TREATMENT NOTE 10-18    Patient Name: Brian Cunningham  Date:2019  : 1945  [x]  Patient  Verified  Payor: Fernando Clipper / Plan: [address] [place] [address] HMO / Product Type: Managed Care Medicare /    In time:832  Out time:920  Total Treatment Time (min): 48  Visit #: 6 of 10    Medicare/BCBS Only   Total Timed Codes (min):  30 1:1 Treatment Time:  30       Treatment Area: Pain in left shoulder [M25.512]    SUBJECTIVE  Pain Level (0-10 scale): 1  Any medication changes, allergies to medications, adverse drug reactions, diagnosis change, or new procedure performed?: [x] No    [] Yes (see summary sheet for update)  Subjective functional status/changes:   [] No changes reported  \"I'm getting better\"    OBJECTIVE    Modality rationale: decrease edema, decrease inflammation and decrease pain to improve the patients ability to perform ADLs   Min Type Additional Details   15 [x] Estim:  [x]Unatt       [x]IFC  []Premod                        []Other:  [x]w/ice   []w/heat  Position: left shoulder  Location:right sidelying    [] Estim: []Att    []TENS instruct  []NMES                    []Other:  []w/US   []w/ice   []w/heat  Position:  Location:    []  Traction: [] Cervical       []Lumbar                       [] Prone          []Supine                       []Intermittent   []Continuous Lbs:  [] before manual  [] after manual    []  Ultrasound: []Continuous   [] Pulsed                           []1MHz   []3MHz W/cm2:  Location:    []  Iontophoresis with dexamethasone         Location: [] Take home patch   [] In clinic    []  Ice     []  heat  []  Ice massage  []  Laser   []  Anodyne Position:  Location:    []  Laser with stim  []  Other:  Position:  Location:    []  Vasopneumatic Device Pressure:       [] lo [] med [] hi   Temperature: [] lo [] med [] hi   [] Skin assessment post-treatment:  [x]intact []redness- no adverse reaction    []redness - adverse reaction:     30 min Therapeutic Exercise: [x] See flow sheet :   Rationale: increase ROM, increase strength and increase proprioception to improve the patients ability to reach overhead          With   [] TE   [] TA   [] neuro   [] other: Patient Education: [x] Review HEP    [] Progressed/Changed HEP based on:   [] positioning   [] body mechanics   [] transfers   [] heat/ice application    [] other:         Pain Level (0-10 scale) post treatment: 0    ASSESSMENT/Changes in Function: Discussed with pt need to follow up with MD due to continued significant deficits and impaired function. Pt verbalized understanding and stated she would make an aappointment today. Pt pain levels and AROM are improving, however, pt would likely benefit from follow up evaluation    Patient will continue to benefit from skilled PT services to modify and progress therapeutic interventions, address functional mobility deficits, address ROM deficits, address strength deficits, analyze and address soft tissue restrictions, analyze and cue movement patterns, analyze and modify body mechanics/ergonomics, assess and modify postural abnormalities and instruct in home and community integration to attain remaining goals. []  See Plan of Care  []  See progress note/recertification  []  See Discharge Summary         Progress towards goals / Updated goals:   1.   Pt will report compliance with initial HEP              eval = established              MET  2.   Pt will demonstrate left shoulder AROM flexion to 145 in order to improve ability to perform dressing              Eval = 38              Current = supine to 125  3.   Pt will demonstrate left shoulder AROM functional ER to T2 in order to improve ability to bathe               Eval = unable, supine PROM = 30              Current = fingers to ear  4.   Pt will demonstrate left shoulder AROM functional IR to T10 in order to improve ability to don belt and/or undergarments              Eval = unable, supine PROM = 75              Current = L2     Long Term Goals: To be accomplished in 10 weeks: 1.   Pt will score at least 59 on FOTO in order to improve overall function, decrease pain, and facilitate return to PLOF.             Eval = 20              Current = to be assessed at PN  2.   Pt will demonstrate left shoulder strength to at least 4+/5 in order to improve ability to lift objects shoulder height and perform heavy ADLs              Eval = unable secondary to pain              Current = unable due to pain levels, progressing towards 3/5  3.   Pt will report max pain 2 in order to dress and perform ADLs at PLOF.                Eval = 10/10              Current = ranges 2-6  4.   Pt will demonstrate right hip and knee strength minimum of 5-/5 in order to negotiation stairs at PLOF              Eval = range 3-5-/10              Progressing    PLAN  []  Upgrade activities as tolerated     [x]  Continue plan of care  []  Update interventions per flow sheet       []  Discharge due to:_  []  Other:_      Carissa Aragon PT 6/21/2019  8:35 AM    Future Appointments   Date Time Provider Dyllan Fishman   6/24/2019 10:30 AM [facility]   6/26/2019  8:00 AM [facility]   12/17/2019 11:40 AM Robin Henriquez NP [facility]

## 2019-06-24 ENCOUNTER — OFFICE VISIT (OUTPATIENT)
Dept: ORTHOPEDIC SURGERY | Facility: CLINIC | Age: 74
End: 2019-06-24

## 2019-06-24 ENCOUNTER — HOSPITAL ENCOUNTER (OUTPATIENT)
Dept: PHYSICAL THERAPY | Age: 74
Discharge: HOME | End: 2019-06-24
Payer: MEDICARE

## 2019-06-24 VITALS
HEART RATE: 97 BPM | SYSTOLIC BLOOD PRESSURE: 128 MMHG | BODY MASS INDEX: 25.58 KG/M2 | DIASTOLIC BLOOD PRESSURE: 70 MMHG | RESPIRATION RATE: 16 BRPM | HEIGHT: 67 IN | OXYGEN SATURATION: 98 % | TEMPERATURE: 97.5 F | WEIGHT: 163 LBS

## 2019-06-24 DIAGNOSIS — M75.82 TENDONITIS OF LEFT ROTATOR CUFF: Primary | ICD-10-CM

## 2019-06-24 DIAGNOSIS — S46.212A BICEPS RUPTURE, PROXIMAL, LEFT, INITIAL ENCOUNTER: ICD-10-CM

## 2019-06-24 DIAGNOSIS — S40.022D CONTUSION OF MULTIPLE SITES OF LEFT UPPER EXTREMITY, SUBSEQUENT ENCOUNTER: ICD-10-CM
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
## 2019-06-24 DIAGNOSIS — M24.812 INTERNAL DERANGEMENT OF LEFT SHOULDER: ICD-10-CM

## 2019-06-24 PROCEDURE — 97110 THERAPEUTIC EXERCISES: CPT

## 2019-06-24 PROCEDURE — 97530 THERAPEUTIC ACTIVITIES: CPT

## 2019-06-24 NOTE — PROGRESS NOTES
HISTORY:  Afshan Hoover returns. She is status post fall on her left upper extremity. She is in therapy currently and seems to be doing poorly with regard to her motion. PHYSICAL EXAMINATION:  She has moderate-to-severe pain to the left shoulder when attempting to adduct her left arm. She can barely raise to 60 degrees and hold. There is a markedly positive empty can sign. The external rotation is barely 5 degrees and internal barely 2 degrees with pain in both planes of motion passively. The skin has healed nicely associated with the left shoulder abrasion. PLAN:  In light of her current findings with poor motion and status post fall with her physical therapy being essentially unremarkable in her recoveries to also be noting causing pain, I am going to order an MRI to assist the internal structures of the left shoulder on a high suspicion of internal derangement. She is going to hold off on therapy until we get the MRI. Today, all of her questions answered to her satisfaction.

## 2019-06-24 NOTE — PROGRESS NOTES
PT DAILY TREATMENT NOTE 10-18    Patient Name: Mary Ann Olson  Date:2019  : 1945  [x]  Patient  Verified  Payor: Nathen Floyd / Plan: [address] [place] [address] HMO / Product Type: Managed Care Medicare /    In LLJM:  Out time:112  Total Treatment Time (min): 51  Visit #: 8 of 10    Medicare/BCBS Only   Total Timed Codes (min):  36 1:1 Treatment Time:  32       Treatment Area: Pain in left shoulder [M25.512]    SUBJECTIVE  Pain Level (0-10 scale): 2  Any medication changes, allergies to medications, adverse drug reactions, diagnosis change, or new procedure performed?: [x] No    [] Yes (see summary sheet for update)  Subjective functional status/changes:   [] No changes reported  \" I was testing my limits this weekend. I did a little gardening, lifting flower pots, vacuuming, and moving small pieces of furniture. \" Max pain in the last week = 7/10    OBJECTIVE    Modality rationale: decrease edema, decrease inflammation and decrease pain to improve the patients ability to perform ADLs   Min Type Additional Details   15 [x] Estim:  [x]Unatt       [x]IFC  []Premod                        []Other:  [x]w/ice   []w/heat  Position:long sitting   Location:left shoulder    [] Estim: []Att    []TENS instruct  []NMES                    []Other:  []w/US   []w/ice   []w/heat  Position:  Location:    []  Traction: [] Cervical       []Lumbar                       [] Prone          []Supine                       []Intermittent   []Continuous Lbs:  [] before manual  [] after manual    []  Ultrasound: []Continuous   [] Pulsed                           []1MHz   []3MHz W/cm2:  Location:    []  Iontophoresis with dexamethasone         Location: [] Take home patch   [] In clinic    []  Ice     []  heat  []  Ice massage  []  Laser   []  Anodyne Position:  Location:    []  Laser with stim  []  Other:  Position:  Location:    []  Vasopneumatic Device Pressure:       [] lo [] med [] hi   Temperature: [] lo [] med [] hi   [] Skin assessment post-treatment:  [x]intact []redness- no adverse reaction    []redness - adverse reaction:     22 min Therapeutic Exercise:  [x] See flow sheet :   Rationale: increase ROM and increase strength to improve the patients ability to reach overhead    10 min Therapeutic Activity:  [x]  See flow sheet :   Rationale: increase strength, improve balance and increase proprioception  to improve the patients ability to transfer and ambulate community distances           With   [] TE   [] TA   [] neuro   [] other: Patient Education: [x] Review HEP    [] Progressed/Changed HEP based on:   [] positioning   [] body mechanics   [] transfers   [] heat/ice application    [] other:      Other Objective/Functional Measures: functional ER = occipital protuberance, function IR = T10      Pain Level (0-10 scale) post treatment: 0    ASSESSMENT/Changes in Function: Pt has a follow up with MD today. She continues to have significant deficits with AROM and pain suggesting RTC involvement that would benefit from further diagnostics. She also continues to have left groin pain that has not had significant changes with no specific alleviating factors. Patient will continue to benefit from skilled PT services to modify and progress therapeutic interventions, address functional mobility deficits, address ROM deficits, address strength deficits, analyze and address soft tissue restrictions, analyze and cue movement patterns, analyze and modify body mechanics/ergonomics and assess and modify postural abnormalities to attain remaining goals. []  See Plan of Care  []  See progress note/recertification  []  See Discharge Summary         Progress towards goals / Updated goals:   1.   Pt will report compliance with initial HEP              eval = established              MET  2.   Pt will demonstrate left shoulder AROM flexion to 145 in order to improve ability to perform dressing              Eval = 38              Current = supine to 125  3.   Pt will demonstrate left shoulder AROM functional ER to T2 in order to improve ability to bathe               Eval = unable, supine PROM = 30              Current = fingers to ear  4.   Pt will demonstrate left shoulder AROM functional IR to T10 in order to improve ability to don belt and/or undergarments              Eval = unable, supine PROM = 75              Current = L2     Long Term Goals: To be accomplished in 10 weeks: 1.   Pt will score at least 59 on FOTO in order to improve overall function, decrease pain, and facilitate return to PLOF.             Eval = 20              Current = to be assessed at PN  2.   Pt will demonstrate left shoulder strength to at least 4+/5 in order to improve ability to lift objects shoulder height and perform heavy ADLs              Eval = unable secondary to pain              Current = unable due to pain levels, progressing towards 3/5  3.   Pt will report max pain 2 in order to dress and perform ADLs at PLOF.                Eval = 10/10              Current = max pain in last week was 7/10  4.   Pt will demonstrate right hip and knee strength minimum of 5-/5 in order to negotiation stairs at PLOF              Eval = range 3-5-/10              Progressing, STS improving with control and no UE use        PLAN  []  Upgrade activities as tolerated     [x]  Continue plan of care  []  Update interventions per flow sheet       []  Discharge due to:_  []  Other:_      Maya Kerr, PT 6/24/2019  10:41 AM    Future Appointments   Date Time Provider Dyllan Fishman   6/24/2019  1:45 PM Dave Adams PA-C [facility]   6/26/2019 10:30 AM [facility]   12/17/2019 11:40 AM Stuart Saeed, ABRAHAM [facility]

## 2019-06-26 ENCOUNTER — HOSPITAL ENCOUNTER (OUTPATIENT)
Dept: PHYSICAL THERAPY | Age: 74
End: 2019-06-26
Payer: MEDICARE

## 2019-06-30 RX ORDER — MYCOPHENOLATE MOFETIL 250 MG/1
CAPSULE ORAL
Qty: 180 CAP | Refills: 3 | Status: SHIPPED | OUTPATIENT
Start: 2019-06-30 | End: 2020-07-06

## 2019-07-06 ENCOUNTER — HOSPITAL ENCOUNTER (OUTPATIENT)
Age: 74
Discharge: HOME | End: 2019-07-06
Attending: PHYSICIAN ASSISTANT
Payer: MEDICARE

## 2019-07-06 DIAGNOSIS — S46.212A BICEPS RUPTURE, PROXIMAL, LEFT, INITIAL ENCOUNTER: ICD-10-CM

## 2019-07-06 DIAGNOSIS — M24.812 INTERNAL DERANGEMENT OF LEFT SHOULDER: ICD-10-CM

## 2019-07-06 DIAGNOSIS — M75.82 TENDONITIS OF LEFT ROTATOR CUFF: ICD-10-CM

## 2019-07-06 PROCEDURE — 73221 MRI JOINT UPR EXTREM W/O DYE: CPT

## 2019-07-08 ENCOUNTER — OFFICE VISIT (OUTPATIENT)
Dept: ORTHOPEDIC SURGERY | Facility: CLINIC | Age: 74
End: 2019-07-08

## 2019-07-08 VITALS
WEIGHT: 161.2 LBS | HEART RATE: 74 BPM | TEMPERATURE: 98.2 F | RESPIRATION RATE: 18 BRPM | OXYGEN SATURATION: 98 % | HEIGHT: 67 IN | DIASTOLIC BLOOD PRESSURE: 84 MMHG | BODY MASS INDEX: 25.3 KG/M2 | SYSTOLIC BLOOD PRESSURE: 145 MMHG

## 2019-07-08 DIAGNOSIS — M75.82 TENDONITIS OF LEFT ROTATOR CUFF: Primary | ICD-10-CM

## 2019-07-08 NOTE — PROGRESS NOTES
HISTORY OF PRESENT ILLNESS:  Mayank Wallace returns for MRI review of the left shoulder. She had stopped outpatient physical therapy until the MRI was completed. Overall, though, her range of motion status post her fall is improved. She still has pain, however. Please see below for the MRI findings attached. PLAN:   We did discuss the possibility within the next three to six weeks if she persists with poor range of motion and/or poor pain control, a recommendation to speak to Dr. Arsalan Casey of [address] [place], [place] [address] considering the internal derangement noted on the MRI to consider possible arthroscopy to repair versus a total shoulder replacement. Today, all her questions were answered to her satisfaction. A copy of her MRI was reviewed and provided. EXAM:  MR Left Shoulder without Contrast.     INDICATION:  Left shoulder pain, s/p fall in May 2019. and decreased range of  motion. Rotator cuff impingement or toney tear suspected. Tendonitis of left  rotator cuff (M75.82). Biceps rupture, proximal, left, initial encounter  (R73.741P). Internal derangement of left shoulder (M24.812)     COMPARISON:  None     TECHNIQUE:  Multisequence, multiplanar MR imaging performed through the left  shoulder utilizing the following sequences. Sagittal T1, fat-sat propeller T2,  coronal T1, fat-sat propeller T2, axial 3D-MERGE and sagittal fat-sat propeller  T2.     FINDINGS:            Bones, Joints, Soft Tissues:      - No AVN. No occult fracture. - Focal fairly prominent subcortical cysts in the inferior aspect of the  glenoid. - Mild degenerative hypertrophy involving the acromioclavicular joint.  - Small marginal osteophytes around the humeral head.     Rotator Cuff:     - Supraspinatus:  Complete or near complete tear of the supraspinatus tendon. There may be tiny slip of supraspinatus tendon still attached to the anterior  tip of the greater tuberosity region.   For the majority of the tendon, there is  significant retraction of the tendon, with the retracted tip of the tendon seen  at about 3 cm distal to the greater tuberosity region. No definite muscle atrophy. Mild increased T2 signal changes are developing in  the supraspinatus muscle, early atrophic changes. - Infraspinatus: Moderate to pronounced tendinosis with partial-thickness tear  and intrasubstance tear. There may be a small full-thickness tear of the  anterior leading edge of the infraspinatus tendon. Increased T2 signal within  the muscle, suggestive of developing atrophic changes. - Subscapularis:  Moderate subscapularis tendon thickening, with mild increased  T2 signal changes suggestive of tendinosis. - Teres Minor:  Intact     Biceps Long Head:  Mild to moderate in width increased T2 signal changes  suggestive of tendinosis.     Labrum:  Intact biceps anchor, anterior labrum and superior labrum. The  posterior labrum appears absent, suggestive of maceration of the posterior  labrum. T2 high signal changes involving the posterior inferior portion of the  labrum, with multiple prominent adjacent subcortical cysts involving the  inferior glenoid rim.     Miscellaneous: There is no mass in the suprascapular or spinoglenoid notches. No significant inferior glenohumeral ligament thickening. No significant  abnormal signal alteration within the rotator cuff interval.       IMPRESSION  IMPRESSION:           1. Complete or near complete tear of the supraspinatus with retraction of the  torn margin of the tendon from the greater tuberosity by about 3 cm. Developing  atrophic changes with increased T2 signal in the supraspinatus muscle belly.      2. Infraspinatus with moderate to pronounced tendinosis with partial tear and  intrasubstance tear. Small full thickness tear at the superior anterior leading  margin of the tendon cannot be excluded.      3.  Subscapularis moderate tendinosis.   Long head biceps moderate tendinosis.      4. Maceration of the posterior labrum. Inferior labral tear may also be  present with increased T2 signal changes and adjacent glenoid rim subcortical  cysts.     5.  Mild osteoarthrosis at the glenohumeral joint. Mild degenerative  hypertrophy at the [facility] joint.

## 2019-07-16 ENCOUNTER — OFFICE VISIT (OUTPATIENT)
Dept: ORTHOPEDIC SURGERY | Facility: CLINIC | Age: 74
End: 2019-07-16

## 2019-07-16 VITALS
RESPIRATION RATE: 16 BRPM | DIASTOLIC BLOOD PRESSURE: 96 MMHG | HEIGHT: 67 IN | TEMPERATURE: 98 F | WEIGHT: 161 LBS | OXYGEN SATURATION: 99 % | HEART RATE: 68 BPM | BODY MASS INDEX: 25.27 KG/M2 | SYSTOLIC BLOOD PRESSURE: 132 MMHG

## 2019-07-16 DIAGNOSIS — S46.012A TRAUMATIC COMPLETE TEAR OF LEFT ROTATOR CUFF, INITIAL ENCOUNTER: Primary | ICD-10-CM

## 2019-07-16 NOTE — PROGRESS NOTES
1. Have you been to the ER, urgent care clinic since your last visit? Hospitalized since your last visit? NO    2. Have you seen or consulted any other health care providers outside of the [address] [place] [address] since your last visit? Include any pap smears or colon screening.  NO

## 2019-07-16 NOTE — PROGRESS NOTES
Patient: Blossom Dickerson                MRN: [id number]       SSN: [id number]  YOB: 1945        AGE: 68 y.o. SEX: female  Body mass index is 25.22 kg/m². PCP: Kiran Perez MD  07/16/19    Chief Complaint: Left shoulder pain    HISTORY OF PRESENT ILLNESS:  Ministerio Moyer is a 68year-old female who presents today with an acute injury to her left shoulder. She injured it back in May when she had a fall. She had no antecedent problems with her shoulder. She has been seen by Marlene Small and was sent to physical therapy. She is not responding as well to physical therapy as she would like. She is here today to discuss further treatment. She did have an MRI done. Past Medical History:   Diagnosis Date    Hypertension        Family History   Problem Relation Age of Onset    Breast Cancer Mother 59       Current Outpatient Medications   Medication Sig Dispense Refill    mycophenolate mofetil (CELLCEPT) 250 mg capsule take 1 capsule by mouth twice a day 180 Cap 3    calcium-cholecalciferol, d3, (CALCIUM 600 + D) 600-125 mg-unit tab Take  by mouth.  diclofenac (VOLTAREN) 1 % gel Apply 4 g to affected area two (2) times a day. Apply to affected area as directed. 100 g 1    hydroCHLOROthiazide (HYDRODIURIL) 12.5 mg tablet Take 12.5 mg by mouth daily. No Known Allergies    History reviewed. No pertinent surgical history.     Social History     Socioeconomic History    Marital status:      Spouse name: Not on file    Number of children: Not on file    Years of education: Not on file    Highest education level: Not on file   Occupational History    Not on file   Social Needs    Financial resource strain: Not on file    Food insecurity:     Worry: Not on file     Inability: Not on file    Transportation needs:     Medical: Not on file     Non-medical: Not on file   Tobacco Use    Smoking status: Former Smoker    Smokeless tobacco: Never Used Substance and Sexual Activity    Alcohol use: No    Drug use: Never    Sexual activity: Not on file   Lifestyle    Physical activity:     Days per week: Not on file     Minutes per session: Not on file    Stress: Not on file   Relationships    Social connections:     Talks on phone: Not on file     Gets together: Not on file     Attends [employer] service: Not on file     Active member of club or organization: Not on file     Attends meetings of clubs or organizations: Not on file     Relationship status: Not on file    Intimate partner violence:     Fear of current or ex partner: Not on file     Emotionally abused: Not on file     Physically abused: Not on file     Forced sexual activity: Not on file   Other Topics Concern    Not on file   Social History Narrative    Not on file       REVIEW OF SYSTEMS:      CON: negative for recent weight loss/gain, fever, or chills  EYE: negative for double or blurry vision  ENT: negative for hoarseness  RS:   negative for cough, URI, SOB  CV:  negative for chest pain, palpitations  GI:    negative for blood in stool, nausea/vomiting  :  negative for blood in urine  MS: As per HPI  Other systems reviewed and noted below. PHYSICAL EXAMINATION:  Visit Vitals  BP (!) 132/96 (BP 1 Location: Left arm, BP Patient Position: Sitting)   Pulse 68   Temp 98 °F (36.7 °C) (Oral)   Resp 16   Ht 5' 7\" (1.702 m)   Wt 161 lb (73 kg)   SpO2 99%   BMI 25.22 kg/m²     Body mass index is 25.22 kg/m². GENERAL: Alert and oriented x3, in no acute distress, well-developed, well-nourished. HEENT: Normocephalic, atraumatic. RESP: Non labored breathing with equal chest rise on inspiration. CV: Well perfused extremities. No cyanosis or clubbing noted. ABDOMEN: Soft, non-tender, non-distended. PHYSICAL EXAM:  Physical exam of the left shoulder with full passive range of motion with some pain at the extremes. Active range of motion is limited to about 90 degrees of forward flexion. She has pain with supraspinatus rotator cuff testing, as well as infraspinatus testing. She is neurovascularly intact otherwise. IMAGING:  An MRI of the left shoulder was reviewed. This shows a full thickness tear of the supraspinatus with retraction to the mid humeral level. ASSESSMENT AND PLAN:   Nicol Rueda is a 68year-old female with a left traumatic rotator cuff tear. This may be an acute/chronic tear, although I do not appreciate much in the way of atrophy or other signs of chronic tear. I had a lengthy discussion with her regarding treatment options. She was seeing some improvement in physical therapy. We also discussed the possibility of surgery. She would like to try one more round of physical therapy to see how she responds. If she does not see much improvement in the next six weeks, she will come back and we will talk about doing surgery in the form of an arthroscopic rotator cuff repair.               Electronically signed by: George David MD

## 2019-07-17 ENCOUNTER — HOSPITAL ENCOUNTER (OUTPATIENT)
Dept: PHYSICAL THERAPY | Age: 74
Discharge: HOME | End: 2019-07-17
Payer: MEDICARE

## 2019-07-17 PROCEDURE — 97110 THERAPEUTIC EXERCISES: CPT

## 2019-07-17 PROCEDURE — 97014 ELECTRIC STIMULATION THERAPY: CPT

## 2019-07-17 NOTE — PROGRESS NOTES
PT DAILY TREATMENT NOTE 10-18    Patient Name: Lou Jaimes  Date:2019  : 1945  [x]  Patient  Verified  Payor: Rosario Garza / Plan: [address] [place] [address] HMO / Product Type: Managed Care Medicare /    In time:758  Out time:851  Total Treatment Time (min): 48  Visit #: 1 of 8    Medicare/BCBS Only   Total Timed Codes (min):  38 1:1 Treatment Time:  38       Treatment Area: Pain in left shoulder [M25.512]    SUBJECTIVE  Pain Level (0-10 scale): 2-3  Any medication changes, allergies to medications, adverse drug reactions, diagnosis change, or new procedure performed?: [x] No    [] Yes (see summary sheet for update)  Subjective functional status/changes:   [] No changes reported  See recert    OBJECTIVE    Modality rationale: decrease inflammation and decrease pain to improve the patients ability to perform ADLs   Min Type Additional Details   15 [x] Estim:  [x]Unatt       [x]IFC  []Premod                        []Other:  [x]w/ice   []w/heat  Position: long sitting   Location: left shoulder    [] Estim: []Att    []TENS instruct  []NMES                    []Other:  []w/US   []w/ice   []w/heat  Position:  Location:    []  Traction: [] Cervical       []Lumbar                       [] Prone          []Supine                       []Intermittent   []Continuous Lbs:  [] before manual  [] after manual    []  Ultrasound: []Continuous   [] Pulsed                           []1MHz   []3MHz W/cm2:  Location:    []  Iontophoresis with dexamethasone         Location: [] Take home patch   [] In clinic    []  Ice     []  heat  []  Ice massage  []  Laser   []  Anodyne Position:  Location:    []  Laser with stim  []  Other:  Position:  Location:    []  Vasopneumatic Device Pressure:       [] lo [] med [] hi   Temperature: [] lo [] med [] hi   [] Skin assessment post-treatment:  [x]intact []redness- no adverse reaction    []redness - adverse reaction:     38 min Therapeutic Exercise:  [x] See flow sheet : Rationale: increase ROM and increase strength to improve the patients ability to reach overhead          With   [] TE   [] TA   [] neuro   [] other: Patient Education: [x] Review HEP    [] Progressed/Changed HEP based on:   [] positioning   [] body mechanics   [] transfers   [] heat/ice application    [] other:      Other Objective/Functional Measures: see recert     Pain Level (0-10 scale) post treatment: 2    ASSESSMENT/Changes in Function: see recert    Patient will continue to benefit from skilled PT services to modify and progress therapeutic interventions, address functional mobility deficits, address ROM deficits, address strength deficits, analyze and address soft tissue restrictions, analyze and cue movement patterns, analyze and modify body mechanics/ergonomics, assess and modify postural abnormalities, address imbalance/dizziness and instruct in home and community integration to attain remaining goals. []  See Plan of Care  []  See progress note/recertification  []  See Discharge Summary         Progress towards goals / Updated goals: 1.   Pt will demonstrate left shoulder AROM flexion to 120 in order to improve ability to perform dressing               UPDATED: PROGRESSING = 70  2.   Pt will demonstrate left shoulder AROM functional ER to T2 in order to improve ability to bathe                Recert Status: PROGRESSING = base of occiput  3.   Pt will demonstrate left shoulder AROM functional IR to T10 in order to improve ability to don belt and/or undergarments               Recert Status: PROGRESSING = T11  4.   Pt will score at least 59 on FOTO in order to improve overall function, decrease pain, and facilitate return to PLOF.                Recert Status: PROGRESSING = 32  5.   Pt will demonstrate right hip and knee strength minimum of 5-/5 in order to negotiation stairs at PLOF               Recert Status: PROGRESSING = knee flex/ext = 4+/5, hip flex/IR = 4+/5, hip ER/abd/ext = 4/5    PLAN  [x]  Upgrade activities as tolerated     [x]  Continue plan of care  []  Update interventions per flow sheet       []  Discharge due to:_  []  Other:_      Chris Jacobson, PT 7/17/2019  8:48 AM    Future Appointments   Date Time Provider Dyllan Fishman   7/19/2019 10:30 AM [facility]   7/22/2019  8:30 AM [facility]   7/24/2019  2:00 PM Manohar Alonso PT [facility]   12/17/2019  8:30 AM [facility] RM 1 [facility]   12/17/2019 11:40 AM Avelino Maddox NP [facility]

## 2019-07-17 NOTE — PROGRESS NOTES
[facility] - [place] [address]  [address], [address] [phone] fax    Continued Plan of Care/ Re-certification for Physical Therapy Services    Patient name: Matias Fierro Start of Care: 2019   Referral source: Walter Desouza : 1945                Medical Diagnosis: Pain in left shoulder [M25.512]  Payor: Betsy Hawthorne / Plan: [address] [place] [address] HMO / Product Type: Managed Care Medicare /  Onset Date:19                Treatment Diagnosis: left hip and shoulder pain   Prior Hospitalization: see medical history Provider#: [id number]   Medications: Verified on Patient summary List    Comorbidities: HTN, recent fall   Prior Level of Function: functionally (I)     Visits from Start of Care: 9    Missed Visits: 1    The Plan of Care and following information is based on the patient's current status:  1.   Pt will report compliance with initial HEP              eval = established              MET  2.   Pt will demonstrate left shoulder AROM flexion to 145 in order to improve ability to perform dressing              Eval = 38              PROGRESSING = 70  3.   Pt will demonstrate left shoulder AROM functional ER to T2 in order to improve ability to bathe               Eval = unable, supine PROM = 30              PROGRESSING = base of occiput  4.   Pt will demonstrate left shoulder AROM functional IR to T10 in order to improve ability to don belt and/or undergarments              Eval = unable, supine PROM = 75              PROGRESSING = T11     Long Term Goals: To be accomplished in 10 weeks: 1.   Pt will score at least 59 on FOTO in order to improve overall function, decrease pain, and facilitate return to PLOF.               Eval = 20              PROGRESSING = 32  2.   Pt will demonstrate left shoulder strength to at least 4+/5 in order to improve ability to lift objects shoulder height and perform heavy ADLs              Eval = unable secondary to pain              CONTINUE TO DEFER SECONDARY TO PAIN  3.   Pt will report max pain 2 in order to dress and perform ADLs at OF.                Eval = 10/10              PROGRESSING = 5/10  4.   Pt will demonstrate right hip and knee strength minimum of 5-/5 in order to negotiation stairs at OF              Eval = range 3-5-/10              PROGRESSING = knee flex/ext = 4+/5, hip flex/IR = 4+/5, hip ER/abd/ext = 4/5     Key functional changes:   Functional Gains: decreased pain, range of motion increased, thigh and hip pain minimized, balance/equilibrium improved  Functional Deficits: combing hair, ADLs, reaching with left UE - ROM  % improvement: 40%  Pain   Average: 2-3/10       Best: 2-3/10     Worst: 5/10  Patient Goal: \"have 100% range of motion and mobility; to be pain free; to play golf again\"      Problems/ barriers to goal attainment: pain, significant RTC pathology    Problem List: pain affecting function, decrease ROM, decrease strength, impaired gait/ balance, decrease ADL/ functional abilitiies, decrease activity tolerance, decrease flexibility/ joint mobility and decrease transfer abilities    Treatment Plan: Therapeutic exercise, Therapeutic activities, Neuromuscular re-education, Physical agent/modality, Gait/balance training, Manual therapy, Patient education, Self Care training, Functional mobility training, Home safety training and Stair training     Goals for this certification period to be accomplished in 8 treatments:  1.   Pt will demonstrate left shoulder AROM flexion to 120 in order to improve ability to perform dressing   UPDATED: PROGRESSING = 70  2.   Pt will demonstrate left shoulder AROM functional ER to T2 in order to improve ability to bathe    Recert Status: PROGRESSING = base of occiput  3.   Pt will demonstrate left shoulder AROM functional IR to T10 in order to improve ability to don belt and/or undergarments   Recert Status: PROGRESSING = T11  4.   Pt will score at least 59 on FOTO in order to improve overall function, decrease pain, and facilitate return to PLOF. Recert Status: PROGRESSING = 32  5.   Pt will demonstrate right hip and knee strength minimum of 5-/5 in order to negotiation stairs at PLOF   Recert Status: PROGRESSING = knee flex/ext = 4+/5, hip flex/IR = 4+/5, hip ER/abd/ext = 4/5      Frequency / Duration: Patient to be seen 2 times per week for 8 treatments:    Assessment / Recommendations:Ms. Jojo David returns to PT today after 3 week hiatus while awaiting an MRI and results. She has been counseled by MD regarding significant RTC tears; she has chosen to continue with PT in attempt to avoid surgery. Pt's shoulder AROM continues to be significantly impaired but has improved since evaluation. Pt's hip strength and pain have significantly improved resulting in nearly normalized gait. We will continue to see her 2x/week for the next 6 weeks until follow up. Certification Period: 7/17/19-8/15/19    Kelechi Torres, PT 7/17/2019 8:01 AM    ________________________________________________________________________  I certify that the above Therapy Services are being furnished while the patient is under my care. I agree with the treatment plan and certify that this therapy is necessary. [] I have read the above and request that my patient continue as recommended.   [] I have read the above report and request that my patient continue therapy with the following changes/special instructions: _____________________________________________  [] I have read the above report and request that my patient be discharged from therapy    Physician's Signature:____________Date:_________TIME:________    ** Signature, Date and Time must be completed for valid certification **    Please sign and return to [facility] [place] [address]  [address], [address] [phone] fax

## 2019-07-19 ENCOUNTER — HOSPITAL ENCOUNTER (OUTPATIENT)
Dept: PHYSICAL THERAPY | Age: 74
Discharge: HOME | End: 2019-07-19
Payer: MEDICARE

## 2019-07-19 PROCEDURE — 97014 ELECTRIC STIMULATION THERAPY: CPT

## 2019-07-19 PROCEDURE — 97110 THERAPEUTIC EXERCISES: CPT

## 2019-07-19 PROCEDURE — 97140 MANUAL THERAPY 1/> REGIONS: CPT

## 2019-07-19 NOTE — PROGRESS NOTES
PT DAILY TREATMENT NOTE 10-18    Patient Name: Mary Kate Haider  Date:2019  : 1945  [x]  Patient  Verified  Payor: Jesikaoscar Reid / Plan: [address] [place] [address] HMO / Product Type: Managed Care Medicare /    In time:1025  Out time:1128  Total Treatment Time (min): 63  Visit #: 2 of 8    Medicare/BCBS Only   Total Timed Codes (min):  48 1:1 Treatment Time:  48       Treatment Area: Pain in left shoulder [M25.512]    SUBJECTIVE  Pain Level (0-10 scale): 2  Any medication changes, allergies to medications, adverse drug reactions, diagnosis change, or new procedure performed?: [x] No    [] Yes (see summary sheet for update)  Subjective functional status/changes:   [] No changes reported  Pt reports she did not have increased pain after last visit.  She did wake up with 5/10 pain this morning but took a voltaren and is now down to a 2/10    OBJECTIVE    Modality rationale: decrease edema, decrease inflammation, decrease pain and increase tissue extensibility to improve the patients ability to perform ADLs   Min Type Additional Details   15 [x] Estim:  [x]Unatt       [x]IFC  []Premod                        []Other:  [x]w/ice   []w/heat  Position:reclined long sitting  Location: left shoulder    [] Estim: []Att    []TENS instruct  []NMES                    []Other:  []w/US   []w/ice   []w/heat  Position:  Location:    []  Traction: [] Cervical       []Lumbar                       [] Prone          []Supine                       []Intermittent   []Continuous Lbs:  [] before manual  [] after manual    []  Ultrasound: []Continuous   [] Pulsed                           []1MHz   []3MHz W/cm2:  Location:    []  Iontophoresis with dexamethasone         Location: [] Take home patch   [] In clinic    []  Ice     []  heat  []  Ice massage  []  Laser   []  Anodyne Position:  Location:    []  Laser with stim  []  Other:  Position:  Location:    []  Vasopneumatic Device Pressure:       [] lo [] med [] hi Temperature: [] lo [] med [] hi   [] Skin assessment post-treatment:  [x]intact []redness- no adverse reaction    []redness - adverse reaction:     38 min Therapeutic Exercise:  [x] See flow sheet :   Rationale: increase ROM, increase strength, improve coordination and increase proprioception to improve the patients ability to reach shoulder height    10 min Manual Therapy:  Soft tissue mobilization to left latissimus dorsi; sidelying left scapular mobilizations inferior, medial, downward rotation; resisted PNF scapular patterns   Rationale: decrease pain, increase ROM and increase tissue extensibility to improve pt's ability to clean her house           With   [] TE   [] TA   [] neuro   [] other: Patient Education: [x] Review HEP    [] Progressed/Changed HEP based on:   [] positioning   [] body mechanics   [] transfers   [] heat/ice application    [] other:      Other Objective/Functional Measures: left shoulder supine AROM flexion = 131degrees     Pain Level (0-10 scale) post treatment: 0    ASSESSMENT/Changes in Function: Pt is making progress with her left shoulder ROM and was able to tolerate AROM in supine today. Pt reports pain around 100degrees that reduces when she passes the range. Pt reports a pulling sensation at the back of the arm with flexion that was eliminated with manual therapy and release to latissimus dorsi. Patient will continue to benefit from skilled PT services to modify and progress therapeutic interventions, address functional mobility deficits, address ROM deficits, address strength deficits, analyze and address soft tissue restrictions, analyze and cue movement patterns, analyze and modify body mechanics/ergonomics, assess and modify postural abnormalities and instruct in home and community integration to attain remaining goals. []  See Plan of Care  []  See progress note/recertification  []  See Discharge Summary         Progress towards goals / Updated goals:   .   Pt will demonstrate left shoulder AROM flexion to 120 in order to improve ability to perform dressing               DOONWTM: PROGRESSING = 70   Progressing: AROM in supine = 131degrees  2.   Pt will demonstrate left shoulder AROM functional ER to T2 in order to improve ability to bathe                Recert Status: PROGRESSING = base of occiput   Progressing: performed seated neutral ER AROM without increase in pain   3.   Pt will demonstrate left shoulder AROM functional IR to T10 in order to improve ability to don belt and/or undergarments               Recert Status: PROGRESSING = T11  4.   Pt will score at least 59 on FOTO in order to improve overall function, decrease pain, and facilitate return to PLOF.                Recert Status: PROGRESSING = 32   To be reassessed at [address]  5.   Pt will demonstrate right hip and knee strength minimum of 5-/5 in order to negotiation stairs at [facility]               Recert Status: PROGRESSING = knee flex/ext = 4+/5, hip flex/IR = 4+/5, hip ER/abd/ext = 4/5    Progressing = able to perform 10x sit to stand without use of UE    PLAN  [x]  Upgrade activities as tolerated     [x]  Continue plan of care  []  Update interventions per flow sheet       []  Discharge due to:_  []  Other:_      Daneen Mcburney, PT 7/19/2019  11:16 AM    Future Appointments   Date Time Provider Dyllan Fishman   7/22/2019  8:30 AM [facility]   7/24/2019  2:00 PM ADRIENNE King [facility]   12/17/2019  8:30 AM THE [facility] RM 1 MIHRUS THE [facility]   12/17/2019 11:40 AM Inder Rocha NP [address] [facility]

## 2019-07-22 ENCOUNTER — HOSPITAL ENCOUNTER (OUTPATIENT)
Dept: PHYSICAL THERAPY | Age: 74
Discharge: HOME | End: 2019-07-22
Payer: MEDICARE

## 2019-07-22 PROCEDURE — 97014 ELECTRIC STIMULATION THERAPY: CPT

## 2019-07-22 PROCEDURE — 97140 MANUAL THERAPY 1/> REGIONS: CPT

## 2019-07-22 PROCEDURE — 97110 THERAPEUTIC EXERCISES: CPT

## 2019-07-22 NOTE — PROGRESS NOTES
PT DAILY TREATMENT NOTE 10-18    Patient Name: Matias Fierro  Date:2019  : 1945  [x]  Patient  Verified  Payor: Betsy Hawthorne / Plan: [address] [place] [address] HMO / Product Type: Managed Care Medicare /    In time:826  Out time:923  Total Treatment Time (min): 62  Visit #: 3 of 8    Medicare/BCBS Only   Total Timed Codes (min):  42 1:1 Treatment Time:  42       Treatment Area: Pain in left shoulder [M25.512]    SUBJECTIVE  Pain Level (0-10 scale): 1  Any medication changes, allergies to medications, adverse drug reactions, diagnosis change, or new procedure performed?: [x] No    [] Yes (see summary sheet for update)  Subjective functional status/changes:   [] No changes reported  Pt reports she has slightly increased pain later in the day after last PT visit but did fine over the weekend. Pt used her voltaren gel again today.      OBJECTIVE    Modality rationale: decrease inflammation and decrease pain to improve the patients ability to reach to shoulder height   Min Type Additional Details   15 [x] Estim:  [x]Unatt       [x]IFC  []Premod                        []Other:  [x]w/ice   []w/heat  Position: reclined long sitting  Location: left shoulder    [] Estim: []Att    []TENS instruct  []NMES                    []Other:  []w/US   []w/ice   []w/heat  Position:  Location:    []  Traction: [] Cervical       []Lumbar                       [] Prone          []Supine                       []Intermittent   []Continuous Lbs:  [] before manual  [] after manual    []  Ultrasound: []Continuous   [] Pulsed                           []1MHz   []3MHz W/cm2:  Location:    []  Iontophoresis with dexamethasone         Location: [] Take home patch   [] In clinic    []  Ice     []  heat  []  Ice massage  []  Laser   []  Anodyne Position:  Location:    []  Laser with stim  []  Other:  Position:  Location:    []  Vasopneumatic Device Pressure:       [] lo [] med [] hi   Temperature: [] lo [] med [] hi   [] Skin assessment post-treatment:  [x]intact []redness- no adverse reaction    []redness - adverse reaction:     34 min Therapeutic Exercise:  [x] See flow sheet :   Rationale: increase ROM and increase strength to improve the patients ability to perform ADLs    8 min Manual Therapy:  Sidelying scapular mobilization and MET with PNF resisted patterns; soft tissue mobilization to posterior musculature   Rationale: decrease pain, increase ROM and increase tissue extensibility to reach overhead          With   [] TE   [] TA   [] neuro   [] other: Patient Education: [x] Review HEP    [] Progressed/Changed HEP based on:   [] positioning   [] body mechanics   [] transfers   [] heat/ice application    [] other:      Other Objective/Functional Measures: standing AROM left shoulder flexion = 100degrees with shoulder hiking and thoracic extension      Pain Level (0-10 scale) post treatment: 1    ASSESSMENT/Changes in Function: Pt continues to progress with AROM of the left shoulder,however, has significant pain while performing exercises. Pt requires cuing to prevent substitutive patterns; able to complete ROM in supine without shoulder hiking. Patient will continue to benefit from skilled PT services to modify and progress therapeutic interventions, address functional mobility deficits, address ROM deficits, address strength deficits, analyze and address soft tissue restrictions, analyze and cue movement patterns, analyze and modify body mechanics/ergonomics and assess and modify postural abnormalities to attain remaining goals. []  See Plan of Care  []  See progress note/recertification  []  See Discharge Summary         Progress towards goals / Updated goals:  Progress towards goals / Updated goals:   .   Pt will demonstrate left shoulder AROM flexion to 120 in order to improve ability to perform dressing               JBLUPSI: PROGRESSING = 70              Progressing: AROM in standing = 100degrees  2.   Pt will demonstrate left shoulder AROM functional ER to T2 in order to improve ability to bathe                Recert Status: PROGRESSING = base of occiput              Progressing: performed seated neutral ER AROM without increase in pain   3.   Pt will demonstrate left shoulder AROM functional IR to T10 in order to improve ability to don belt and/or undergarments               Recert Status: PROGRESSING = T11  4.   Pt will score at least 59 on FOTO in order to improve overall function, decrease pain, and facilitate return to PLOF.                Recert Status: PROGRESSING = 32              To be reassessed at [address]  5.   Pt will demonstrate right hip and knee strength minimum of 5-/5 in order to negotiation stairs at PLOF               Recert Status: PROGRESSING = knee flex/ext = 4+/5, hip flex/IR = 4+/5, hip ER/abd/ext = 4/5              Progressing = able to perform 10x sit to stand without use of UE    PLAN  [x]  Upgrade activities as tolerated     [x]  Continue plan of care  []  Update interventions per flow sheet       []  Discharge due to:_  []  Other:_      Aria Way PT 7/22/2019  9:56 AM    Future Appointments   Date Time Provider Dyllan Fishman   7/24/2019  2:00 PM Santos Bey, PT [facility] [address]   8/5/2019 10:30 AM [facility] [address]   8/7/2019 10:30 AM Snatos [facility] [address]   8/12/2019 10:30 AM [facility] [address]   8/14/2019 10:30 AM [facility] [address]   8/19/2019 10:30 AM [facility] [address]   8/21/2019 10:30 AM [facility] [address]   8/26/2019 10:30 AM [facility] [address]   8/28/2019 10:30 AM [facility] [address]   12/17/2019  8:30 AM [facility]   12/17/2019 11:40 AM Severiano Shaggy, NP [address] [facility]

## 2019-07-24 ENCOUNTER — HOSPITAL ENCOUNTER (OUTPATIENT)
Dept: PHYSICAL THERAPY | Age: 74
Discharge: HOME | End: 2019-07-24
Payer: MEDICARE

## 2019-07-24 PROCEDURE — 97014 ELECTRIC STIMULATION THERAPY: CPT

## 2019-07-24 PROCEDURE — 97140 MANUAL THERAPY 1/> REGIONS: CPT

## 2019-07-24 PROCEDURE — 97110 THERAPEUTIC EXERCISES: CPT

## 2019-07-24 NOTE — PROGRESS NOTES
PT DAILY TREATMENT NOTE 10-18    Patient Name: Jacqui Signs  Date:2019  : 1945  [x]  Patient  Verified  Payor: Marjorie Hinds / Plan: [address] [place] [address] HMO / Product Type: Managed Care Medicare /    In time:200  Out time:259  Total Treatment Time (min): 59  Visit #: 4 of 8    Medicare/BCBS Only   Total Timed Codes (min):  44 1:1 Treatment Time:  44       Treatment Area: Pain in left shoulder [M25.512]    SUBJECTIVE  Pain Level (0-10 scale):  2  Any medication changes, allergies to medications, adverse drug reactions, diagnosis change, or new procedure performed?: [x] No    [] Yes (see summary sheet for update)  Subjective functional status/changes:   [] No changes reported  Pt has not significant changes to report today     OBJECTIVE    Modality rationale: decrease inflammation and decrease pain to improve the patients ability to reach to shoulder height   Min Type Additional Details    15 [x] Estim:  [x]Unatt       [x]IFC  []Premod                        []Other:  [x]w/ice   []w/heat  Position: reclined long sitting  Location: left shoulder      [] Estim: []Att    []TENS instruct  []NMES                    []Other:  []w/US   []w/ice   []w/heat  Position:  Location:      []  Traction: [] Cervical       []Lumbar                       [] Prone          []Supine                       []Intermittent   []Continuous Lbs:  [] before manual  [] after manual      []  Ultrasound: []Continuous   [] Pulsed                           []1MHz   []3MHz W/cm2:  Location:      []  Iontophoresis with dexamethasone         Location: [] Take home patch   [] In clinic      []  Ice     []  heat  []  Ice massage  []  Laser   []  Anodyne Position:  Location:      []  Laser with stim  []  Other:  Position:  Location:      []  Vasopneumatic Device Pressure:       [] lo [] med [] hi   Temperature: [] lo [] med [] hi    [] Skin assessment post-treatment:  [x]intact []redness- no adverse reaction    []redness - adverse reaction:       34 min Therapeutic Exercise:  [x] See flow sheet :   Rationale: increase ROM and increase strength to improve the patients ability to improve ability reach to shoulder height    10 min Manual Therapy:  Sidelying scapular mobilization and MET with PNF resisted patterns; soft tissue mobilization to posterior musculature   Rationale: decrease pain, increase ROM and increase tissue extensibility to perform ADLs          With   [] TE   [] TA   [] neuro   [] other: Patient Education: [x] Review HEP    [] Progressed/Changed HEP based on:   [] positioning   [] body mechanics   [] transfers   [] heat/ice application    [] other:      Other Objective/Functional Measures: left shoulder AROM flexion = 100degrees in standing      Pain Level (0-10 scale) post treatment: 0    ASSESSMENT/Changes in Function: Pt continues to progress towards her stated goals. Pt tolerated new exercises without increase in pain. Pt AROM flexion continues to be limited secondary to RTC pathology. Pt's hip strength and pain continue to improve. Patient will continue to benefit from skilled PT services to modify and progress therapeutic interventions, address functional mobility deficits, address ROM deficits, address strength deficits, analyze and address soft tissue restrictions, analyze and cue movement patterns, analyze and modify body mechanics/ergonomics and assess and modify postural abnormalities to attain remaining goals. []  See Plan of Care  []  See progress note/recertification  []  See Discharge Summary         Progress towards goals / Updated goals:   1.   Pt will demonstrate left shoulder AROM flexion to 120 in order to improve ability to perform dressing               AQEYBOK: PROGRESSING = 70              Progressing: AROM in standing = 100degrees (7/24/19)  2.   Pt will demonstrate left shoulder AROM functional ER to T2 in order to improve ability to bathe                Recert Status: PROGRESSING = base of occiput              Progressing: performed seated neutral ER AROM without increase in pain   3.   Pt will demonstrate left shoulder AROM functional IR to T10 in order to improve ability to don belt and/or undergarments               Recert Status: PROGRESSING = T11  4.   Pt will score at least 59 on FOTO in order to improve overall function, decrease pain, and facilitate return to PLOF.                Recert Status: PROGRESSING = 32              To be reassessed at Recert  5.   Pt will demonstrate right hip and knee strength minimum of 5-/5 in order to negotiation stairs at PLOF               Recert Status: PROGRESSING = knee flex/ext = 4+/5, hip flex/IR = 4+/5, hip ER/abd/ext = 4/5              Progressing = able to perform 10x sit to stand without use of UE    PLAN  [x]  Upgrade activities as tolerated     [x]  Continue plan of care  []  Update interventions per flow sheet       []  Discharge due to:_  []  Other:_      Darian Mark, PT 7/24/2019  2:06 PM    Future Appointments   Date Time Provider Dyllan Fishman   8/5/2019 10:30 AM Laneta Calender, PT [facility]   8/7/2019 10:30 AM Laneta Calender, PT [facility]   8/12/2019 10:30 AM Laneta Calender, PT [facility]   8/14/2019 10:30 AM Laneta Calender, PT [facility]   8/19/2019 10:30 AM Laneta Calender, PT [facility]   8/21/2019 10:30 AM Laneta Calender, PT [facility]   8/26/2019 10:30 AM Laneta Calender, PT [facility]   8/28/2019 10:30 AM Laneta Calender, PT [facility]   12/17/2019  8:30 AM [facility] RM 1 MIHRUS THE [facility]   12/17/2019 11:40 AM Shailesh Dougherty NP [address] [facility]

## 2019-08-05 ENCOUNTER — HOSPITAL ENCOUNTER (OUTPATIENT)
Dept: PHYSICAL THERAPY | Age: 74
Discharge: HOME | End: 2019-08-05
Payer: MEDICARE

## 2019-08-05 PROCEDURE — 97110 THERAPEUTIC EXERCISES: CPT

## 2019-08-05 PROCEDURE — 97014 ELECTRIC STIMULATION THERAPY: CPT

## 2019-08-05 NOTE — PROGRESS NOTES
PT DAILY TREATMENT NOTE 10-18    Patient Name: Beth Lee  Date:2019  : 1945  [x]  Patient  Verified  Payor: Roscoe Santos / Plan: [address] [place] [address] HMO / Product Type: Managed Care Medicare /    In QFSJ:1292  Out time:1122  Total Treatment Time (min): 50  Visit #: 5 of 8    Medicare/BCBS Only   Total Timed Codes (min):  35 1:1 Treatment Time:  34       Treatment Area: Pain in left shoulder [M25.512]    SUBJECTIVE  Pain Level (0-10 scale): 2  Any medication changes, allergies to medications, adverse drug reactions, diagnosis change, or new procedure performed?: [x] No    [] Yes (see summary sheet for update)  Subjective functional status/changes:   [] No changes reported  Pt reports she has been feeling well when out of town. She did her HEP. She was able to fly without difficulty and navigate her luggage, however, she could not do the scan for TSA because she couldn't get her arm up so she had to be screened to the side.      OBJECTIVE    Modality rationale: decrease inflammation and decrease pain to improve the patients ability to reach to shoulder height   Min Type Additional Details   15 [x] Estim:  [x]Unatt       [x]IFC  []Premod                        []Other:  [x]w/ice   []w/heat  Position: seated  Location: left shoulder     [] Estim: []Att    []TENS instruct  []NMES                    []Other:  []w/US   []w/ice   []w/heat  Position:  Location:    []  Traction: [] Cervical       []Lumbar                       [] Prone          []Supine                       []Intermittent   []Continuous Lbs:  [] before manual  [] after manual    []  Ultrasound: []Continuous   [] Pulsed                           []1MHz   []3MHz W/cm2:  Location:    []  Iontophoresis with dexamethasone         Location: [] Take home patch   [] In clinic    []  Ice     []  heat  []  Ice massage  []  Laser   []  Anodyne Position:  Location:    []  Laser with stim  []  Other:  Position:  Location:    [] Vasopneumatic Device Pressure:       [] lo [] med [] hi   Temperature: [] lo [] med [] hi   [] Skin assessment post-treatment:  [x]intact []redness- no adverse reaction    []redness - adverse reaction:     34 min Therapeutic Exercise:  [x] See flow sheet :   Rationale: increase ROM and increase strength to improve the patients ability to perform ADLs          With   [] TE   [] TA   [] neuro   [] other: Patient Education: [x] Review HEP    [] Progressed/Changed HEP based on:   [] positioning   [] body mechanics   [] transfers   [] heat/ice application    [] other:      Other Objective/Functional Measures: standing left shoulder flexion AAROM = 103, AROM = 100     Pain Level (0-10 scale) post treatment: 0    ASSESSMENT/Changes in Function: Pt exercise tolerance is improving - able to increase repetitions without worsening of symptoms. In addition, pt's AROM toleration is improving despite no current changes in range. Patient will continue to benefit from skilled PT services to modify and progress therapeutic interventions, address functional mobility deficits, address ROM deficits, address strength deficits, analyze and address soft tissue restrictions, analyze and cue movement patterns, analyze and modify body mechanics/ergonomics and assess and modify postural abnormalities to attain remaining goals. []  See Plan of Care  []  See progress note/recertification  []  See Discharge Summary         Progress towards goals / Updated goals:   1.   Pt will demonstrate left shoulder AROM flexion to 120 in order to improve ability to perform dressing               MWKRPRX: PROGRESSING = 70              Progressing: AROM in standing = 100degrees (7/24/19, 8/5/19)  2.   Pt will demonstrate left shoulder AROM functional ER to T2 in order to improve ability to bathe                Recert Status: PROGRESSING = base of occiput              Progressing: performed seated neutral ER AROM without increase in pain   3.   Pt will demonstrate left shoulder AROM functional IR to T10 in order to improve ability to don belt and/or undergarments               Recert Status: PROGRESSING = T11  4.   Pt will score at least 59 on FOTO in order to improve overall function, decrease pain, and facilitate return to PLOF.                Recert Status: PROGRESSING = 32              To be reassessed at Recert  5.   Pt will demonstrate right hip and knee strength minimum of 5-/5 in order to negotiation stairs at OF               Recert Status: PROGRESSING = knee flex/ext = 4+/5, hip flex/IR = 4+/5, hip ER/abd/ext = 4/5              Progressing = able to perform 10x sit to stand without use of UE    PLAN  [x]  Upgrade activities as tolerated     [x]  Continue plan of care  []  Update interventions per flow sheet       []  Discharge due to:_  []  Other:_      Kelechi Torres, PT 8/5/2019  1:56 PM    Future Appointments   Date Time Provider Dyllan Fishman   8/7/2019 10:30 AM Laury Turpin, PT [facility]   8/12/2019 10:30 AM [facility]   8/14/2019 10:30 AM Laury Turpin, PT [facility]   8/19/2019 12:00 PM [facility]   8/20/2019 11:00 AM Giuseppe Sampson MD [facility]   8/21/2019 12:00 PM Laury Turpin, PT [facility]   8/26/2019 12:00 PM Laury Turpin, PT [facility]   8/28/2019 12:00 PM Laury Turpin, PT [facility]   12/17/2019  8:30 AM [facility] RM 1 [facility]   12/17/2019 11:40 AM Nicanor Gan NP [address]

## 2019-08-07 ENCOUNTER — HOSPITAL ENCOUNTER (OUTPATIENT)
Dept: PHYSICAL THERAPY | Age: 74
Discharge: HOME | End: 2019-08-07
Payer: MEDICARE

## 2019-08-07 PROCEDURE — 97110 THERAPEUTIC EXERCISES: CPT

## 2019-08-07 NOTE — PROGRESS NOTES
PT DAILY TREATMENT NOTE 10-18    Patient Name: Juanjo Hernandez  Date:2019  : 1945  [x]  Patient  Verified  Payor: Saeid Hahn / Plan: [address] [place] [address] HMO / Product Type: Managed Care Medicare /    In time:1030  Out time:1125  Total Treatment Time (min): 55  Visit #: 6 of 8    Medicare/BCBS Only   Total Timed Codes (min):  40 1:1 Treatment Time:  38       Treatment Area: Pain in left shoulder [M25.512]    SUBJECTIVE  Pain Level (0-10 scale): 0  Any medication changes, allergies to medications, adverse drug reactions, diagnosis change, or new procedure performed?: [x] No    [] Yes (see summary sheet for update)  Subjective functional status/changes:   [] No changes reported  \"I am doing really well today. \"    OBJECTIVE    Modality rationale: decrease inflammation and decrease pain to improve the patients ability to reach overhead   Min Type Additional Details   15 [x] Estim:  [x]Unatt       [x]IFC  []Premod                        []Other:  [x]w/ice   []w/heat  Position: long sitting  Location: left shoulder    [] Estim: []Att    []TENS instruct  []NMES                    []Other:  []w/US   []w/ice   []w/heat  Position:  Location:    []  Traction: [] Cervical       []Lumbar                       [] Prone          []Supine                       []Intermittent   []Continuous Lbs:  [] before manual  [] after manual    []  Ultrasound: []Continuous   [] Pulsed                           []1MHz   []3MHz W/cm2:  Location:    []  Iontophoresis with dexamethasone         Location: [] Take home patch   [] In clinic    []  Ice     []  heat  []  Ice massage  []  Laser   []  Anodyne Position:  Location:    []  Laser with stim  []  Other:  Position:  Location:    []  Vasopneumatic Device Pressure:       [] lo [] med [] hi   Temperature: [] lo [] med [] hi   [] Skin assessment post-treatment:  [x]intact []redness- no adverse reaction    []redness - adverse reaction:     38 min Therapeutic Exercise:  [x] See flow sheet : including manual PNF resisted scapular patterns, STM with trigger point release to left upper trap; MET scapular mobilizations   Rationale: increase ROM and increase strength to improve the patients ability to perform ADLs          With   [] TE   [] TA   [] neuro   [] other: Patient Education: [x] Review HEP    [] Progressed/Changed HEP based on:   [] positioning   [] body mechanics   [] transfers   [] heat/ice application    [] other:      Other Objective/Functional Measures: cone reaching = 116degrees; standing AROM left shoulder flexion = 107; left functional ER = T2, functional IR = T10      Pain Level (0-10 scale) post treatment: 0    ASSESSMENT/Changes in Function: Pt reports up to 4/10 pain on wall slides and 7/10 on AROM standing shoulder flexion, however, with rest pt pain reduces. Pt demonstrating significantly improved AROM today, meeting shoulder functional IR goal and nearly meeting shoulder functional ER goal. Pt pain continues to be resolved after modalities. Patient will continue to benefit from skilled PT services to modify and progress therapeutic interventions, address functional mobility deficits, address ROM deficits, address strength deficits, analyze and address soft tissue restrictions, analyze and cue movement patterns, analyze and modify body mechanics/ergonomics and assess and modify postural abnormalities to attain remaining goals. []  See Plan of Care  []  See progress note/recertification  []  See Discharge Summary         Progress towards goals / Updated goals:   1.   Pt will demonstrate left shoulder AROM flexion to 120 in order to improve ability to perform dressing               DKIGGRO: PROGRESSING = 70              Progressing: AROM in standing during cone reach= 116degrees; AROM without shelf assist = 107  2.   Pt will demonstrate left shoulder AROM functional ER to T2 in order to improve ability to bathe                Recert Status: PROGRESSING = base of occiput              Progressing = T1  3.   Pt will demonstrate left shoulder AROM functional IR to T10 in order to improve ability to don belt and/or undergarments               Recert Status: PROGRESSING = T11   MET = T10  4.   Pt will score at least 59 on FOTO in order to improve overall function, decrease pain, and facilitate return to PLOF.                Recert Status: PROGRESSING = 32              To be reassessed at Recert  5.   Pt will demonstrate right hip and knee strength minimum of 5-/5 in order to negotiation stairs at PLOF               Recert Status: PROGRESSING = knee flex/ext = 4+/5, hip flex/IR = 4+/5, hip ER/abd/ext = 4/5              Progressing = able to perform 10x sit to stand without use of UE    PLAN  [x]  Upgrade activities as tolerated     [x]  Continue plan of care  []  Update interventions per flow sheet       []  Discharge due to:_  []  Other:_      Julianna Rojo, PT 8/7/2019  10:34 AM    Future Appointments   Date Time Provider Dyllan Fishman   8/12/2019 10:30 [facility]   8/14/2019 10:30 AM Shital Shen PT [facility]   8/19/2019 12:00 PM Shital Shen PT [facility]   8/20/2019 11:00 AM Urbano Nix MD [facility] JOH[facility]   8/21/2019 12:00 PM ADRIENNE CarmichaelHS [facility]   8/26/2019 12:00 PM Ev Saxena [facility]   8/28/2019 12:00 PM ADRIENNE CarmichaelHS [facility]   12/17/2019  8:30 AM [facility]   12/17/2019 11:40 AM Cayla Cruz NP [address] [facility]

## 2019-08-12 ENCOUNTER — HOSPITAL ENCOUNTER (OUTPATIENT)
Dept: PHYSICAL THERAPY | Age: 74
Discharge: HOME | End: 2019-08-12
Payer: MEDICARE

## 2019-08-12 PROCEDURE — 97110 THERAPEUTIC EXERCISES: CPT

## 2019-08-12 PROCEDURE — 97014 ELECTRIC STIMULATION THERAPY: CPT

## 2019-08-12 NOTE — PROGRESS NOTES
PT DAILY TREATMENT NOTE 10-18    Patient Name: Marie Argueta  Date:2019  : 1945  [x]  Patient  Verified  Payor: Brielle Minercorazonrenee / Plan: [address] [place] [address] HMO / Product Type: Managed Care Medicare /    In time:1033  Out time:1130  Total Treatment Time (min): 62  Visit #: 6 of 8    Medicare/BCBS Only   Total Timed Codes (min):  42 1:1 Treatment Time:  42       Treatment Area: Pain in left shoulder [M25.512]    SUBJECTIVE  Pain Level (0-10 scale): 0  Any medication changes, allergies to medications, adverse drug reactions, diagnosis change, or new procedure performed?: [x] No    [] Yes (see summary sheet for update)  Subjective functional status/changes:   [] No changes reported  Pt reports she had 2/10 pain driving over here, but now \"it is nonexistent. \" Pt states she was able to walk like she used to without issue and \"without fear of falling;\" states she went probably 1.5miles.     OBJECTIVE    Modality rationale: decrease edema, decrease inflammation and decrease pain to improve the patients ability to reach overhead   Min Type Additional Details   15 [x] Estim:  [x]Unatt       [x]IFC  []Premod                        []Other:  [x]w/ice   []w/heat  Position: reclined long sitting   Location: left shoulder    [] Estim: []Att    []TENS instruct  []NMES                    []Other:  []w/US   []w/ice   []w/heat  Position:  Location:    []  Traction: [] Cervical       []Lumbar                       [] Prone          []Supine                       []Intermittent   []Continuous Lbs:  [] before manual  [] after manual    []  Ultrasound: []Continuous   [] Pulsed                           []1MHz   []3MHz W/cm2:  Location:    []  Iontophoresis with dexamethasone         Location: [] Take home patch   [] In clinic    []  Ice     []  heat  []  Ice massage  []  Laser   []  Anodyne Position:  Location:    []  Laser with stim  []  Other:  Position:  Location:    []  Vasopneumatic Device Pressure:       [] lo [] med [] hi   Temperature: [] lo [] med [] hi   [] Skin assessment post-treatment:  [x]intact []redness- no adverse reaction    []redness - adverse reaction:     42 min Therapeutic Exercise:  [x] See flow sheet :   Rationale: increase ROM, increase strength, improve coordination, improve balance and increase proprioception to improve the patients ability to perform ADLs    With   [] TE   [] TA   [] neuro   [] other: Patient Education: [x] Review HEP    [] Progressed/Changed HEP based on:   [] positioning   [] body mechanics   [] transfers   [] heat/ice application    [] other:      Other Objective/Functional Measures:      Pain Level (0-10 scale) post treatment: 0    ASSESSMENT/Changes in Function: Pt continues to demonstrate improvement with shoulder flexion, but remains limited into abduction. Pt was able to eccentrically lower from a fully abducted arm, however, can only lift to approximately 60degrees actively. Pt symptoms increase with AROM but are alleviated with modalities. Patient will continue to benefit from skilled PT services to modify and progress therapeutic interventions, address functional mobility deficits, address ROM deficits, address strength deficits, analyze and address soft tissue restrictions, analyze and cue movement patterns, analyze and modify body mechanics/ergonomics, assess and modify postural abnormalities, address imbalance/dizziness and instruct in home and community integration to attain remaining goals. []  See Plan of Care  []  See progress note/recertification  []  See Discharge Summary         Progress towards goals / Updated goals:   1.   Pt will demonstrate left shoulder AROM flexion to 120 in order to improve ability to perform dressing               FVQGGIU: PROGRESSING = 70              Progressing: AROM in standing during cone reach= 116degrees; AROM without shelf assist = 107  2.   Pt will demonstrate left shoulder AROM functional ER to T2 in order to improve ability to bathe                Recert Status: PROGRESSING = base of occiput              Progressing = T1  3.   Pt will demonstrate left shoulder AROM functional IR to T10 in order to improve ability to don belt and/or undergarments               Recert Status: PROGRESSING = T11              MET = T10  4.   Pt will score at least 59 on FOTO in order to improve overall function, decrease pain, and facilitate return to OF.                Recert Status: PROGRESSING = 32              To be reassessed at Recert  5.   Pt will demonstrate right hip and knee strength minimum of 5-/5 in order to negotiation stairs at OF               Recert Status: PROGRESSING = knee flex/ext = 4+/5, hip flex/IR = 4+/5, hip ER/abd/ext = 4/5              Progressing = able to perform 10x sit to stand without use of UE    PLAN  [x]  Upgrade activities as tolerated     [x]  Continue plan of care  []  Update interventions per flow sheet       []  Discharge due to:_  []  Other:_      Kamala Villarreal PT 8/12/2019  10:34 AM    Future Appointments   Date Time Provider Dyllan Fishman   8/14/2019 10:30 [facility]   8/19/2019 12:00 PM [facility]   8/20/2019 11:00 AM Rick Nguyễn MD [facility]   8/21/2019 12:00 PM [facility]   8/26/2019 12:00 PM Ev Saxena [facility]   8/28/2019 12:00 PM [facility]   12/17/2019  8:30 AM [facility] RM 1 [facility]HR[facility] THE [facility]   12/17/2019 11:40 AM Wade Granados NP [address] [facility]

## 2019-08-14 ENCOUNTER — HOSPITAL ENCOUNTER (OUTPATIENT)
Dept: PHYSICAL THERAPY | Age: 74
Discharge: HOME | End: 2019-08-14
Payer: MEDICARE

## 2019-08-14 PROCEDURE — 97110 THERAPEUTIC EXERCISES: CPT

## 2019-08-14 PROCEDURE — 97014 ELECTRIC STIMULATION THERAPY: CPT

## 2019-08-14 NOTE — PROGRESS NOTES
PT DAILY TREATMENT NOTE 10-18    Patient Name: Bart Noel  Date:2019  : 1945  [x]  Patient  Verified  Payor: Fadumo Agent / Plan: [address] [place] [address] HMO / Product Type: Managed Care Medicare /    In time:1033  Out time:1117  Total Treatment Time (min): 44  Visit #: 8 of 8    Medicare/BCBS Only   Total Timed Codes (min):  29 1:1 Treatment Time:  29       Treatment Area: Pain in left shoulder [M25.512]    SUBJECTIVE  Pain Level (0-10 scale): 1.5  Any medication changes, allergies to medications, adverse drug reactions, diagnosis change, or new procedure performed?: [x] No    [] Yes (see summary sheet for update)  Subjective functional status/changes:   [] No changes reported  See recert    OBJECTIVE    Modality rationale: decrease edema, decrease inflammation and decrease pain to improve the patients ability to reach shoulder height   Min Type Additional Details   15 [x] Estim:  [x]Unatt       [x]IFC  []Premod                        []Other:  [x]w/ice   []w/heat  Position: reclined long sitting  Location: left shoulder    [] Estim: []Att    []TENS instruct  []NMES                    []Other:  []w/US   []w/ice   []w/heat  Position:  Location:    []  Traction: [] Cervical       []Lumbar                       [] Prone          []Supine                       []Intermittent   []Continuous Lbs:  [] before manual  [] after manual    []  Ultrasound: []Continuous   [] Pulsed                           []1MHz   []3MHz W/cm2:  Location:    []  Iontophoresis with dexamethasone         Location: [] Take home patch   [] In clinic    []  Ice     []  heat  []  Ice massage  []  Laser   []  Anodyne Position:  Location:    []  Laser with stim  []  Other:  Position:  Location:    []  Vasopneumatic Device Pressure:       [] lo [] med [] hi   Temperature: [] lo [] med [] hi   [] Skin assessment post-treatment:  [x]intact []redness- no adverse reaction    []redness - adverse reaction:     29 min Therapeutic Exercise:  [x] See flow sheet :   Rationale: increase ROM and increase strength to improve the patients ability to perform ADLs          With   [] TE   [] TA   [] neuro   [] other: Patient Education: [x] Review HEP    [] Progressed/Changed HEP based on:   [] positioning   [] body mechanics   [] transfers   [] heat/ice application    [] other:      Other Objective/Functional Measures:   Left shoulder AROM: supine = 136 deg , standing = 112deg  left shoulder AROM functional ER = T1  left shoulder AROM functional IR = T10  FOTO  = 45   left hip and knee strength: knee flex/ext = 5/5, hip flex/IR/ER = 4+/5, abd/ext = 4/5     Pain Level (0-10 scale) post treatment: 0    ASSESSMENT/Changes in Function: see recert    Patient will continue to benefit from skilled PT services to modify and progress therapeutic interventions, address functional mobility deficits, address ROM deficits, address strength deficits, analyze and address soft tissue restrictions, analyze and cue movement patterns, analyze and modify body mechanics/ergonomics and assess and modify postural abnormalities to attain remaining goals. []  See Plan of Care  [x]  See progress note/recertification  []  See Discharge Summary         Progress towards goals / Updated goals: 1.   Pt will demonstrate left shoulder AROM flexion to 120 in order to improve ability to perform dressing                 Eval = 08GIE   Recert Status: UPDATED: PROGRESSING = 70   PROGRESSING: supine = 136 deg , standing = 112deg  2.   Pt will demonstrate left shoulder AROM functional ER to T2 in order to improve ability to bathe    Eval = unable    Recert Status: PROGRESSING = base of occiput   PROGRESSING = T1  3.   Pt will score at least 59 on FOTO in order to improve overall function, decrease pain, and facilitate return to PLOF.                  Eval = 20    Recert Status: PROGRESSING = 32   PROGRESSING = 45   4.   Pt will demonstrate left hip and knee strength minimum of 5-/5 in order to negotiation stairs at PLOF   Eval = range 8-5/8    Recert Status: PROGRESSING = knee flex/ext = 4+/5, hip flex/IR = 4+/5, hip ER/abd/ext = 4/5   PROGRESSING (updated to reflect left, not right hip): knee flex/ext = 5/5, hip flex/IR/ER = 4+/5, abd/ext = 4/5    PLAN  [x]  Upgrade activities as tolerated     [x]  Continue plan of care  []  Update interventions per flow sheet       []  Discharge due to:_  []  Other:_      Jayson Grewal, PT 8/14/2019  2:37 PM    Future Appointments   Date Time Provider Dyllan Marinellii   8/19/2019 12:00 PM Henrique Rousseau, PT [facility]   8/20/2019 11:00 AM Joie Rankin MD [facility]   8/21/2019 12:00 PM Henrique Rousseau, PT [facility]   8/26/2019 12:00 PM Ev Saxena [facility]   8/28/2019 12:00 PM Henrique Rousseau, PT [facility]   12/17/2019  8:30 AM [facility] RM 1 MIHRUS THE F[facility]   12/17/2019 11:40 AM Pooja Pan NP [address] [facility]

## 2019-08-14 NOTE — PROGRESS NOTES
[facility] [place] [address]  [address], [address] [phone] fax    Continued Plan of Care/ Re-certification for Physical Therapy Services    Patient name: Matias Fierro Start of Care: 2019   Referral source: Walter Desouza : 1945                Medical Diagnosis: Pain in left shoulder [M25.512]  Payor: Betsy Hawthorne / Plan: [address] [place] [address] HMO / Product Type: Managed Care Medicare /  Onset Date:19                Treatment Diagnosis: left hip and shoulder pain   Prior Hospitalization: see medical history Provider#: [id number]   Medications: Verified on Patient summary List    Comorbidities: HTN, recent fall   Prior Level of Function: functionally (I)     Visits from Start of Care: 16    Missed Visits: 1    The Plan of Care and following information is based on the patient's current status:  1.   Pt will demonstrate left shoulder AROM flexion to 120 in order to improve ability to perform dressing                 Eval = 26TTK   Recert Status: UPDATED: PROGRESSING = 70   PROGRESSING: supine = 136 deg , standing = 112deg  2.   Pt will demonstrate left shoulder AROM functional ER to T2 in order to improve ability to bathe    Eval = unable    Recert Status: PROGRESSING = base of occiput   PROGRESSING = T1  3.   Pt will demonstrate left shoulder AROM functional IR to T10 in order to improve ability to don belt and/or undergarments   Eval = unable    Recert Status: PROGRESSING = T11   MET = T10  4.   Pt will score at least 59 on FOTO in order to improve overall function, decrease pain, and facilitate return to PLOF.                  Eval = 20    Recert Status: PROGRESSING = 28   PROGRESSING = 45   5.   Pt will demonstrate left hip and knee strength minimum of 5-/5 in order to negotiation stairs at PLOF   Eval = range 4-6/8    Recert Status: PROGRESSING = knee flex/ext = 4+/5, hip flex/IR = 4+/5, hip ER/abd/ext = 4/5   PROGRESSING (updated to reflect left, not right hip): knee flex/ext = 5/5, hip flex/IR/ER = 4+/5, abd/ext = 4/5    Key functional changes:   Functional Gains: decreased left leg pain, overall pain levels reduced, improved left UE movement, wash dishes, wash and fold laundry, vaccuum, make bed, able to go for morning walk  Functional Deficits: grooming hair, nervous getting out of the tub with increased time; Reaching overhead, drive with left hand, donning sock/hose/tie shoes on left side  % improvement: 60%  Pain   Average: 2/10       Best: 0/10     Worst: 4/10  Patient Goal: \"to be 100% which may not be realistic, but I'll take 90%. I want to be able to do my hair\"        Problems/ barriers to goal attainment: pain levels, anatomical deficits    Problem List: pain affecting function, decrease ROM, decrease strength, impaired gait/ balance, decrease ADL/ functional abilitiies, decrease activity tolerance and decrease flexibility/ joint mobility    Treatment Plan: Therapeutic exercise, Therapeutic activities, Neuromuscular re-education, Physical agent/modality, Gait/balance training, Manual therapy, Patient education, Self Care training, Functional mobility training, Home safety training and Stair training     Goals for this certification period to be accomplished in 4 weeks: 1.   Pt will demonstrate left shoulder AROM flexion to 120 in order to improve ability to perform dressing                 Eval = 34DDG   Recert Status: UPDATED: PROGRESSING = 70   PROGRESSING: supine = 136 deg , standing = 112deg  2.   Pt will demonstrate left shoulder AROM functional ER to T2 in order to improve ability to bathe    Eval = unable    Recert Status: PROGRESSING = base of occiput   PROGRESSING = T1  3.   Pt will score at least 59 on FOTO in order to improve overall function, decrease pain, and facilitate return to PLOF.                  Eval = 20    Recert Status: PROGRESSING = 32   PROGRESSING = 45   4.   Pt will demonstrate left hip and knee strength minimum of 5-/5 in order to negotiation stairs at PLOF   Eval = range 5-5/4    Recert Status: PROGRESSING = knee flex/ext = 4+/5, hip flex/IR = 4+/5, hip ER/abd/ext = 4/5   PROGRESSING (updated to reflect left, not right hip): knee flex/ext = 5/5, hip flex/IR/ER = 4+/5, abd/ext = 4/5    Frequency / Duration: Patient to be seen 2 times per week for 8 treatments:    Assessment / Recommendations:Ms. Gladys Long has been a pleasure to treat and reports 60% improvement since initial evaluation. Pt reports improved ability to perform ADLs such as making her bed, washing and folding laundry, and taking her morning walk. She continues to struggle with lifting, doing her hair, and is nervous with weight bearing (ie- getting up out of her bath tub). Pt's AROM is improving into flexion - while her pain levels are reduce she is still limited by pain and strength. We will continue to see Ms. Gladys Long 2x/week for the next 4 weeks in order to continue to progress AROM and maximize functional potential.     Certification Period: 8/14/19-9/12/19    Abigail Shone, PT 8/14/2019 10:34 AM    ________________________________________________________________________  I certify that the above Therapy Services are being furnished while the patient is under my care. I agree with the treatment plan and certify that this therapy is necessary. [] I have read the above and request that my patient continue as recommended.   [] I have read the above report and request that my patient continue therapy with the following changes/special instructions: _____________________________________________  [] I have read the above report and request that my patient be discharged from therapy    Physician's Signature:____________Date:_________TIME:________    ** Signature, Date and Time must be completed for valid certification **    Please sign and return to [facility] - [address] [address]  [phone] fax

## 2019-08-19 ENCOUNTER — HOSPITAL ENCOUNTER (OUTPATIENT)
Dept: PHYSICAL THERAPY | Age: 74
Discharge: HOME | End: 2019-08-19
Payer: MEDICARE

## 2019-08-19 PROCEDURE — 97014 ELECTRIC STIMULATION THERAPY: CPT

## 2019-08-19 PROCEDURE — 97112 NEUROMUSCULAR REEDUCATION: CPT

## 2019-08-19 PROCEDURE — 97110 THERAPEUTIC EXERCISES: CPT

## 2019-08-19 PROCEDURE — 97140 MANUAL THERAPY 1/> REGIONS: CPT

## 2019-08-19 NOTE — PROGRESS NOTES
PT DAILY TREATMENT NOTE 10-18    Patient Name: Myrna Cadena  Date:2019  : 1945  [x]  Patient  Verified  Payor: Cathy Ulloa / Plan: [address] [place] [address] HMO / Product Type: Managed Care Medicare /    In time:1200  Out time:101  Total Treatment Time (min): 61  Visit #: 1 of 8    Medicare/BCBS Only   Total Timed Codes (min):  46 1:1 Treatment Time:  46       Treatment Area: Pain in left shoulder [M25.512]    SUBJECTIVE  Pain Level (0-10 scale): 0  Any medication changes, allergies to medications, adverse drug reactions, diagnosis change, or new procedure performed?: [x] No    [] Yes (see summary sheet for update)  Subjective functional status/changes:   [] No changes reported  Pt reports she has been able to engage her left hand a little more during driving.      OBJECTIVE    Modality rationale: decrease inflammation and decrease pain to improve the patients ability to reach overhead    Min Type Additional Details   15 [x] Estim:  [x]Unatt       [x]IFC  []Premod                        []Other:  [x]w/ice   []w/heat  Position: supine  Location: left shoulder    [] Estim: []Att    []TENS instruct  []NMES                    []Other:  []w/US   []w/ice   []w/heat  Position:  Location:    []  Traction: [] Cervical       []Lumbar                       [] Prone          []Supine                       []Intermittent   []Continuous Lbs:  [] before manual  [] after manual    []  Ultrasound: []Continuous   [] Pulsed                           []1MHz   []3MHz W/cm2:  Location:    []  Iontophoresis with dexamethasone         Location: [] Take home patch   [] In clinic    []  Ice     []  heat  []  Ice massage  []  Laser   []  Anodyne Position:  Location:    []  Laser with stim  []  Other:  Position:  Location:    []  Vasopneumatic Device Pressure:       [] lo [] med [] hi   Temperature: [] lo [] med [] hi   [] Skin assessment post-treatment:  [x]intact []redness- no adverse reaction    []redness - adverse reaction:      28 min Therapeutic Exercise:  [x] See flow sheet :   Rationale: increase ROM and increase strength to improve the patients ability to perform ADLs     10 min Neuromuscular Re-education:  [x]  See flow sheet :   Rationale: increase strength and increase proprioception  to improve the patients ability to reach overhead with reduced upper trap substitutions    8 min Manual Therapy:  including manual PNF resisted scapular patterns, STM with trigger point release to left upper trap and latissimus dorsi; MET scapular mobilizations   Rationale: decrease pain, increase ROM and increase tissue extensibility to improve ability to groom hair          With   [] TE   [] TA   [] neuro   [] other: Patient Education: [x] Review HEP    [] Progressed/Changed HEP based on:   [] positioning   [] body mechanics   [] transfers   [] heat/ice application    [] other:      Other Objective/Functional Measures:      Pain Level (0-10 scale) post treatment: 0    ASSESSMENT/Changes in Function: Pt continues to progress towards her stated goals. She was able to tolerate addition of abduction reaching with cones and prone shoulder flexion. She has increased pain during all AROM activities on the left shoulder, but is able to tolerate given exercises. Pt is highly motivated and demonstrates excellent effort. Pain is reduced with manual and modalities. Patient will continue to benefit from skilled PT services to modify and progress therapeutic interventions, address functional mobility deficits, address ROM deficits, address strength deficits, analyze and address soft tissue restrictions, analyze and cue movement patterns, analyze and modify body mechanics/ergonomics, assess and modify postural abnormalities and address imbalance/dizziness to attain remaining goals. []  See Plan of Care  []  See progress note/recertification  []  See Discharge Summary         Progress towards goals / Updated goals:   1.   Pt will demonstrate left shoulder AROM flexion to 120 in order to improve ability to perform dressing              Eval = 38deg              Recert Status: UPDATED: PROGRESSING = 70              Recert 5/23: PROGRESSING: supine = 136 deg , standing = 112deg  2.   Pt will demonstrate left shoulder AROM functional ER to T2 in order to improve ability to bathe               Eval = unable                Recert Status: PROGRESSING = base of occiput              Recert 1/87: PROGRESSING = T1  3.   Pt will score at least 59 on FOTO in order to improve overall function, decrease pain, and facilitate return to PLOF.               Eval = 20               Recert Status: PROGRESSING = 32              Recert 3/12: PROGRESSING = 45   4.   Pt will demonstrate left hip and knee strength minimum of 5-/5 in order to negotiation stairs at PLOF              Eval = range 3-5/5                    Recert Status: PROGRESSING = knee flex/ext = 4+/5, hip flex/IR = 4+/5, hip ER/abd/ext = 4/5              Recert 3/26: PROGRESSING (updated to reflect left, not right hip): knee flex/ext = 5/5, hip flex/IR/ER = 4+/5, abd/ext = 4/5    PLAN  [x]  Upgrade activities as tolerated     [x]  Continue plan of care  []  Update interventions per flow sheet       []  Discharge due to:_  []  Other:_      Julianna Rojo PT 8/19/2019  11:59 AM    Future Appointments   Date Time Provider Dyllan Fishman   8/19/2019 12:00 PM Shital Shen PT [facility] [address]   8/20/2019 11:00 AM Urbano Nix MD [facility] JOHANA SCHED   8/21/2019 12:00 PM Shital Shen PT [facility] [address]   8/26/2019 12:00 PM Ev Saxena [address]   8/28/2019 12:00 PM Shital Shen PT [facility] [address]   12/17/2019  8:30 AM THE [facility] RM [address] THE [facility]   12/17/2019 11:40 AM Cayla Cruz NP [address] [facility]

## 2019-08-20 ENCOUNTER — OFFICE VISIT (OUTPATIENT)
Dept: ORTHOPEDIC SURGERY | Facility: CLINIC | Age: 74
End: 2019-08-20

## 2019-08-20 VITALS
TEMPERATURE: 96.4 F | OXYGEN SATURATION: 98 % | DIASTOLIC BLOOD PRESSURE: 89 MMHG | HEART RATE: 72 BPM | BODY MASS INDEX: 25.27 KG/M2 | HEIGHT: 67 IN | RESPIRATION RATE: 16 BRPM | WEIGHT: 161 LBS | SYSTOLIC BLOOD PRESSURE: 169 MMHG

## 2019-08-20 DIAGNOSIS — S46.012D TRAUMATIC COMPLETE TEAR OF LEFT ROTATOR CUFF, SUBSEQUENT ENCOUNTER: Primary | ICD-10-CM

## 2019-08-20 NOTE — PROGRESS NOTES
Patient: Mary Kate Haider                MRN: [id number]       SSN: [id number]  YOB: 1945        AGE: 68 y.o. SEX: female  Body mass index is 25.22 kg/m². PCP: Jessie Jones MD  08/20/19    Chief Complaint: Left shoulder follow up    HISTORY OF PRESENT ILLNESS:  Milagros Bojorquez returns today for her left shoulder. She has been in therapy. She reports improvement in her symptoms and pain. She is happy with the progress she has made. She is not having any difficulty sleeping. Past Medical History:   Diagnosis Date    Hypertension        Family History   Problem Relation Age of Onset    Breast Cancer Mother 59       Current Outpatient Medications   Medication Sig Dispense Refill    mycophenolate mofetil (CELLCEPT) 250 mg capsule take 1 capsule by mouth twice a day 180 Cap 3    calcium-cholecalciferol, d3, (CALCIUM 600 + D) 600-125 mg-unit tab Take  by mouth.  diclofenac (VOLTAREN) 1 % gel Apply 4 g to affected area two (2) times a day. Apply to affected area as directed. 100 g 1    hydroCHLOROthiazide (HYDRODIURIL) 12.5 mg tablet Take 12.5 mg by mouth daily. No Known Allergies    History reviewed. No pertinent surgical history.     Social History     Socioeconomic History    Marital status:      Spouse name: Not on file    Number of children: Not on file    Years of education: Not on file    Highest education level: Not on file   Occupational History    Not on file   Social Needs    Financial resource strain: Not on file    Food insecurity:     Worry: Not on file     Inability: Not on file    Transportation needs:     Medical: Not on file     Non-medical: Not on file   Tobacco Use    Smoking status: Former Smoker    Smokeless tobacco: Never Used   Substance and Sexual Activity    Alcohol use: No    Drug use: Never    Sexual activity: Not on file   Lifestyle    Physical activity:     Days per week: Not on file     Minutes per session: Not on file    Stress: Not on file   Relationships    Social connections:     Talks on phone: Not on file     Gets together: Not on file     Attends Baptism service: Not on file     Active member of club or organization: Not on file     Attends meetings of clubs or organizations: Not on file     Relationship status: Not on file    Intimate partner violence:     Fear of current or ex partner: Not on file     Emotionally abused: Not on file     Physically abused: Not on file     Forced sexual activity: Not on file   Other Topics Concern    Not on file   Social History Narrative    Not on file       REVIEW OF SYSTEMS:      No changes from previous review of systems unless noted. PHYSICAL EXAMINATION:  Visit Vitals  /89 (BP 1 Location: Left arm, BP Patient Position: Sitting)   Pulse 72   Temp 96.4 °F (35.8 °C) (Oral)   Resp 16   Ht 5' 7\" (1.702 m)   Wt 161 lb (73 kg)   SpO2 98%   BMI 25.22 kg/m²     Body mass index is 25.22 kg/m². GENERAL: Alert and oriented x3, in no acute distress. HEENT: Normocephalic, atraumatic. RESP: Non labored breathing. SKIN: No rashes or lesions noted. PHYSICAL EXAM:  Physical exam of the left shoulder has limited forward flexion to about 120 degrees. She has pain and weakness with supraspinatus rotator cuff testing. Mild pain with infraspinatus and belly press, but no weakness. Mild pain with cross body adduction. Tender to palpation over the proximal humerus. Neurovascularly intact distally. ASSESSMENT AND PLAN:   Christian Jones continues to have problems with her left shoulder, although she says she is getting better in terms of her symptoms and pain. She would like to continue to treat this conservatively. I did discuss with her that should her symptoms worsen or she cannot live with these symptoms, surgery is an option in the form of a rotator cuff repair in the near future. I will see her back in about two months to check on her.               Electronically signed by: Paras Henderson MD

## 2019-08-20 NOTE — PROGRESS NOTES
1. Have you been to the ER, urgent care clinic since your last visit? Hospitalized since your last visit? NO    2. Have you seen or consulted any other health care providers outside of the [address] [place] [address] since your last visit? Include any pap smears or colon screening.  NO

## 2019-08-21 ENCOUNTER — HOSPITAL ENCOUNTER (OUTPATIENT)
Dept: PHYSICAL THERAPY | Age: 74
Discharge: HOME | End: 2019-08-21
Payer: MEDICARE

## 2019-08-21 PROCEDURE — 97110 THERAPEUTIC EXERCISES: CPT

## 2019-08-21 PROCEDURE — 97014 ELECTRIC STIMULATION THERAPY: CPT

## 2019-08-21 NOTE — PROGRESS NOTES
PT DAILY TREATMENT NOTE 10-18    Patient Name: Blossom Dickerson  Date:2019  : 1945  [x]  Patient  Verified  Payor: Mallory Kay / Plan: [address] [place] [address] HMO / Product Type: Managed Care Medicare /    In time:1202  Out time:1259  Total Treatment Time (min): 62  Visit #: 2 of 8    Medicare/BCBS Only   Total Timed Codes (min):  42 1:1 Treatment Time:  42       Treatment Area: Pain in left shoulder [M25.512]    SUBJECTIVE  Pain Level (0-10 scale): 0  Any medication changes, allergies to medications, adverse drug reactions, diagnosis change, or new procedure performed?: [x] No    [] Yes (see summary sheet for update)  Subjective functional status/changes:   [] No changes reported  \"Yesterday I was driving with just my left hand. \"    OBJECTIVE    Modality rationale: decrease pain and increase tissue extensibility to improve the patients ability to reach to shoulder height    Min Type Additional Details   15 [x] Estim:  [x]Unatt       [x]IFC  []Premod                        []Other:  []w/ice   [x]w/heat  Position: reclined long sitting   Location: left shoulder     [] Estim: []Att    []TENS instruct  []NMES                    []Other:  []w/US   []w/ice   []w/heat  Position:  Location:    []  Traction: [] Cervical       []Lumbar                       [] Prone          []Supine                       []Intermittent   []Continuous Lbs:  [] before manual  [] after manual    []  Ultrasound: []Continuous   [] Pulsed                           []1MHz   []3MHz W/cm2:  Location:    []  Iontophoresis with dexamethasone         Location: [] Take home patch   [] In clinic    []  Ice     []  heat  []  Ice massage  []  Laser   []  Anodyne Position:  Location:    []  Laser with stim  []  Other:  Position:  Location:    []  Vasopneumatic Device Pressure:       [] lo [] med [] hi   Temperature: [] lo [] med [] hi   [] Skin assessment post-treatment:  [x]intact []redness- no adverse reaction    []redness - adverse reaction:     42 min Therapeutic Exercise:  [x] See flow sheet :   Rationale: increase ROM, increase strength, improve balance and increase proprioception to improve the patients ability to perform ADLs          With   [] TE   [] TA   [] neuro   [] other: Patient Education: [x] Review HEP    [] Progressed/Changed HEP based on:   [] positioning   [] body mechanics   [] transfers   [] heat/ice application    [] other:      Other Objective/Functional Measures:   Standing left shoulder flexion = 113deg  Left shoulder functional ER = T2     Pain Level (0-10 scale) post treatment: 0    ASSESSMENT/Changes in Function: Pt was able to perform eccentric lowering of the left arm x3 today prior to returning to fully AAROM due to high pain levels. Pt has achieved goal #2, however, will continue to monitor for consistency. Pt pain continues to be eliminated with modalities     Patient will continue to benefit from skilled PT services to modify and progress therapeutic interventions, address functional mobility deficits, address ROM deficits, address strength deficits, analyze and address soft tissue restrictions, analyze and cue movement patterns, analyze and modify body mechanics/ergonomics, assess and modify postural abnormalities, address imbalance/dizziness and instruct in home and community integration to attain remaining goals. []  See Plan of Care  []  See progress note/recertification  []  See Discharge Summary         Progress towards goals / Updated goals:   1.   Pt will demonstrate left shoulder AROM flexion to 120 in order to improve ability to perform dressing              Eval = 38deg              Recert Status: UPDATED: PROGRESSING = 70              Recert : PROGRESSING: supine = 136 deg , standing = 112deg   Progressindeg standing  2.   Pt will demonstrate left shoulder AROM functional ER to T2 in order to improve ability to bathe               Eval = unable                Recert Status: PROGRESSING = base of occiput              Recert 5/45: PROGRESSING = T1   Progressing = T2, continue to monitor for consistency  3.   Pt will score at least 59 on FOTO in order to improve overall function, decrease pain, and facilitate return to PLOF.               Eval = 20               Recert Status: PROGRESSING = 32              Recert 6/63: PROGRESSING = 45   To be assessed at recert   4.   Pt will demonstrate left hip and knee strength minimum of 5-/5 in order to negotiation stairs at PLOF              Eval = range 3-5/5                    Recert Status: PROGRESSING = knee flex/ext = 4+/5, hip flex/IR = 4+/5, hip ER/abd/ext = 4/5              Recert 7/11: PROGRESSING (updated to reflect left, not right hip): knee flex/ext = 5/5, hip flex/IR/ER = 4+/5, abd/ext = 4/5   Progressing - pt increasing band resistance on clamshells and abduction     PLAN  [x]  Upgrade activities as tolerated     [x]  Continue plan of care  []  Update interventions per flow sheet       []  Discharge due to:_  []  Other:_      Kenzie Gaytan, PT 8/21/2019  12:03 PM    Future Appointments   Date Time Provider Dyllan Fishman   8/26/2019 12:00 PM [facility]   8/28/2019 12:00 PM Vicente Alba, PT [id number]   10/22/2019 10:20 AM Jose C Garcia MD [facility]   12/17/2019  8:30 [facility]   12/17/2019 11:40 AM Blair Bumpers, NP [address] [facility]

## 2019-08-26 ENCOUNTER — HOSPITAL ENCOUNTER (OUTPATIENT)
Dept: PHYSICAL THERAPY | Age: 74
Discharge: HOME | End: 2019-08-26
Payer: MEDICARE

## 2019-08-26 PROCEDURE — 97140 MANUAL THERAPY 1/> REGIONS: CPT

## 2019-08-26 PROCEDURE — 97110 THERAPEUTIC EXERCISES: CPT

## 2019-08-26 PROCEDURE — 97014 ELECTRIC STIMULATION THERAPY: CPT

## 2019-08-26 NOTE — PROGRESS NOTES
PT DAILY TREATMENT NOTE 10-18    Patient Name: Rodo Liang  Date:2019  : 1945  [x]  Patient  Verified  Payor: Mary James / Plan: [address] [place] [address] HMO / Product Type: Managed Care Medicare /    In YORV:3639  Out time:1256  Total Treatment Time (min): 61  Visit #: 3 of 8    Medicare/BCBS Only   Total Timed Codes (min):  44 1:1 Treatment Time:  44       Treatment Area: Pain in left shoulder [M25.512]    SUBJECTIVE  Pain Level (0-10 scale): 1  Any medication changes, allergies to medications, adverse drug reactions, diagnosis change, or new procedure performed?: [x] No    [] Yes (see summary sheet for update)  Subjective functional status/changes:   [] No changes reported  \"Today is a good day. I was able to utilize my arm for things over the weekend that I haven't been doing yet; I may have over done it some. \"    OBJECTIVE    Modality rationale: decrease inflammation and decrease pain to improve the patients ability to reach to shoulder height   Min Type Additional Details   15 [x] Estim:  [x]Unatt       [x]IFC  []Premod                        []Other:  []w/ice   []w/heat  Position: reclined long sitting  Location: left shoulder     [] Estim: []Att    []TENS instruct  []NMES                    []Other:  []w/US   []w/ice   []w/heat  Position:  Location:    []  Traction: [] Cervical       []Lumbar                       [] Prone          []Supine                       []Intermittent   []Continuous Lbs:  [] before manual  [] after manual    []  Ultrasound: []Continuous   [] Pulsed                           []1MHz   []3MHz W/cm2:  Location:    []  Iontophoresis with dexamethasone         Location: [] Take home patch   [] In clinic    []  Ice     []  heat  []  Ice massage  []  Laser   []  Anodyne Position:  Location:    []  Laser with stim  []  Other:  Position:  Location:    []  Vasopneumatic Device Pressure:       [] lo [] med [] hi   Temperature: [] lo [] med [] hi   [] Skin assessment post-treatment:  [x]intact []redness- no adverse reaction    []redness - adverse reaction:     36 min Therapeutic Exercise:  [x] See flow sheet :   Rationale: increase ROM, increase strength and increase proprioception to improve the patients ability to perform ADLs    8 min Manual Therapy:  PNF rhythmic stabilization, scapular MET, soft tissue mobilization to left upper trap and supraspinatus   Rationale: decrease pain, increase ROM, increase tissue extensibility and increase postural awareness to reach overhead          With   [] TE   [] TA   [] neuro   [] other: Patient Education: [x] Review HEP    [] Progressed/Changed HEP based on:   [] positioning   [] body mechanics   [] transfers   [] heat/ice application    [] other:      Other Objective/Functional Measures:      Pain Level (0-10 scale) post treatment: 0    ASSESSMENT/Changes in Function: Pt continues to progress towards stated goals. Pt remains challenged with reaching over shoulder height, but is making progress and is motivated showing excellent effort throughout treatment. Patient will continue to benefit from skilled PT services to modify and progress therapeutic interventions, address functional mobility deficits, address ROM deficits, address strength deficits, analyze and address soft tissue restrictions, analyze and cue movement patterns, analyze and modify body mechanics/ergonomics, assess and modify postural abnormalities and address imbalance/dizziness to attain remaining goals. []  See Plan of Care  []  See progress note/recertification  []  See Discharge Summary         Progress towards goals / Updated goals:   1.   Pt will demonstrate left shoulder AROM flexion to 120 in order to improve ability to perform dressing              Eval = 38deg              Recert Status: UPDATED: PROGRESSING = 70              Recert : [id number]: supine = 136 deg , standing = 112deg              Progressindeg standing  2.   Pt will demonstrate left shoulder AROM functional ER to T2 in order to improve ability to bathe               Eval = unable                Recert Status: PROGRESSING = base of occiput              Recert 4/29: [id number] = T1              Progressing = T2, continue to monitor for consistency  3.   Pt will score at least 59 on FOTO in order to improve overall function, decrease pain, and facilitate return to PLOF.               Eval = 20               Recert Status: PROGRESSING = 32              Recert 2/35: [id number] = 45              To be assessed at recert   4.   Pt will demonstrate left hip and knee strength minimum of 5-/5 in order to negotiation stairs at PLOF              Eval = range 3-5/5                    Recert Status: PROGRESSING = knee flex/ext = 4+/5, hip flex/IR = 4+/5, hip ER/abd/ext = 4/5              Recert 8/37: [id number] (NEYBGAH to reflect left, not right hip): knee flex/ext = 5/5, hip flex/IR/ER = 4+/5, abd/ext = 4/5              Progressing - pt increasing band resistance on clamshells and abduction     PLAN  [x]  Upgrade activities as tolerated     [x]  Continue plan of care  []  Update interventions per flow sheet       []  Discharge due to:_  []  Other:_      Johann Farias, PT 8/26/2019  12:03 PM    Future Appointments   Date Time Provider Dyllan Fishman   8/28/2019 12:00 PM [facility]   9/4/2019  3:00 PM [facility]   9/5/2019 12:00 PM Ev Saxena [address] [facility]   9/11/2019 10:00 AM [facility]   10/22/2019 10:20 AM Mesfin Schaefer MD [facility] JOHANA SCHED   12/17/2019  8:30 AM THE [facility] RM 1 [address] THE [facility]   12/17/2019 11:40 AM Mya Feliciano NP [address] [facility]

## 2019-08-28 ENCOUNTER — HOSPITAL ENCOUNTER (OUTPATIENT)
Dept: PHYSICAL THERAPY | Age: 74
Discharge: HOME | End: 2019-08-28
Payer: MEDICARE

## 2019-08-28 PROCEDURE — 97140 MANUAL THERAPY 1/> REGIONS: CPT

## 2019-08-28 PROCEDURE — 97110 THERAPEUTIC EXERCISES: CPT

## 2019-08-28 PROCEDURE — 97014 ELECTRIC STIMULATION THERAPY: CPT

## 2019-08-28 NOTE — PROGRESS NOTES
PT DAILY TREATMENT NOTE 10-18     Patient Name: Jack Lazo  Date:2019  : 1945  [x]  Patient  Verified  Payor: Saurabh Duncan / Plan: [address] [place] [address] HMO / Product Type: Managed Care Medicare /    In time:1202  Out time:104  Total Treatment Time (min): 62  Visit #: 4 of 8    Medicare/BCBS Only   Total Timed Codes (min):  47 1:1 Treatment Time:  52       Treatment Area: Pain in left shoulder [M25.512]    SUBJECTIVE  Pain Level (0-10 scale): 0  Any medication changes, allergies to medications, adverse drug reactions, diagnosis change, or new procedure performed?: [x] No    [] Yes (see summary sheet for update)  Subjective functional status/changes:   [] No changes reported  Pt reports no pain today.      OBJECTIVE    Modality rationale: decrease pain and increase tissue extensibility to improve the patients ability to reach overhead   Min Type Additional Details   15 [x] Estim:  [x]Unatt       [x]IFC  []Premod                        []Other:  []w/ice   [x]w/heat  Position: supine  Location: left shoulder    [] Estim: []Att    []TENS instruct  []NMES                    []Other:  []w/US   []w/ice   []w/heat  Position:  Location:    []  Traction: [] Cervical       []Lumbar                       [] Prone          []Supine                       []Intermittent   []Continuous Lbs:  [] before manual  [] after manual    []  Ultrasound: []Continuous   [] Pulsed                           []1MHz   []3MHz W/cm2:  Location:    []  Iontophoresis with dexamethasone         Location: [] Take home patch   [] In clinic    []  Ice     []  heat  []  Ice massage  []  Laser   []  Anodyne Position:  Location:    []  Laser with stim  []  Other:  Position:  Location:    []  Vasopneumatic Device Pressure:       [] lo [] med [] hi   Temperature: [] lo [] med [] hi   [] Skin assessment post-treatment:  [x]intact []redness- no adverse reaction    []redness - adverse reaction:     37 min Therapeutic Exercise:  [x] See flow sheet :   Rationale: increase ROM and increase strength to improve the patients ability to perform ADLs    10 min Manual Therapy:  Soft tissue mobilization and trigger point release to left latissimus dorsi and subscapularis   Rationale: decrease pain, increase ROM and increase tissue extensibility to reach overhead          With   [] TE   [] TA   [] neuro   [] other: Patient Education: [x] Review HEP    [] Progressed/Changed HEP based on:   [] positioning   [] body mechanics   [] transfers   [] heat/ice application    [] other:      Other Objective/Functional Measures:   Standing left shoulder flexion = 113degrees; functional ER = T2     Pain Level (0-10 scale) post treatment: 0    ASSESSMENT/Changes in Function: Pt continues to report improvement. Pt was able to increased sidelying abduction AROM after performance of manual therapy - will add a latissimus dorsi stretch next visit to address tightness. Pt has met goal for functional ER. Patient will continue to benefit from skilled PT services to modify and progress therapeutic interventions, address functional mobility deficits, address ROM deficits, address strength deficits, analyze and address soft tissue restrictions, analyze and cue movement patterns, analyze and modify body mechanics/ergonomics and assess and modify postural abnormalities to attain remaining goals. []  See Plan of Care  []  See progress note/recertification  []  See Discharge Summary         Progress towards goals / Updated goals:   1.   Pt will demonstrate left shoulder AROM flexion to 120 in order to improve ability to perform dressing              Eval = 38deg              Recert Status: UPDATED: PROGRESSING = 70              Recert : [id number]: supine = 136 deg , standing = 112deg              Progressindeg standing  2.   Pt will demonstrate left shoulder AROM functional ER to T2 in order to improve ability to bathe               Eval = unable                Recert Status: PROGRESSING = base of occiput              Recert 3/95: FVRMZPIBOOB = T1              MET = T2  3.   Pt will score at least 59 on FOTO in order to improve overall function, decrease pain, and facilitate return to PLOF.               Eval = 20               Recert Status: PROGRESSING = 32              Recert 5/27: LHBRVUIZZQY = 45              To be assessed at recert   4.   Pt will demonstrate left hip and knee strength minimum of 5-/5 in order to negotiation stairs at PLOF              Eval = range 3-5/5                    Recert Status: PROGRESSING = knee flex/ext = 4+/5, hip flex/IR = 4+/5, hip ER/abd/ext = 4/5              Recert 0/32: OLUIEEUIJVH (ZNYMQHK to reflect left, not right hip): knee flex/ext = 5/5, hip flex/IR/ER = 4+/5, abd/ext = 4/5              Progressing - pt increasing band resistance on clamshells and abduction     PLAN  [x]  Upgrade activities as tolerated     [x]  Continue plan of care  []  Update interventions per flow sheet       []  Discharge due to:_  []  Other:_      Db Chin, PT 8/28/2019  12:05 PM    Future Appointments   Date Time Provider Dyllan Fishman   9/4/2019  3:00 PM Ev Saxena [facility]   9/5/2019 12:00 PM [facility]   9/11/2019 10:00 AM ADRIENNE Smith [facility]   10/22/2019 10:20 AM [facility]   12/17/2019  8:30 AM [facility] RM 1 MIHRUS THE [facility]   12/17/2019 11:40 AM Will Walton NP [address] [facility]

## 2019-09-04 ENCOUNTER — HOSPITAL ENCOUNTER (OUTPATIENT)
Dept: PHYSICAL THERAPY | Age: 74
Discharge: HOME | End: 2019-09-04
Payer: MEDICARE

## 2019-09-04 PROCEDURE — 97014 ELECTRIC STIMULATION THERAPY: CPT

## 2019-09-04 PROCEDURE — 97110 THERAPEUTIC EXERCISES: CPT

## 2019-09-04 NOTE — PROGRESS NOTES
PT DAILY TREATMENT NOTE 10-18    Patient Name: Dean See  Date:2019  : 1945  [x]  Patient  Verified  Payor: Erika Footeles / Plan: [address] [place] [address] HMO / Product Type: Managed Care Medicare /    In time:231  Out time:336  Total Treatment Time (min): 72  Visit #: 5 of 8    Medicare/BCBS Only   Total Timed Codes (min):  50 1:1 Treatment Time:  50       Treatment Area: Pain in left shoulder [M25.512]    SUBJECTIVE  Pain Level (0-10 scale): 0  Any medication changes, allergies to medications, adverse drug reactions, diagnosis change, or new procedure performed?: [x] No    [] Yes (see summary sheet for update)  Subjective functional status/changes:   [] No changes reported  Pt reports continued no pain at the start of treatment and has been able to be active in her social life.      OBJECTIVE    Modality rationale: decrease pain and increase tissue extensibility to improve the patients ability to perform ADLs   Min Type Additional Details   15 [x] Estim:  [x]Unatt       [x]IFC  []Premod                        []Other:  []w/ice   [x]w/heat  Position: right sidelying  Location: left shoulder     [] Estim: []Att    []TENS instruct  []NMES                    []Other:  []w/US   []w/ice   []w/heat  Position:  Location:    []  Traction: [] Cervical       []Lumbar                       [] Prone          []Supine                       []Intermittent   []Continuous Lbs:  [] before manual  [] after manual    []  Ultrasound: []Continuous   [] Pulsed                           []1MHz   []3MHz W/cm2:  Location:    []  Iontophoresis with dexamethasone         Location: [] Take home patch   [] In clinic    []  Ice     []  heat  []  Ice massage  []  Laser   []  Anodyne Position:  Location:    []  Laser with stim  []  Other:  Position:  Location:    []  Vasopneumatic Device Pressure:       [] lo [] med [] hi   Temperature: [] lo [] med [] hi   [] Skin assessment post-treatment:  [x]intact []redness- no adverse reaction    []redness - adverse reaction:     50 min Therapeutic Exercise:  [x] See flow sheet : including 5minutes soft tissue mobilization to decrease trigger points in latissimus dorsi    Rationale: increase ROM and increase strength to improve the patients ability to reach overhead     With   [] TE   [] TA   [] neuro   [] other: Patient Education: [x] Review HEP    [] Progressed/Changed HEP based on:   [] positioning   [] body mechanics   [] transfers   [] heat/ice application    [] other:      Other Objective/Functional Measures: standing left shoulder flexion AROM = 120degrees; left hip strength: flex and IR = 4+/5, abd/ER = 4/5     Pain Level (0-10 scale) post treatment: 0    ASSESSMENT/Changes in Function: Pt continues to progress with her shoulder AROM, reaching 120degrees flexion today; will continue to monitor for consistency. While pt was able to reach to goal levels, AROM still causes a significant amount of pain. Pt hip strength today was painful and has not made significant progress - will re-address next visit. Patient will continue to benefit from skilled PT services to modify and progress therapeutic interventions, address functional mobility deficits, address ROM deficits, address strength deficits, analyze and address soft tissue restrictions, analyze and cue movement patterns, analyze and modify body mechanics/ergonomics and assess and modify postural abnormalities to attain remaining goals. []  See Plan of Care  []  See progress note/recertification  []  See Discharge Summary         Progress towards goals / Updated goals:   1.   Pt will demonstrate left shoulder AROM flexion to 120 in order to improve ability to perform dressing              Eval = 38deg              Recert Status: UPDATED: PROGRESSING = 70              Recert : UCLGFHIQWAY: supine = 136 deg , standing = 112deg              Progressindegrees, continue to monitor for consistency   2.   Pt will demonstrate left shoulder AROM functional ER to T2 in order to improve ability to bathe               Eval = unable                Recert Status: PROGRESSING = base of occiput              Recert 7/29: FOIFVTKJUXP = T1              MET = T2  3.   Pt will score at least 59 on FOTO in order to improve overall function, decrease pain, and facilitate return to PLOF.               Eval = 20               Recert Status: PROGRESSING = 32              Recert 6/65: SQEJXWRESZY = 45              To be assessed at recert   4.   Pt will demonstrate left hip and knee strength minimum of 5-/5 in order to negotiation stairs at PLOF              Eval = range 3-5/5                    Recert Status: PROGRESSING = knee flex/ext = 4+/5, hip flex/IR = 4+/5, hip ER/abd/ext = 4/5              Recert 4/28: JHEWVPWZFDJ (WPEMCGL to reflect left, not right hip): knee flex/ext = 5/5, hip flex/IR/ER = 4+/5, abd/ext = 4/5              Met at knee; progressing hip: flex and IR = 4+/5, abd/ER = 4/5    PLAN  [x]  Upgrade activities as tolerated     [x]  Continue plan of care  []  Update interventions per flow sheet       []  Discharge due to:_  []  Other:_      Kelechi Torres, PT 9/4/2019  2:24 PM    Future Appointments   Date Time Provider Dyllan Fishman   9/4/2019  2:30 PM [facility]   9/5/2019 12:00 PM [facility]   9/11/2019 10:00 AM [facility]   10/22/2019 10:20 AM [facility]pson [facility]   12/17/2019  8:30 AM THE F[facility] RM 1 MIHRUS THE [facility]   12/17/2019 11:40 AM Nicanor Gan NP [address] [facility]

## 2019-09-05 ENCOUNTER — HOSPITAL ENCOUNTER (OUTPATIENT)
Dept: PHYSICAL THERAPY | Age: 74
Discharge: HOME | End: 2019-09-05
Payer: MEDICARE

## 2019-09-05 PROCEDURE — 97140 MANUAL THERAPY 1/> REGIONS: CPT

## 2019-09-05 PROCEDURE — 97530 THERAPEUTIC ACTIVITIES: CPT

## 2019-09-05 PROCEDURE — 97014 ELECTRIC STIMULATION THERAPY: CPT

## 2019-09-05 PROCEDURE — 97110 THERAPEUTIC EXERCISES: CPT

## 2019-09-05 NOTE — PROGRESS NOTES
PT DAILY TREATMENT NOTE 10-18    Patient Name: Ferrel Prader  Date:2019  : 1945  [x]  Patient  Verified  Payor: [place] Silvestrekierra / Plan: [address] [place] [address] HMO / Product Type: Managed Care Medicare /    In time:1200  Out time:101  Total Treatment Time (min): 61  Visit #: 6 of 8    Medicare/BCBS Only   Total Timed Codes (min):  46 1:1 Treatment Time:  55       Treatment Area: Pain in left shoulder [M25.512]    SUBJECTIVE  Pain Level (0-10 scale): 2 in left hip  Any medication changes, allergies to medications, adverse drug reactions, diagnosis change, or new procedure performed?: [x] No    [] Yes (see summary sheet for update)  Subjective functional status/changes:   [] No changes reported  Pt reports pain in left hip/groin today; \"I will go days without it but sometimes it will still hurt. \"    OBJECTIVE    Modality rationale: decrease pain and increase tissue extensibility to improve the patients ability to perform hair grooming   Min Type Additional Details   15 [x] Estim:  [x]Unatt       [x]IFC  []Premod                        []Other:  []w/ice   [x]w/heat  Position: sidelying  Location: left shoulder    [] Estim: []Att    []TENS instruct  []NMES                    []Other:  []w/US   []w/ice   []w/heat  Position:  Location:    []  Traction: [] Cervical       []Lumbar                       [] Prone          []Supine                       []Intermittent   []Continuous Lbs:  [] before manual  [] after manual    []  Ultrasound: []Continuous   [] Pulsed                           []1MHz   []3MHz W/cm2:  Location:    []  Iontophoresis with dexamethasone         Location: [] Take home patch   [] In clinic    []  Ice     []  heat  []  Ice massage  []  Laser   []  Anodyne Position:  Location:    []  Laser with stim  []  Other:  Position:  Location:    []  Vasopneumatic Device Pressure:       [] lo [] med [] hi   Temperature: [] lo [] med [] hi   [] Skin assessment post-treatment:  [x]intact []redness- no adverse reaction    []redness - adverse reaction:     28 min Therapeutic Exercise:  [x] See flow sheet :   Rationale: increase ROM and increase strength to improve the patients ability to reach overhead    10 min Therapeutic Activity:  [x]  See flow sheet :   Rationale: increase strength, improve balance and increase proprioception  to improve the patients ability to ambulate community distances    8 min Manual Therapy:  Soft tissue mobilization and trigger point release to lateral and posterior left hip musculature    Rationale: decrease pain and increase tissue extensibility to improve pt's ability to transfer and tolerate increased ambulation distances          With   [] TE   [] TA   [] neuro   [] other: Patient Education: [x] Review HEP    [] Progressed/Changed HEP based on:   [] positioning   [] body mechanics   [] transfers   [] heat/ice application    [] other:      Other Objective/Functional Measures:      Pain Level (0-10 scale) post treatment: 0    ASSESSMENT/Changes in Function: increased LE exercises today to address increase in left hip pain; pt TTP at left greater trochanter. Pt reported elimination of symptoms in left hip after manual therapy. Pt tolerated progress to 's carry on left UE, however was unable to perform horizontal abduction with resistance due to weakness and substitutive patterns. Patient will continue to benefit from skilled PT services to modify and progress therapeutic interventions, address functional mobility deficits, address ROM deficits, address strength deficits, analyze and address soft tissue restrictions, analyze and cue movement patterns, analyze and modify body mechanics/ergonomics, assess and modify postural abnormalities and address imbalance/dizziness to attain remaining goals. []  See Plan of Care  []  See progress note/recertification  []  See Discharge Summary           Progress towards goals / Updated goals:   1.   Pt will demonstrate left shoulder AROM flexion to 120 in order to improve ability to perform dressing              Eval = 38deg              Recert Status: UPDATED: PROGRESSING = 70              Recert : YWNENGUJTHJ: supine = 136 deg , standing = 112deg              Progressindegrees, continue to monitor for consistency   2.   Pt will demonstrate left shoulder AROM functional ER to T2 in order to improve ability to bathe               Eval = unable                Recert Status: PROGRESSING = base of occiput              Recert : WOSYEKGLRQD = T1              MET = T2  3.   Pt will score at least 59 on FOTO in order to improve overall function, decrease pain, and facilitate return to PLOF.               Eval = 20               Recert Status: PROGRESSING = 32              Recert : GJXHFNHZJAJ = 45              To be assessed at recert   4.   Pt will demonstrate left hip and knee strength minimum of 5-/5 in order to negotiation stairs at PLOF              Eval = range 3-5/5                    Recert Status: PROGRESSING = knee flex/ext = 4+/5, hip flex/IR = 4+/5, hip ER/abd/ext = 4/5              Recert : MZWZXHJBFKJ (QQHHPSJ to reflect left, not right hip): knee flex/ext = 5/5, hip flex/IR/ER = 4+/5, abd/ext = 4/5              Met at knee; progressing hip: flex and IR = 4+/5, abd/ER = 4/5       PLAN  [x]  Upgrade activities as tolerated     [x]  Continue plan of care  []  Update interventions per flow sheet       []  Discharge due to:_  []  Other:_      Johann Farias, PT 2019  1:08 PM    Future Appointments   Date Time Provider Dyllan Fishman   2019 10:00 AM [facility]   10/22/2019 10:20 AM Mesfin Schaefer MD [facility] JOHANA SCHED   2019  8:30 AM 11 [address] THE [facility]   2019 11:40 AM Mya Feliciano NP [address] [facility]

## 2019-09-11 ENCOUNTER — HOSPITAL ENCOUNTER (OUTPATIENT)
Dept: PHYSICAL THERAPY | Age: 74
Discharge: HOME | End: 2019-09-11
Payer: MEDICARE

## 2019-09-11 PROCEDURE — 97014 ELECTRIC STIMULATION THERAPY: CPT

## 2019-09-11 PROCEDURE — 97110 THERAPEUTIC EXERCISES: CPT

## 2019-09-11 NOTE — PROGRESS NOTES
PT DAILY TREATMENT NOTE 10-18    Patient Name: Anali Alex  Date:2019  : 1945  [x]  Patient  Verified  Payor: Savanna Call / Plan: [address] [place] [address] HMO / Product Type: Managed Care Medicare /    In time:  Out time:1056  Total Treatment Time (min): 54  Visit #: 1 of 8    Medicare/BCBS Only   Total Timed Codes (min):  43 1:1 Treatment Time:  43       Treatment Area: Pain in left shoulder [M25.512]    SUBJECTIVE  Pain Level (0-10 scale): 0  Any medication changes, allergies to medications, adverse drug reactions, diagnosis change, or new procedure performed?: [x] No    [] Yes (see summary sheet for update)  Subjective functional status/changes:   [] No changes reported  See recert    OBJECTIVE    Modality rationale: decrease pain to improve the patients ability to perform ADLs   Min Type Additional Details   15 [x] Estim:  [x]Unatt       [x]IFC  []Premod                        []Other:  []w/ice   []w/heat  Position: long sitting   Location: left shoulder     [] Estim: []Att    []TENS instruct  []NMES                    []Other:  []w/US   []w/ice   []w/heat  Position:  Location:    []  Traction: [] Cervical       []Lumbar                       [] Prone          []Supine                       []Intermittent   []Continuous Lbs:  [] before manual  [] after manual    []  Ultrasound: []Continuous   [] Pulsed                           []1MHz   []3MHz W/cm2:  Location:    []  Iontophoresis with dexamethasone         Location: [] Take home patch   [] In clinic    []  Ice     []  heat  []  Ice massage  []  Laser   []  Anodyne Position:  Location:    []  Laser with stim  []  Other:  Position:  Location:    []  Vasopneumatic Device Pressure:       [] lo [] med [] hi   Temperature: [] lo [] med [] hi   [] Skin assessment post-treatment:  [x]intact []redness- no adverse reaction    []redness - adverse reaction:       43 min Therapeutic Exercise:  [x] See flow sheet :   Rationale: increase ROM and increase strength to improve the patients ability to negotiate stairs          With   [] TE   [] TA   [] neuro   [] other: Patient Education: [x] Review HEP    [] Progressed/Changed HEP based on:   [] positioning   [] body mechanics   [] transfers   [] heat/ice application    [] other:      Other Objective/Functional Measures:   Standing left shoulder flexion = 127degrees  Left hip strength: flex/ext = 4+/5, ER/abd = 4/5, IR = 5/5     Pain Level (0-10 scale) post treatment: 0    ASSESSMENT/Changes in Function: see recert    Patient will continue to benefit from skilled PT services to modify and progress therapeutic interventions, address functional mobility deficits, address ROM deficits, address strength deficits, analyze and address soft tissue restrictions, analyze and cue movement patterns, analyze and modify body mechanics/ergonomics and assess and modify postural abnormalities to attain remaining goals. []  See Plan of Care  []  See progress note/recertification  []  See Discharge Summary         Progress towards goals / Updated goals: 1.   Pt will demonstrate left shoulder AROM flexion to 130degrees with no greater than 4/10pain  in order to improve ability to perform dressing              NEW GOAL: 127degrees with 6/10 pain  2.   Pt will score at least 59 on FOTO in order to improve overall function, decrease pain, and facilitate return to PLOF.             Eval = 20               Recert Status: PROGRESSING = 32              Recert : UMCKLZQGYHT = 45              Recert : Progressin   3.   Pt will demonstrate left hip flex/ext/ER/abd strength minimum of 5-/5 in order to negotiation stairs at [facility]              Recert : Updated goal; progressing hip: flex/ext 4+/5, abd/ER = 4/5  4. Pt will report ability to close car door with left hand in order to improve her ability to drive   NEW GOAL: unable  5.   Pt will report ability to ascend/descend stairs in the parking garage with not more than 2/10 pain in order to facilitate return to [facility].     NEW GOAL: reports pain     PLAN  [x]  Upgrade activities as tolerated     [x]  Continue plan of care  []  Update interventions per flow sheet       []  Discharge due to:_  []  Other:_      Db Chin, PT 9/11/2019  12:20 PM    Future Appointments   Date Time Provider Dyllan Fishman   9/16/2019  3:30 PM [facility]ovany [facility]alcava, PT [facility]   9/23/2019 12:00 PM Yovany Rubalcava, PT [facility]   9/25/2019 12:00 PM Jose Alba [facility]   9/30/2019 12:00 PM Yovany Rubalcava, PT [facility]   10/2/2019 12:00 PM Yovany Rubalcava, PT [facility]   10/7/2019 12:00 PM Yovany Rubalcava, PT [facility]   10/9/2019 12:00 PM Yovany Rubalcava, PT [facility]   10/22/2019 10:20 AM Iron Bryant MD [facility]   12/17/2019  8:30 AM THE FRIARY [facility] RM 1 MIHRUS THE [facility]   12/17/2019 11:40 AM Will Walton NP [address] [facility]

## 2019-09-11 NOTE — PROGRESS NOTES
[facility] - [place] [address]  [address], [address] [phone] fax    Continued Plan of Care/ Re-certification for Physical Therapy Services    Patient name: Corrine Elizalde Start of Care: 2019   Referral source: Reny Walker : 1945                Medical Diagnosis: Pain in left shoulder [M25.512]  Payor: Lucian Jones / Plan: [address] [place] [address] HMO / Product Type: Managed Care Medicare /  Onset Date:19                Treatment Diagnosis: left hip and shoulder pain   Prior Hospitalization: see medical history Provider#: [id number]   Medications: Verified on Patient summary List    Comorbidities: HTN, recent fall   Prior Level of Function: functionally (I)     Visits from Start of Care: 23    Missed Visits: 1    The Plan of Care and following information is based on the patient's current status:  1.   Pt will demonstrate left shoulder AROM flexion to 120 in order to improve ability to perform dressing              Eval = 38deg              Recert Status: UPDATED: PROGRESSING = 70              Recert : [id number]: supine = 136 deg , standing = 112deg              MET: 127degrees with 6/10 pain  2.   Pt will demonstrate left shoulder AROM functional ER to T2 in order to improve ability to bathe               Eval = unable                Recert Status: PROGRESSING = base of occiput              Recert : [id number] = T1              MET = T2  3.   Pt will score at least 59 on FOTO in order to improve overall function, decrease pain, and facilitate return to PLOF.               Eval = 20               Recert Status: PROGRESSING = 32              Recert : [id number] = 39              Progressin   4.   Pt will demonstrate left hip and knee strength minimum of 5-/5 in order to negotiation stairs at PLOF              Eval = range 3-5/5                    Recert Status: PROGRESSING = knee flex/ext = 4+/5, hip flex/IR = 4+/5, hip ER/abd/ext = 4/5              Recert : [id number] (LDDBAHQ to reflect left, not right hip): knee flex/ext = 5/5, hip flex/IR/ER = 4+/5, abd/ext = 4/5              Met at knee; progressing hip: flex 4+/5, abd/ER = 4/5, IR = 5/5       Key functional changes:   Functional Gains: ability to travel long distance, carry light luggage without difficulty, increased reaching distances  Functional Deficits: cannot use left UE to close car door, leg pain especially with stairs  % improvement: 80%  Pain   Average: 2-3 in leg/10       Best: 010     Worst: 4/10  Patient Goal: \"I'll take 90% improvement; I would like 100% but I could live with 90%\"      Problems/ barriers to goal attainment: pain, strength    Problem List: pain affecting function, decrease ROM, decrease strength, edema affecting function, impaired gait/ balance, decrease ADL/ functional abilitiies, decrease activity tolerance and decrease flexibility/ joint mobility    Treatment Plan: Therapeutic exercise, Therapeutic activities, Neuromuscular re-education, Physical agent/modality, Gait/balance training, Manual therapy, Patient education, Functional mobility training, Home safety training and Stair training     Goals for this certification period to be accomplished in 4 weeks: 1.   Pt will demonstrate left shoulder AROM flexion to 130degrees with no greater than 4/10pain  in order to improve ability to perform dressing              NEW GOAL: 127degrees with 6/10 pain  2.   Pt will score at least 59 on FOTO in order to improve overall function, decrease pain, and facilitate return to OF.             Eval = 20               Recert Status: PROGRESSING = 32              Recert : [id number] = 45              Recert : Progressin   3.   Pt will demonstrate left hip flex/ext/ER/abd strength minimum of 5-/5 in order to negotiation stairs at [facility]              Recert : Updated goal; progressing hip: flex/ext 4+/5, abd/ER = 4/5  4.   Pt will report ability to close car door with left hand in order to improve her ability to drive   NEW GOAL: unable  5. Pt will report ability to ascend/descend stairs in the parking garage with not more than 2/10 pain in order to facilitate return to PLOF. NEW GOAL: reports pain     Frequency / Duration: Patient to be seen 2 times per week for 8 treatments:    Assessment / Recommendations:Ms. Wing Pabon has been a pleasure to treat and reports 80% improvement since beginning therapy. Pt reports improved ability to reach with her left UE, she was able to place her carry on luggage overhead during a flight recently; her ADLs are improving and overall feels she is progressing well. Pt continues to have left hip pain, especially with stair climbing; updated HEP today and added a new goal to address remaining deficits. . Pt will benefit from continued skilled PT in order to reduce pain and maximize functional potential.     Certification Period: 9/11/19-10/10/19    Aria Way, PT 9/11/2019 10:02 AM    ________________________________________________________________________  I certify that the above Therapy Services are being furnished while the patient is under my care. I agree with the treatment plan and certify that this therapy is necessary. [] I have read the above and request that my patient continue as recommended.   [] I have read the above report and request that my patient continue therapy with the following changes/special instructions: _____________________________________________  [] I have read the above report and request that my patient be discharged from therapy    Physician's Signature:____________Date:_________TIME:________    ** Signature, Date and Time must be completed for valid certification **    Please sign and return to [facility] - [address], [address] [phone] fax

## 2019-09-16 ENCOUNTER — HOSPITAL ENCOUNTER (OUTPATIENT)
Dept: PHYSICAL THERAPY | Age: 74
Discharge: HOME | End: 2019-09-16
Payer: MEDICARE

## 2019-09-16 PROCEDURE — 97014 ELECTRIC STIMULATION THERAPY: CPT

## 2019-09-16 PROCEDURE — 97140 MANUAL THERAPY 1/> REGIONS: CPT

## 2019-09-16 PROCEDURE — 97110 THERAPEUTIC EXERCISES: CPT

## 2019-09-16 NOTE — PROGRESS NOTES
PT DAILY TREATMENT NOTE 10-18    Patient Name: Blossom Dickerson  Date:2019  : 1945  [x]  Patient  Verified  Payor: Mallory Kay / Plan: [address] [place] [address] HMO / Product Type: Managed Care Medicare /    In time:333  Out time:425  Total Treatment Time (min): 48  Visit #: 2 of 8    Medicare/BCBS Only   Total Timed Codes (min):  38 1:1 Treatment Time:  38       Treatment Area: Pain in left shoulder [M25.512]    SUBJECTIVE  Pain Level (0-10 scale): 0  Any medication changes, allergies to medications, adverse drug reactions, diagnosis change, or new procedure performed?: [x] No    [] Yes (see summary sheet for update)  Subjective functional status/changes:   [] No changes reported  Pt was traveling over the weekend and did not do her new HEP exercises very well. Pt traveled again this weekend and had to do a lot of walking from terminal to terminal and had increased pain at night; pt states she was fine while walking and doing better today as well.      OBJECTIVE    Modality rationale: decrease pain to improve the patients ability to reach to shoulder height   Min Type Additional Details   15 [x] Estim:  []Unatt       []IFC  []Premod                        []Other:  [x]w/ice   []w/heat  Position: reclined long sitting  Location: left shoulder    [] Estim: []Att    []TENS instruct  []NMES                    []Other:  []w/US   []w/ice   []w/heat  Position:  Location:    []  Traction: [] Cervical       []Lumbar                       [] Prone          []Supine                       []Intermittent   []Continuous Lbs:  [] before manual  [] after manual    []  Ultrasound: []Continuous   [] Pulsed                           []1MHz   []3MHz W/cm2:  Location:    []  Iontophoresis with dexamethasone         Location: [] Take home patch   [] In clinic    []  Ice     []  heat  []  Ice massage  []  Laser   []  Anodyne Position:  Location:    []  Laser with stim  []  Other:  Position:  Location:    [] Vasopneumatic Device Pressure:       [] lo [] med [] hi   Temperature: [] lo [] med [] hi   [] Skin assessment post-treatment:  [x]intact []redness- no adverse reaction    []redness - adverse reaction:     30 min Therapeutic Exercise:  [x] See flow sheet :   Rationale: increase ROM and increase strength to improve the patients ability to reach overhead    8 min Manual Therapy:  Soft tissue mobilization to left RTC to reduce trigger points; medial sidelying scapular mobilizations for improve posture   Rationale: decrease pain, increase ROM, increase tissue extensibility and increase postural awareness to improve performance of ADLs          With   [] TE   [] TA   [] neuro   [] other: Patient Education: [x] Review HEP    [] Progressed/Changed HEP based on:   [] positioning   [] body mechanics   [] transfers   [] heat/ice application    [] other:      Other Objective/Functional Measures:      Pain Level (0-10 scale) post treatment: 0    ASSESSMENT/Changes in Function: Pt was challenged with shoulder AROM today as she was fatigued from recent travels. Pt showing good carry over of LE HEP despite low compliance since last visit. Patient will continue to benefit from skilled PT services to modify and progress therapeutic interventions, address functional mobility deficits, address ROM deficits, address strength deficits, analyze and address soft tissue restrictions, analyze and cue movement patterns, analyze and modify body mechanics/ergonomics, assess and modify postural abnormalities, address imbalance/dizziness and instruct in home and community integration to attain remaining goals. []  See Plan of Care  []  See progress note/recertification  []  See Discharge Summary         Progress towards goals / Updated goals:   1.   Pt will demonstrate left shoulder AROM flexion to 130degrees with no greater than 4/10pain  in order to improve ability to perform dressing              NEW GOAL: 127degrees with 6/10 pain  2.   Pt will score at least 59 on FOTO in order to improve overall function, decrease pain, and facilitate return to PLOF.             Eval = 20               Recert Status: PROGRESSING = 32              Recert : [id number] = 45              Recert 3/38: Progressin   3.   Pt will demonstrate left hip flex/ext/ER/abd strength minimum of 5-/5 in order to negotiation stairs at [facility]              Recert : Updated goal; progressing hip: flex/ext 4+/5, abd/ER = 4/5  4. Pt will report ability to close car door with left hand in order to improve her ability to drive              NEW GOAL: unable  5. Pt will report ability to ascend/descend stairs in the parking garage with not more than 2/10 pain in order to facilitate return to PLOF.                NEW GOAL: reports pain     PLAN  [x]  Upgrade activities as tolerated     [x]  Continue plan of care  []  Update interventions per flow sheet       []  Discharge due to:_  []  Other:_      Meena Zurita PT 2019  3:35 PM    Future Appointments   Date Time Provider Dyllan Fishman   2019 12:00 PM Ev Saxena [address] [facility]   2019 12:00 PM Delano Burn, PT [facility]   2019 12:00 PM Delano Burn, PT [facility]   10/2/2019 12:00 PM Delano Burn, PT [facility]   10/7/2019 12:00 PM Delano Burn, PT [facility]   10/9/2019 12:00 PM Delano Burn, PT [facility]   10/22/2019 10:20 AM Christi Sena MD [facility] JOHANA SCHED   2019  8:30 AM THE [facility] RM 1 MIHRUS THE [facility]   2019 11:40 AM Remington Torres NP [address] [facility]

## 2019-09-23 ENCOUNTER — APPOINTMENT (OUTPATIENT)
Dept: PHYSICAL THERAPY | Age: 74
End: 2019-09-23
Payer: MEDICARE

## 2019-09-25 ENCOUNTER — HOSPITAL ENCOUNTER (OUTPATIENT)
Dept: PHYSICAL THERAPY | Age: 74
Discharge: HOME | End: 2019-09-25
Payer: MEDICARE

## 2019-09-25 PROCEDURE — 97530 THERAPEUTIC ACTIVITIES: CPT

## 2019-09-25 PROCEDURE — 97014 ELECTRIC STIMULATION THERAPY: CPT

## 2019-09-25 PROCEDURE — 97110 THERAPEUTIC EXERCISES: CPT

## 2019-09-25 NOTE — PROGRESS NOTES
PT DAILY TREATMENT NOTE 10-18    Patient Name: Dean See  Date:2019  : 1945  [x]  Patient  Verified  Payor: Erika Footeles / Plan: [address] [place] [address] HMO / Product Type: Managed Care Medicare /    In time:1200  Out time:100  Total Treatment Time (min): 60  Visit #: 3 of 8    Medicare/BCBS Only   Total Timed Codes (min):  45 1:1 Treatment Time:  45       Treatment Area: Pain in left shoulder [M25.512]    SUBJECTIVE  Pain Level (0-10 scale): 1  Any medication changes, allergies to medications, adverse drug reactions, diagnosis change, or new procedure performed?: [x] No    [] Yes (see summary sheet for update)  Subjective functional status/changes:   [] No changes reported  Pt was able to travel again without much difficulty; pt reports checking baggage and having it on wheels helps her impairment stay low.     OBJECTIVE    Modality rationale: decrease inflammation and decrease pain to improve the patients ability to tolerate sustained positions   Min Type Additional Details   15 [x] Estim:  [x]Unatt       [x]IFC  []Premod                        []Other:  [x]w/ice   []w/heat  Position: reclined long sitting   Location: left shoulder    [] Estim: []Att    []TENS instruct  []NMES                    []Other:  []w/US   []w/ice   []w/heat  Position:  Location:    []  Traction: [] Cervical       []Lumbar                       [] Prone          []Supine                       []Intermittent   []Continuous Lbs:  [] before manual  [] after manual    []  Ultrasound: []Continuous   [] Pulsed                           []1MHz   []3MHz W/cm2:  Location:    []  Iontophoresis with dexamethasone         Location: [] Take home patch   [] In clinic    []  Ice     []  heat  []  Ice massage  []  Laser   []  Anodyne Position:  Location:    []  Laser with stim  []  Other:  Position:  Location:    []  Vasopneumatic Device Pressure:       [] lo [] med [] hi   Temperature: [] lo [] med [] hi   [] Skin assessment post-treatment:  [x]intact []redness- no adverse reaction    []redness - adverse reaction:     30 min Therapeutic Exercise:  [x] See flow sheet : including [id number] of long axis distraction to left LE for hip pain relief   Rationale: increase ROM and increase strength to improve the patients ability to reach overhead    15 min Therapeutic Activity:  [x]  See flow sheet :   Rationale: increase ROM, increase strength, improve balance and increase proprioception  to improve the patients ability to negotiate stairs          With   [] TE   [] TA   [] neuro   [] other: Patient Education: [x] Review HEP    [] Progressed/Changed HEP based on:   [] positioning   [] body mechanics   [] transfers   [] heat/ice application    [] other:      Other Objective/Functional Measures:   Standing shoulder flexion against wall = 120degrees  Standing left shoulder flexion AROM = 125degeres with 3/10 pain     Pain Level (0-10 scale) post treatment: 0    ASSESSMENT/Changes in Function: Pt tolerated progression of new exercises to address new goals for door closing and stairs. Pt reports elimination of hip symptoms with long axis distraction. Pt shoulder flexion progressing as end range AROM was within 5degrees of last visit with significantly reduced pain levels. Patient will continue to benefit from skilled PT services to modify and progress therapeutic interventions, address functional mobility deficits, address ROM deficits, address strength deficits, analyze and address soft tissue restrictions, analyze and cue movement patterns, analyze and modify body mechanics/ergonomics, assess and modify postural abnormalities, address imbalance/dizziness and instruct in home and community integration to attain remaining goals. []  See Plan of Care  []  See progress note/recertification  []  See Discharge Summary         Progress towards goals / Updated goals:   1.   Pt will demonstrate left shoulder AROM flexion to 130degrees with no greater than 4/10pain  in order to improve ability to perform dressing              NEW GOAL: 127degrees with 6/10 pain   Progressindegrees with 3/10 pain   2.   Pt will score at least 59 on FOTO in order to improve overall function, decrease pain, and facilitate return to PLOF.             Eval = 20               Recert Status: PROGRESSING = 32              Recert : [id number] = 45              Recert : Progressin   3.   Pt will demonstrate left hip flex/ext/ER/abd strength minimum of 5-/5 in order to negotiation stairs at [facility]              Recert : Updated goal; progressing hip: flex/ext 4+/5, abd/ER = 4/5   Progressing exercises well   4.  Pt will report ability to close car door with left hand in order to improve her ability to drive              NEW GOAL: unable   Tolerated resisted horizontal abduction in sitting   5.  Pt will report ability to ascend/descend stairs in the parking garage with not more than 2/10 pain in order to facilitate return to PLOF.                NEW GOAL: reports pain    Addition of eccentric taps and step ups        PLAN  [x]  Upgrade activities as tolerated     [x]  Continue plan of care  []  Update interventions per flow sheet       []  Discharge due to:_  []  Other:_      Shelby Vance PT 2019  12:06 PM    Future Appointments   Date Time Provider Dyllan Fishman   2019  5:00 PM Vernadine Appl, PT [facility]   2019 12:00 PM Vernadine Appl, PT [facility]   10/2/2019 12:00 PM Ev Saxena [facility]   10/7/2019 12:00 PM Vernadine Appl, PT [facility]   10/9/2019 12:00 PM Vernadine Appl, PT [facility]   10/22/2019 10:20 AM Rad[facility] Gordon [facility]   2019  8:30 AM [facility] RM 1 MIHR[address] THE [facility]   2019 11:40 AM Florecita Scott, ABRAHAM [address]

## 2019-09-26 ENCOUNTER — HOSPITAL ENCOUNTER (OUTPATIENT)
Dept: PHYSICAL THERAPY | Age: 74
Discharge: HOME | End: 2019-09-26
Payer: MEDICARE

## 2019-09-26 PROCEDURE — 97530 THERAPEUTIC ACTIVITIES: CPT

## 2019-09-26 PROCEDURE — 97110 THERAPEUTIC EXERCISES: CPT

## 2019-09-26 PROCEDURE — 97014 ELECTRIC STIMULATION THERAPY: CPT

## 2019-09-26 NOTE — PROGRESS NOTES
PT DAILY TREATMENT NOTE 10-18    Patient Name: Aysha Hidalgo  Date:2019  : 1945  [x]  Patient  Verified  Payor: [employer] / Plan: [address] [place] [address] HMO / Product Type: Managed Care Medicare /    In time:254  Out time:402  Total Treatment Time (min): 68  Visit #: 4 of 8    Medicare/BCBS Only   Total Timed Codes (min):  53 1:1 Treatment Time:  53       Treatment Area: Pain in left shoulder [M25.512]    SUBJECTIVE  Pain Level (0-10 scale): 0  Any medication changes, allergies to medications, adverse drug reactions, diagnosis change, or new procedure performed?: [x] No    [] Yes (see summary sheet for update)  Subjective functional status/changes:   [] No changes reported  \"I have been so busy today I haven't noticed any pain except for my groin a few times. ... I'm sad with this weather because this would be the time of the year I would go to the golf course. \"    OBJECTIVE    Modality rationale: decrease pain to improve the patients ability to perform ADLs   Min Type Additional Details   15 [x] Estim:  [x]Unatt       [x]IFC  []Premod                        []Other:  [x]w/ice   []w/heat  Position: reclined long sitting  Location: left shoulder     [] Estim: []Att    []TENS instruct  []NMES                    []Other:  []w/US   []w/ice   []w/heat  Position:  Location:    []  Traction: [] Cervical       []Lumbar                       [] Prone          []Supine                       []Intermittent   []Continuous Lbs:  [] before manual  [] after manual    []  Ultrasound: []Continuous   [] Pulsed                           []1MHz   []3MHz W/cm2:  Location:    []  Iontophoresis with dexamethasone         Location: [] Take home patch   [] In clinic    []  Ice     []  heat  []  Ice massage  []  Laser   []  Anodyne Position:  Location:    []  Laser with stim  []  Other:  Position:  Location:    []  Vasopneumatic Device Pressure:       [] lo [] med [] hi   Temperature: [] lo [] med [] hi   [] Skin assessment post-treatment:  [x]intact []redness- no adverse reaction    []redness - adverse reaction:     23 min Therapeutic Exercise:  [x] See flow sheet : including manual therapy for PNF rhythmic stabilization; sidelying scapular mobilizations, and soft tissue release to left latissimus dorsi   Rationale: increase ROM and increase strength to improve the patients ability to reach overhead    30 min Therapeutic Activity:  [x]  See flow sheet :   Rationale: increase ROM, increase strength, improve balance and increase proprioception  to improve the patients ability to negotiate community           With   [] TE   [] TA   [] neuro   [] other: Patient Education: [x] Review HEP    [] Progressed/Changed HEP based on:   [] positioning   [] body mechanics   [] transfers   [] heat/ice application    [] other:      Other Objective/Functional Measures: wall standing shoulder AROM flexion = 135degrees     Pain Level (0-10 scale) post treatment: 0    ASSESSMENT/Changes in Function: Pt reported pain with stepping forward off steps; instructed on use of eccentric PF to slow descent with elimination of hip pain. Pt AROM continues to improve with decreased pain, however, flexion described is between true flexion and the scapular plane. Patient will continue to benefit from skilled PT services to modify and progress therapeutic interventions, address functional mobility deficits, address ROM deficits, address strength deficits, analyze and address soft tissue restrictions, analyze and cue movement patterns, analyze and modify body mechanics/ergonomics, assess and modify postural abnormalities, address imbalance/dizziness and instruct in home and community integration to attain remaining goals. []  See Plan of Care  []  See progress note/recertification  []  See Discharge Summary         Progress towards goals / Updated goals:   1.   Pt will demonstrate left shoulder AROM flexion to 130degrees with no greater than 4/10pain  in order to improve ability to perform dressing              NEW GOAL: 127degrees with 6/10 pain              Progressindegrees with 3/10 pain   2.   Pt will score at least 59 on FOTO in order to improve overall function, decrease pain, and facilitate return to PLOF.             Eval = 20               Recert Status: PROGRESSING = 32              Recert : [id number] = 45              Recert : Progressin   3.   Pt will demonstrate left hip flex/ext/ER/abd strength minimum of 5-/5 in order to negotiation stairs at [facility]              Recert 0: Updated goal; progressing hip: flex/ext 4+/5, abd/ER = 4/5              Progressing exercises well   4.  Pt will report ability to close car door with left hand in order to improve her ability to drive              NEW GOAL: unable              Tolerated resisted horizontal abduction in sitting   5.  Pt will report ability to ascend/descend stairs in the parking garage with not more than 2/10 pain in order to facilitate return to PLOF.                NEW GOAL: reports pain               Addition of eccentric taps and step ups     PLAN  [x]  Upgrade activities as tolerated     [x]  Continue plan of care  []  Update interventions per flow sheet       []  Discharge due to:_  []  Other:_      Julianna Rojo PT 2019  2:48 PM    Future Appointments   Date Time Provider Dyllan Fishman   2019  5:00 PM [facility]   2019 12:00 PM [facility]   10/2/2019 12:00 PM Ev Saxena [facility]   10/7/2019 12:00 PM [facility]   10/9/2019 12:00 PM [facility]   10/22/2019 10:20 AM Urbano Nix MD [facility] JOHANA MCNEIL   2019  8:30 AM THE [facility] RM 1 MIHRUS THE [facility]   2019 11:40 AM Cayla Cruz NP [address] [facility]

## 2019-09-30 ENCOUNTER — HOSPITAL ENCOUNTER (OUTPATIENT)
Dept: PHYSICAL THERAPY | Age: 74
Discharge: HOME | End: 2019-09-30
Payer: MEDICARE

## 2019-09-30 PROCEDURE — 97110 THERAPEUTIC EXERCISES: CPT

## 2019-09-30 PROCEDURE — 97530 THERAPEUTIC ACTIVITIES: CPT

## 2019-09-30 PROCEDURE — 97014 ELECTRIC STIMULATION THERAPY: CPT

## 2019-09-30 NOTE — PROGRESS NOTES
PT DAILY TREATMENT NOTE 10-18    Patient Name: Yolanda Bass  Date:2019  : 1945  [x]  Patient  Verified  Payor: Marleen Pena / Plan: [address] [place] [address] HMO / Product Type: Managed Care Medicare /    In time:1208  Out time:105  Total Treatment Time (min): 62  Visit #: 4 of 8    Medicare/BCBS Only   Total Timed Codes (min):  42 1:1 Treatment Time:  42       Treatment Area: Pain in left shoulder [M25.512]    SUBJECTIVE  Pain Level (0-10 scale): 0  Any medication changes, allergies to medications, adverse drug reactions, diagnosis change, or new procedure performed?: [x] No    [] Yes (see summary sheet for update)  Subjective functional status/changes:   [] No changes reported  Pt has no significant changes to report; pt late today due to losing track of the time during chores.      OBJECTIVE  Modality rationale: decrease pain to improve the patients ability to perform ADLs   Min Type Additional Details    15 [x] Estim:  [x]Unatt       [x]IFC  []Premod                        []Other:  [x]w/ice   []w/heat  Position: reclined long sitting  Location: left shoulder       [] Estim: []Att    []TENS instruct  []NMES                    []Other:  []w/US   []w/ice   []w/heat  Position:  Location:      []  Traction: [] Cervical       []Lumbar                       [] Prone          []Supine                       []Intermittent   []Continuous Lbs:  [] before manual  [] after manual      []  Ultrasound: []Continuous   [] Pulsed                           []1MHz   []3MHz W/cm2:  Location:      []  Iontophoresis with dexamethasone         Location: [] Take home patch   [] In clinic      []  Ice     []  heat  []  Ice massage  []  Laser   []  Anodyne Position:  Location:      []  Laser with stim  []  Other:  Position:  Location:      []  Vasopneumatic Device Pressure:       [] lo [] med [] hi   Temperature: [] lo [] med [] hi    [] Skin assessment post-treatment:  [x]intact []redness- no adverse reaction []redness - adverse reaction:      15 min Therapeutic Exercise:  [x] See flow sheet : including manual therapy for  soft tissue release to left latissimus dorsi   Rationale: increase ROM and increase strength to improve the patients ability to reach overhead     27 min Therapeutic Activity:  [x]  See flow sheet :   Rationale: increase ROM, increase strength, improve balance and increase proprioception  to improve the patients ability to negotiate community                                                                  With   [] TE   [] TA   [] neuro   [] other: Patient Education: [x] Review HEP    [] Progressed/Changed HEP based on:   [] positioning   [] body mechanics   [] transfers   [] heat/ice application    [] other:       Other Objective/Functional Measures:      Pain Level (0-10 scale) post treatment: 0     ASSESSMENT/Changes in Function: Pt was challenged today with the addition of golf swing, only able to produce half swing range due to pain reports of \"tightness. \" Pt continues to be very tender to palpation at left latissimus dorsi and subscapularis. Pt was instructed at her car with strategies for closing her door with her Left UE.      Patient will continue to benefit from skilled PT services to modify and progress therapeutic interventions, address functional mobility deficits, address ROM deficits, address strength deficits, analyze and address soft tissue restrictions, analyze and cue movement patterns, analyze and modify body mechanics/ergonomics, assess and modify postural abnormalities, address imbalance/dizziness and instruct in home and community integration to attain remaining goals. []  See Plan of Care  []  See progress note/recertification  []  See Discharge Summary         Progress towards goals / Updated goals:   1.   Pt will demonstrate left shoulder AROM flexion to 130degrees with no greater than 4/10pain  in order to improve ability to perform dressing              NEW GOAL: 127degrees with 6/10 pain              Progressindegrees with 3/10 pain   2.   Pt will score at least 59 on FOTO in order to improve overall function, decrease pain, and facilitate return to PLOF.             Eval = 20               Recert Status: PROGRESSING = 32              Recert : [id number] = 45              Recert : Progressin   3.   Pt will demonstrate left hip flex/ext/ER/abd strength minimum of 5-/5 in order to negotiation stairs at [facility]              Recert : Updated goal; progressing hip: flex/ext 4+/5, abd/ER = 4/5              Progressing exercises well   4.  Pt will report ability to close car door with left hand in order to improve her ability to drive              NEW GOAL: unable              Tolerated resisted horizontal abduction in sitting   5.  Pt will report ability to ascend/descend stairs in the parking garage with not more than 2/10 pain in order to facilitate return to PLOF.                NEW GOAL: reports pain               Addition of eccentric taps and step ups      PLAN  [x]  Upgrade activities as tolerated     [x]  Continue plan of care  []  Update interventions per flow sheet       []  Discharge due to:_  []  Other:_      Maribell Murphy, PT 2019  12:56 PM    Future Appointments   Date Time Provider Dyllan Fishman   10/2/2019 12:00 PM Ev Saxena [address] [facility]   10/7/2019 12:00 PM Brian Almanzar PT [facility]   10/9/2019 12:00 [facility]   10/22/2019 10:20 AM Luma Apodaca MD [facility] JOHANA SCHED   2019  8:30 AM [facility]   2019 11:40 AM Danielito Linton NP [facility]

## 2019-10-02 ENCOUNTER — HOSPITAL ENCOUNTER (OUTPATIENT)
Dept: PHYSICAL THERAPY | Age: 74
Discharge: HOME | End: 2019-10-02
Payer: MEDICARE

## 2019-10-02 PROCEDURE — 97140 MANUAL THERAPY 1/> REGIONS: CPT

## 2019-10-02 PROCEDURE — 97530 THERAPEUTIC ACTIVITIES: CPT

## 2019-10-02 PROCEDURE — 97014 ELECTRIC STIMULATION THERAPY: CPT

## 2019-10-02 PROCEDURE — 97110 THERAPEUTIC EXERCISES: CPT

## 2019-10-02 NOTE — PROGRESS NOTES
PT DAILY TREATMENT NOTE 10-18    Patient Name: Dulce Sensing  Date:10/2/2019  : 1945  [x]  Patient  Verified  Payor: Jeb Gitelman / Plan: [address] [place] [address] HMO / Product Type: Managed Care Medicare /    In [id number]  Out time:102  Total Treatment Time (min): 62  Visit #: 6 of 8    Medicare/BCBS Only   Total Timed Codes (min):  47 1:1 Treatment Time:  52       Treatment Area: Pain in left shoulder [M25.512]    SUBJECTIVE  Pain Level (0-10 scale): 0  Any medication changes, allergies to medications, adverse drug reactions, diagnosis change, or new procedure performed?: [x] No    [] Yes (see summary sheet for update)  Subjective functional status/changes:   [] No changes reported  Pt has no significant changes to report today.      OBJECTIVE    Modality rationale: decrease pain to improve the patients ability to perform ADLs   Min Type Additional Details     15 [x] Estim:  [x]Unatt       [x]IFC  []Premod                        []Other:  [x]w/ice   []w/heat  Position: reclined long sitting  Location: left shoulder        [] Estim: []Att    []TENS instruct  []NMES                    []Other:  []w/US   []w/ice   []w/heat  Position:  Location:       []  Traction: [] Cervical       []Lumbar                       [] Prone          []Supine                       []Intermittent   []Continuous Lbs:  [] before manual  [] after manual       []  Ultrasound: []Continuous   [] Pulsed                           []1MHz   []3MHz W/cm2:  Location:       []  Iontophoresis with dexamethasone         Location: [] Take home patch   [] In clinic       []  Ice     []  heat  []  Ice massage  []  Laser   []  Anodyne Position:  Location:       []  Laser with stim  []  Other:  Position:  Location:       []  Vasopneumatic Device Pressure:       [] lo [] med [] hi   Temperature: [] lo [] med [] hi     [] Skin assessment post-treatment:  [x]intact []redness- no adverse reaction    []redness - adverse reaction:      12 min Therapeutic Exercise:  [x] See flow sheet :    Rationale: increase ROM and increase strength to improve the patients ability to reach overhead     25 min Therapeutic Activity:  [x]  See flow sheet :   Rationale: increase ROM, increase strength, improve balance and increase proprioception  to improve the patients ability to negotiate community     10 min Manual Therapy:  [x] See flow sheet : left LE long axis distraction; soft tissue mobilization with trigger point release to left glutes     Rationale: decrease pain, reduce trigger points, and improve tissue extensibility to improve the patients ability to climb stairs     With   [] TE   [] TA   [] neuro   [] other: Patient Education: [x] Review HEP    [] Progressed/Changed HEP based on:   [] positioning   [] body mechanics   [] transfers   [] heat/ice application    [] other:       Other Objective/Functional Measures:   Left shoulder AROM = 130degrees with 2/10 pain     Pain Level (0-10 scale) post treatment: 0    ASSESSMENT/Changes in Function: Pt continues to demonstrate excellent motivation and compliance to HEP. Pt AROM is progressing with reducing pain levels. Pt demonstrating increased antalgic gait upon entry; pt reporting relief with trigger point release to lateral and posterior hip musculature. Patient will continue to benefit from skilled PT services to modify and progress therapeutic interventions, address functional mobility deficits, address ROM deficits, address strength deficits, analyze and address soft tissue restrictions, analyze and cue movement patterns, analyze and modify body mechanics/ergonomics, assess and modify postural abnormalities and address imbalance/dizziness to attain remaining goals. []  See Plan of Care  []  See progress note/recertification  []  See Discharge Summary         Progress towards goals / Updated goals:   1.   Pt will demonstrate left shoulder AROM flexion to 130degrees with no greater than 4/10pain  in order to improve ability to perform dressing              NEW GOAL: 127degrees with 6/10 pain              Progressindegrees with 2/10 pain, continue to monitor for consistency   2.   Pt will score at least 59 on FOTO in order to improve overall function, decrease pain, and facilitate return to PLOF.             Eval = 20               Recert Status: PROGRESSING = 32              Recert 3/16: [id number] = 45              Recert : Progressin    Reassess at recert  3.   Pt will demonstrate left hip flex/ext/ER/abd strength minimum of 5-/5 in order to negotiation stairs at [facility]              Recert : Updated goal; progressing hip: flex/ext 4+/5, abd/ER = 4/5              Progressing exercises well   4.  Pt will report ability to close car door with left hand in order to improve her ability to drive              NEW GOAL: unable              Pt reports ability to shut door with preplacement   5.  Pt will report ability to ascend/descend stairs in the parking garage with not more than 2/10 pain in order to facilitate return to PLOF.                NEW GOAL: reports pain               Addition of eccentric taps and step ups     PLAN  [x]  Upgrade activities as tolerated     [x]  Continue plan of care  []  Update interventions per flow sheet       []  Discharge due to:_  []  Other:_      Hernando Lin PT 10/2/2019  11:57 AM    Future Appointments   Date Time Provider Dyllan Fishman   10/2/2019 12:00 PM Ev Saxena 238 [address]   10/7/2019 12:00 PM Da Daniel PT [facility] [address]   10/9/2019 12:00 PM Da Daniel PT [facility] [address]   10/22/2019 10:20 AM [facility]ox [facility]   2019  8:30 AM [facility]   2019 11:40 AM Isabell Wilson NP [facility] [facility]

## 2019-10-06 NOTE — PROGRESS NOTES
----- Message from Zafar Angulo PA-C sent at 10/6/2019  7:50 AM CDT -----  Please notify the patient of normal results.  Follow-up as planned.   [address] Tadeo MD, [address], [place], [place]       ABRAHAM Wills PA-C    [facility]   ABRAHAM Avila NP        at [address], [address] [facility]     [facility], [address]     [phone]     FAX: [phone]    at [address], [id number] [place] [address], [address]     [phone]     FAX: [phone]       Patient Care Team:  Blanca Rojas MD as PCP - General (Internal Medicine)      Problem List  Date Reviewed: 4/23/2018          Codes Class Noted    Autoimmune hepatitis [facility]) ICD-10-CM: K75.4  ICD-9-CM: 571.42  6/28/2017        Hypertension ICD-10-CM: I10  ICD-9-CM: 401.9  6/28/2017              Edith Costello returns to the [facility] for management of autoimmune hepatitis. The active problem list, all pertinent past medical history, medications, liver histology, radiologic findings, and laboratory findings related to the liver disorder were reviewed with the patient. The patient is a 71 y.o. Black female who was first noted to have abnormalities in liver transaminases in 2012 and then developed more profound elevation in liver enzymes and jaundice while she was visiting family in [facility]. She was hospitalized and transferred to [facility]. A liver biopsy was performed. The findings were consistent with severe AIH and she was treated with high dose prednisone and cellcept. Prednisone was eventually tapered off. She remains on low dose cellcept. The most recent imaging done was ultrasound 5/2018. The results demonstrated nonspecific mild increased hepatic echotexture. Assessment of liver fibrosis was performed with sheer wave elastography at [facility] in 5/2018. The result was 3.89 kPa which correlates with no fibrosis.     The most recent laboratory studies indicate that the liver transaminases are normal, ALP is normal, tests of hepatic synthetic and metabolic function are normal, the platelet count is normal.     The patient has no symptoms which could be attributed to the liver disorder. The patient completes all daily activities without any functional limitations. The patient has not experienced fatigue, pain in the right side over the liver. ALLERGIES  No Known Allergies    MEDICATIONS  Current Outpatient Prescriptions   Medication Sig    mycophenolate mofetil (CELLCEPT) 250 mg capsule Take 1 Cap by mouth two (2) times a day.  calcium-cholecalciferol, d3, (CALCIUM 600 + D) 600-125 mg-unit tab Take  by mouth.  diclofenac (VOLTAREN) 1 % gel Apply 4 g to affected area two (2) times a day. Apply to affected area as directed.  azithromycin (ZITHROMAX) 250 mg tablet take 2 tablets by mouth today then take 1 tablet DAILY FOR 4 DAYS    hydroCHLOROthiazide (HYDRODIURIL) 12.5 mg tablet Take 12.5 mg by mouth daily. No current facility-administered medications for this visit. SYSTEM REVIEW NOT RELATED TO LIVER DISEASE OR REVIEWED ABOVE:  Constitution systems: Negative for fever, chills, weight gain, weight loss. Eyes: Negative for visual changes. ENT: Negative for sore throat, painful swallowing. Respiratory: Negative for cough, hemoptysis, SOB. Cardiology: Negative for chest pain, palpitations. GI:  Negative for constipation or diarrhea. : Negative for urinary frequency, dysuria, hematuria, nocturia. Skin: Negative for rash. Hematology: Negative for easy bruising, blood clots. Musculo-skelatal: Negative for back pain, muscle pain, weakness. Neurologic: Negative for headaches, dizziness, vertigo, memory problems not related to HE. Psychology: Negative for anxiety, depression. FAMILY HISTORY:  The father  of COPD. The mother  of breast cancer. There is no family history of liver disease. There is no family history of immune disorders.     SOCIAL HISTORY:  The patient is . The patient has 1 child. The patient has never used tobacco products. The patient has never consumed significant amounts of alcohol. The patient used to work as a . The patient retired in 2014. PHYSICAL EXAMINATION:  Visit Vitals    /83    Pulse 82    Temp 98.3 °F (36.8 °C) (Tympanic)    Ht 5' 7\" (1.702 m)    Wt 163 lb 4 oz (74 kg)    SpO2 97%    BMI 25.57 kg/m2     General: No acute distress. Eyes: Sclera anicteric. ENT: No oral lesions. Thyroid normal.  Nodes: No adenopathy. Skin: No spider angiomata. No jaundice. No palmar erythema. Respiratory: Lungs clear to auscultation. Cardiovascular: Regular heart rate. No murmurs. No JVD. Abdomen: Soft non-tender. Liver size normal to percussion/palpation. Spleen not palpable. No obvious ascites. Extremities: No edema. No muscle wasting. No gross arthritic changes. Neurologic: Alert and oriented. Cranial nerves grossly intact. No asterixsis. LABORATORY STUDIES:  [facility] [place] [facility] [address] [place] [address] 7/9/2018 4/19/2018 10/3/2017   WBC 3.4 - 10.8 x10E3/uL 5.3 6.2 4.9   ANC 1.4 - 7.0 x10E3/uL 2.7 3.1 2.6   HGB 11.1 - 15.9 g/dL 12.8 12.0 12.9    - 379 x10E3/uL 252 254 247   AST 0 - 40 IU/L 29 16 17   ALT 0 - 32 IU/L 26 8 12   Alk Phos 39 - 117 IU/L 94 82 84   Bili, Total 0.0 - 1.2 mg/dL 0.4 0.3 0.4   Bili, Direct 0.00 - 0.40 mg/dL 0.09 0.09 0.11   Albumin 3.5 - 4.8 g/dL 4.3 4.1 4.4   BUN 8 - 27 mg/dL 12 14 12   Creat 0.57 - 1.00 mg/dL 0.82 0.74 0.81   Na 134 - 144 mmol/L 141 136 143   K 3.5 - 5.2 mmol/L 4.6 4.7 5.0   Cl 96 - 106 mmol/L 101 97 102   CO2 20 - 29 mmol/L 27 27 27   Glucose 65 - 99 mg/dL 94 97 89     SEROLOGIES:  Serologies Latest Ref Rng & Units 10/3/2017 10/3/2017 6/28/2017   LEONILA, IFA  Positive (A) WILL FOLLOW Negative   LEONILA Pattern   1:160 (H)    ASMCA 0 - 19 Units 27 (H) WILL FOLLOW 31 (H)     LIVER HISTOLOGY:  6/2012.   Liver biopsy at [facility]. Severe hepatitis with cholestasis consistent with AIH.    1/2017. Fiboscan at [facility]. Consistent with F0.  5/2018. Sheer wave elastography performed at [facility]. EkPa was E Range: 3.26-4.76 kPa, E Mean: 3.89 kPa, E Median: 3.85 kPa, E Std: 0.44 kPa. The results suggested a fibrosis level of F0. ENDOSCOPIC PROCEDURES:  Not available or performed    RADIOLOGY:  2/2012. Ultrasound of liver. Echogenic consistent with chronic liver disease. No liver mass lesions. No dilated bile ducts. No ascites. 5/2016. Ultrasound of liver. Echogenic consistent with chronic liver disease. No liver mass lesions. No dilated bile ducts. No ascites. 5/2018. Ultrasound of liver. Echogenic consistent with chronic liver disease. No liver mass lesions. No dilated bile ducts. No ascites. OTHER TESTING:  Not available or performed    ASSESSMENT AND PLAN:  Autoimmune Hepatitis by liver biopsy in 2012. Liver transaminases are now normal.  Alkaline phosphate is normal.  Liver function is normal.  The platelet count is normal.      Based upon laboratory studies and imaging the patient does not appear to have significant liver injury. The patient is receiving treatment with Cellcept. The patient is responding to and is tolerating the treatment without significant side effects. Will perform laboratory testing to monitor liver function and degree of liver injury. This will include BMP, hepatic panel, CBC with platelet count. There is no reason to perform liver biopsy at this time. The need to assess liver fibrosis was discussed. Sheer wave elastography can assess liver fibrosis and determine if a patient has advanced fibrosis or cirrhosis without the need for liver biopsy. Sheer wave elastography is now available at [facility]. Elastography can be repeated annually to demonstrate that the treatment is resolving hepatic fibrosis.       The patient was directed to continue all current medications at the current dosages. There are no contraindications for the patient to take any medications that are necessary for treatment of other medical issues. The patient was counseled regarding alcohol consumption. The need for vaccination against viral hepatitis A and B will be assessed with serologic and instituted as appropriate. All of the above issues were discussed with the patient. All questions were answered. The patient expressed a clear understanding of the above. [address] [address] in 4 months to monitor.        ABRAHAM Bundy [address]  [facility], [address] [place] [address] Street [address]  [phone]

## 2019-10-07 ENCOUNTER — HOSPITAL ENCOUNTER (OUTPATIENT)
Dept: PHYSICAL THERAPY | Age: 74
Discharge: HOME | End: 2019-10-07
Payer: MEDICARE

## 2019-10-07 PROCEDURE — 97110 THERAPEUTIC EXERCISES: CPT

## 2019-10-07 PROCEDURE — 97530 THERAPEUTIC ACTIVITIES: CPT

## 2019-10-07 PROCEDURE — 97014 ELECTRIC STIMULATION THERAPY: CPT

## 2019-10-07 PROCEDURE — 97140 MANUAL THERAPY 1/> REGIONS: CPT

## 2019-10-07 NOTE — PROGRESS NOTES
PT DAILY TREATMENT NOTE 10-18    Patient Name: Odalys Close  Date:10/7/2019  : 1945  [x]  Patient  Verified  Payor: Marcia Rodriguez / Plan: [address] [place] [address] HMO / Product Type: Managed Care Medicare /    In time:1200  Out time:101  Total Treatment Time (min): 61  Visit #: 7 of 8    Medicare/BCBS Only   Total Timed Codes (min):  46 1:1 Treatment Time:  46       Treatment Area: Pain in left shoulder [M25.512]    SUBJECTIVE  Pain Level (0-10 scale): 0  Any medication changes, allergies to medications, adverse drug reactions, diagnosis change, or new procedure performed?: [x] No    [] Yes (see summary sheet for update)  Subjective functional status/changes:   [] No changes reported  Pt reports no pain today but had increased pain yesterday after doing yard work over the weekend.      OBJECTIVE  Modality rationale: decrease pain to improve the patients ability to perform ADLs   Min Type Additional Details     15 [x] Estim:  [x]Unatt       [x]IFC  []Premod                        []Other:  [x]w/ice   []w/heat  Position: reclined long sitting  Location: left shoulder        [] Estim: []Att    []TENS instruct  []NMES                    []Other:  []w/US   []w/ice   []w/heat  Position:  Location:       []  Traction: [] Cervical       []Lumbar                       [] Prone          []Supine                       []Intermittent   []Continuous Lbs:  [] before manual  [] after manual       []  Ultrasound: []Continuous   [] Pulsed                           []1MHz   []3MHz W/cm2:  Location:       []  Iontophoresis with dexamethasone         Location: [] Take home patch   [] In clinic       []  Ice     []  heat  []  Ice massage  []  Laser   []  Anodyne Position:  Location:       []  Laser with stim  []  Other:  Position:  Location:       []  Vasopneumatic Device Pressure:       [] lo [] med [] hi   Temperature: [] lo [] med [] hi     [] Skin assessment post-treatment:  [x]intact []redness- no adverse reaction    []redness - adverse reaction:      13 min Therapeutic Exercise:  [x] See flow sheet :    Rationale: increase ROM and increase strength to improve the patients ability to reach overhead     21 min Therapeutic Activity:  [x]  See flow sheet :   Rationale: increase ROM, increase strength, improve balance and increase proprioception  to improve the patients ability to negotiate community      12 min Manual Therapy:  [x] See flow sheet : Trigger point release to left supraspinatus, UT, subscap and latissimus dorsi    Rationale: decrease pain, reduce trigger points, and improve tissue extensibility to improve the patients ability to climb stairs     With   [] TE   [] TA   [] neuro   [] other: Patient Education: [x] Review HEP    [] Progressed/Changed HEP based on:   [] positioning   [] body mechanics   [] transfers   [] heat/ice application    [] other:       Other Objective/Functional Measures: standing left shoulder AROM flexion: pre manual = 115, post manual = 135degrees     Pain Level (0-10 scale) post treatment: 0    ASSESSMENT/Changes in Function: Pt is benefitting from manual treatment and exercises, showing significant improvement with AROM post treatment. Pt balance is inconsistent, requiring varying SBA to max A during tandem balance to recover LOB. Reassess next visit    Patient will continue to benefit from skilled PT services to modify and progress therapeutic interventions, address functional mobility deficits, address ROM deficits, address strength deficits, analyze and address soft tissue restrictions, analyze and cue movement patterns, analyze and modify body mechanics/ergonomics, assess and modify postural abnormalities, address imbalance/dizziness and instruct in home and community integration to attain remaining goals. []  See Plan of Care  []  See progress note/recertification  []  See Discharge Summary         Progress towards goals / Updated goals:   1.   Pt will demonstrate left shoulder AROM flexion to 130degrees with no greater than 4/10pain  in order to improve ability to perform dressing              NEW GOAL: 127degrees with 6/10 pain              Progressin degrees post manual   2.   Pt will score at least 59 on FOTO in order to improve overall function, decrease pain, and facilitate return to PLOF.             Eval = 20               Recert Status: PROGRESSING = 32              Recert : [id number] = 45              Recert : Progressin               Reassess at recert  3.   Pt will demonstrate left hip flex/ext/ER/abd strength minimum of 5-/5 in order to negotiation stairs at [facility]              Recert : Updated goal; progressing hip: flex/ext 4+/5, abd/ER = 4/5              Progressing exercises well   4.  Pt will report ability to close car door with left hand in order to improve her ability to drive              NEW GOAL: unable              Pt reports ability to shut door with preplacement   5.  Pt will report ability to ascend/descend stairs in the parking garage with not more than 2/10 pain in order to facilitate return to PLOF.                NEW GOAL: reports pain               Addition of eccentric taps and step ups        PLAN  [x]  Upgrade activities as tolerated     [x]  Continue plan of care  []  Update interventions per flow sheet       []  Discharge due to:_  []  Other:_      Madan Benoit, PT 10/7/2019  12:36 PM    Future Appointments   Date Time Provider Dyllan Fishman   10/9/2019 12:00 PM Ev Saxena [address] [facility]   10/22/2019 10:20 AM Pratibha Martell MD [facility] JOHANA SCHED   2019  8:30 AM [facility]   2019 11:40 AM Cecilio Boswell NP 16[address] [facility]

## 2019-10-09 ENCOUNTER — HOSPITAL ENCOUNTER (OUTPATIENT)
Dept: PHYSICAL THERAPY | Age: 74
Discharge: HOME | End: 2019-10-09
Payer: MEDICARE

## 2019-10-09 PROCEDURE — 97014 ELECTRIC STIMULATION THERAPY: CPT

## 2019-10-09 PROCEDURE — 97110 THERAPEUTIC EXERCISES: CPT

## 2019-10-09 PROCEDURE — 97530 THERAPEUTIC ACTIVITIES: CPT

## 2019-10-09 NOTE — PROGRESS NOTES
[facility] [place] [address]  [address], [address] [phone] fax    Continued Plan of Care/ Re-certification for Physical Therapy Services    Patient name: Son Doan Start of Care: 2019   Referral source: Addis Camara : 1945                Medical Diagnosis: Pain in left shoulder [M25.512]  Payor: Scott Robbins / Plan: [address] [place] [address] HMO / Product Type: Managed Care Medicare /  Onset Date:19                Treatment Diagnosis: left hip and shoulder pain   Prior Hospitalization: see medical history Provider#: [id number]   Medications: Verified on Patient summary List    Comorbidities: HTN, recent fall   Prior Level of Function: functionally (I)     Visits from Start of Care: 30    Missed Visits: 1    The Plan of Care and following information is based on the patient's current status:  1.   Pt will demonstrate left shoulder AROM flexion to 130degrees with no greater than 4/10pain  in order to improve ability to perform dressing              NEW GOAL: 127degrees with 6/10 pain              MET = 133degrees today with 3/10 pain  2.   Pt will score at least 59 on FOTO in order to improve overall function, decrease pain, and facilitate return to PLOF.             Eval = 20               Recert Status: PROGRESSING = 32              Recert : [id number] = 45              Recert : Progressin               Regression: 46, pt reports increased displeasure with hip symptoms which may contribute to reduced score  3.   Pt will demonstrate left hip flex/ext/ER/abd strength minimum of 5-/5 in order to negotiation stairs at [facility]              Recert 0: Updated goal; progressing hip: flex/ext 4+/5, abd/ER = 4/5              Progressing   Flex = 5-/5   ER = 4+/5 with p!    Abd= 4/5   Ext = 4+/5  4.  Pt will report ability to close car door with left hand in order to improve her ability to drive              NEW GOAL: unable              MET  5.  Pt will report ability to ascend/descend stairs in the parking garage with not more than 2/10 pain in order to facilitate return to PLOF.             NEW GOAL: reports pain               Progressing: max 2/10 pain, however, requires use of hand rail to ascend     Key functional changes:   Functional Gains: ADLs including reaching, lifting, chanaging linens without concern for pain  Functional Deficits: continued intermittent leg pain, unable to identify aggravating activities  % improvement: 80%  Pain   Average: 1/10       Best: 0/10     Worst: 2/10  Patient Goal: \"to be 100%, but I'll take 95%. I just want to be able to do my hair and I cant do it yet\"      Problems/ barriers to goal attainment: pain, MRI findings     Problem List: pain affecting function, decrease ROM, decrease strength, impaired gait/ balance, decrease ADL/ functional abilitiies, decrease activity tolerance and decrease flexibility/ joint mobility    Treatment Plan: Therapeutic exercise, Therapeutic activities, Neuromuscular re-education, Physical agent/modality, Gait/balance training, Manual therapy, Patient education, Self Care training, Functional mobility training, Home safety training and Stair training     Goals for this certification period to be accomplished in 4 weeks: 1.   Pt will score at least 59 on FOTO in order to improve overall function, decrease pain, and facilitate return to PLOF.             Eval = 20               Recert Status: PROGRESSING = 32              Recert : [id number] = 45              Recert : Progressin               MWGNSA : Regression: 46, pt reports increased displeasure with hip symptoms which may contribute to reduced score  2.   Pt will demonstrate left hip flex/ext/ER/abd strength minimum of 5-/5 in order to negotiation stairs at [facility]              Recert : Progressing   Flex = 5-/5   ER = 4+/5 with p!    Abd= 4/5   Ext = 4+/5  3.  Pt will report ability to comb her hair with no greater than 3/10 pain              NEW GOAL: unable  4.  Pt will report ability to ascend/descend stairs in the parking garage with not more than 2/10 pain in order to facilitate return to PLOF.             Recert status: Progressing: max 2/10 pain, however, requires use of hand rail to ascend     Frequency / Duration: Patient to be seen 2 times per week for 4 weeks:    Assessment / Recommendations:Ms. Terence Lovelace has been a pleasure to treat and reports 80% improvement since beginning PT. Pt reports great improvement in ADLs and quality of life, no longer having concern to lift objects or perform tasks like making her bed. Pt continues to be unable to comb/groom her hair and has continued hip pain with referral through the groin and into the thigh. Pt will benefit from continued skilled PT in order to address listed deficits, decrease pain, and maximize functional potential.     Certification Period: 10/9/19-11/7/19    Marcin Siddiqi, PT 10/9/2019 12:08 PM    ________________________________________________________________________  I certify that the above Therapy Services are being furnished while the patient is under my care. I agree with the treatment plan and certify that this therapy is necessary. [] I have read the above and request that my patient continue as recommended.   [] I have read the above report and request that my patient continue therapy with the following changes/special instructions: _____________________________________________  [] I have read the above report and request that my patient be discharged from therapy    Physician's Signature:____________Date:_________TIME:________    ** Signature, Date and Time must be completed for valid certification **    Please sign and return to [facility] [address]  [address], [address] [phone] fax

## 2019-10-09 NOTE — PROGRESS NOTES
PT DAILY TREATMENT NOTE 10-18    Patient Name: Yolanda Bass  Date:10/9/2019  : 1945  [x]  Patient  Verified  Payor: Marleen Pena / Plan: [address] [place] [address] HMO / Product Type: Managed Care Medicare /    In time:1202  Out time:102  Total Treatment Time (min): 60  Visit #: 8 of 8    Medicare/BCBS Only   Total Timed Codes (min):  48 1:1 Treatment Time:  48       Treatment Area: Pain in left shoulder [M25.512]    SUBJECTIVE  Pain Level (0-10 scale): 0  Any medication changes, allergies to medications, adverse drug reactions, diagnosis change, or new procedure performed?: [x] No    [] Yes (see summary sheet for update)  Subjective functional status/changes:   [] No changes reported  Pt reports the last 48hours of her hip have been better but she is still having trouble with it and it is more of a focus for her.      OBJECTIVE    Modality rationale: decrease pain to improve the patients ability to perform ADLs   Min Type Additional Details     12 [x] Estim:  [x]Unatt       [x]IFC  []Premod                        []Other:  []w/ice   [x]w/heat  Position: reclined long sitting  Location: left shoulder        [] Estim: []Att    []TENS instruct  []NMES                    []Other:  []w/US   []w/ice   []w/heat  Position:  Location:       []  Traction: [] Cervical       []Lumbar                       [] Prone          []Supine                       []Intermittent   []Continuous Lbs:  [] before manual  [] after manual       []  Ultrasound: []Continuous   [] Pulsed                           []1MHz   []3MHz W/cm2:  Location:       []  Iontophoresis with dexamethasone         Location: [] Take home patch   [] In clinic       []  Ice     []  heat  []  Ice massage  []  Laser   []  Anodyne Position:  Location:       []  Laser with stim  []  Other:  Position:  Location:       []  Vasopneumatic Device Pressure:       [] lo [] med [] hi   Temperature: [] lo [] med [] hi     [] Skin assessment post-treatment:  [x]intact []redness- no adverse reaction    []redness - adverse reaction:      13 min Therapeutic Exercise:  [x] See flow sheet : including long axis distraction of left Le for pain relief; trigger point release to left TFL   Rationale: increase ROM and increase strength to improve the patients ability to reach overhead     35 min Therapeutic Activity:  [x]  See flow sheet :   Rationale: increase ROM, increase strength, improve balance and increase proprioception  to improve the patients ability to negotiate community           With   [] TE   [] TA   [] neuro   [] other: Patient Education: [x] Review HEP    [] Progressed/Changed HEP based on:   [] positioning   [] body mechanics   [] transfers   [] heat/ice application    [] other:      Other Objective/Functional Measures:   Standing left shoulder AROM flexion = 133deg   Left hip strength   Flex = 5-/5               ER = 4+/5 with p! Abd= 4/5               Ext = 4+/5     Pain Level (0-10 scale) post treatment: 0    ASSESSMENT/Changes in Function: see recert    Patient will continue to benefit from skilled PT services to modify and progress therapeutic interventions, address functional mobility deficits, address ROM deficits, address strength deficits, analyze and address soft tissue restrictions, analyze and cue movement patterns, analyze and modify body mechanics/ergonomics, assess and modify postural abnormalities, address imbalance/dizziness and instruct in home and community integration to attain remaining goals. []  See Plan of Care  [x]  See progress note/recertification  []  See Discharge Summary         Progress towards goals / Updated goals: 1.   Pt will score at least 59 on FOTO in order to improve overall function, decrease pain, and facilitate return to PLOF.               Eval = 20               Recert Status: PROGRESSING = 32              Recert : [id number] = 45              Recert : Progressin               Recert 10/9: Regression: 46, pt reports increased displeasure with hip symptoms which may contribute to reduced score  2.   Pt will demonstrate left hip flex/ext/ER/abd strength minimum of 5-/5 in order to negotiation stairs at [facility]              Recert 71/8: Progressing               Flex = 5-/5               ER = 4+/5 with p! Abd= 4/5               Ext = 4+/5  3.  Pt will report ability to comb her hair with no greater than 3/10 pain              NEW GOAL: unable  4.  Pt will report ability to ascend/descend stairs in the parking garage with not more than 2/10 pain in order to facilitate return to PLOF.                Recert status: Progressing: max 2/10 pain, however, requires use of hand rail to ascend     PLAN  [x]  Upgrade activities as tolerated     [x]  Continue plan of care  []  Update interventions per flow sheet       []  Discharge due to:_  []  Other:_      Axel Prado, PT 10/9/2019  12:36 PM    Future Appointments   Date Time Provider Dyllan Fishman   10/22/2019 10:20 AM Bernice Moran MD [facility] JOHANA SCHED   12/17/2019  8:30 AM THE [facility] RM 1 MIHRUS THE [facility]   12/17/2019 11:40 AM Daryle Gerhard, NP 1601 [facility]

## 2019-10-16 ENCOUNTER — APPOINTMENT (OUTPATIENT)
Dept: PHYSICAL THERAPY | Age: 74
End: 2019-10-16
Payer: MEDICARE

## 2019-10-18 ENCOUNTER — HOSPITAL ENCOUNTER (OUTPATIENT)
Dept: PHYSICAL THERAPY | Age: 74
Discharge: HOME | End: 2019-10-18
Payer: MEDICARE

## 2019-10-18 PROCEDURE — 97140 MANUAL THERAPY 1/> REGIONS: CPT

## 2019-10-18 PROCEDURE — 97110 THERAPEUTIC EXERCISES: CPT

## 2019-10-18 PROCEDURE — 97530 THERAPEUTIC ACTIVITIES: CPT

## 2019-10-18 PROCEDURE — 97014 ELECTRIC STIMULATION THERAPY: CPT

## 2019-10-18 NOTE — PROGRESS NOTES
PT DAILY TREATMENT NOTE 10-18    Patient Name: Jamarcus Dominique  Date:10/18/2019  : 1945  [x]  Patient  Verified  Payor: Chester Acosta / Plan: [address] [place] [address] HMO / Product Type: Managed Care Medicare /    In time:301  Out time:405  Total Treatment Time (min): 59  Visit #: 1 of 8    Medicare/BCBS Only   Total Timed Codes (min):  49 1:1 Treatment Time:  52       Treatment Area: Pain in left shoulder [M25.512]    SUBJECTIVE  Pain Level (0-10 scale): 0 shoulder, 2 left hip   Any medication changes, allergies to medications, adverse drug reactions, diagnosis change, or new procedure performed?: [x] No    [] Yes (see summary sheet for update)  Subjective functional status/changes:   [] No changes reported  \"I have been having trouble with my hip, but I was very pleased with the ROM in my shoulder. I was reachign in the store, I didn't get all the way to what I wanted but I did get farther than I thought I would. \"    OBJECTIVE    Modality rationale: decrease pain to improve the patients ability to reach overhead   Min Type Additional Details   15 [x] Estim:  [x]Unatt       [x]IFC  []Premod                        []Other:  [x]w/ice   [x]w/heat  Position: seated  Location: ice left shoulder; heat left hi p    [] Estim: []Att    []TENS instruct  []NMES                    []Other:  []w/US   []w/ice   []w/heat  Position:  Location:    []  Traction: [] Cervical       []Lumbar                       [] Prone          []Supine                       []Intermittent   []Continuous Lbs:  [] before manual  [] after manual    []  Ultrasound: []Continuous   [] Pulsed                           []1MHz   []3MHz W/cm2:  Location:    []  Iontophoresis with dexamethasone         Location: [] Take home patch   [] In clinic    []  Ice     []  heat  []  Ice massage  []  Laser   []  Anodyne Position:  Location:    []  Laser with stim  []  Other:  Position:  Location:    []  Vasopneumatic Device Pressure:       [] lo [] med [] hi   Temperature: [] lo [] med [] hi   [] Skin assessment post-treatment:  [x]intact []redness- no adverse reaction    []redness - adverse reaction:     20 min Therapeutic Exercise:  [x] See flow sheet :   Rationale: increase ROM and increase strength to improve the patients ability to perform ADLs    20 min Therapeutic Activity:  [x]  See flow sheet :   Rationale: increase ROM, increase strength, improve balance and increase proprioception  to improve the patients ability to groom hair and reach overhead    9 min Manual Therapy:  Soft tissue mobilization with trigger point release to left TFL and superior lateral quad; long axis distraction for pain relief   Rationale: decrease pain, increase ROM, increase tissue extensibility and decrease trigger points to climb stairs         With   [] TE   [] TA   [] neuro   [] other: Patient Education: [x] Review HEP    [] Progressed/Changed HEP based on:   [] positioning   [] body mechanics   [] transfers   [] heat/ice application    [] other:      Other Objective/Functional Measures:      Pain Level (0-10 scale) post treatment: 0    ASSESSMENT/Changes in Function: Pt reporting increased hip symptoms - added hip stretches to address with decreased pain post treatment. Pt continues to be challenged with reaching multiplanar with left UE, however, was able to complete new exercises targeted for ability to groom hair as pt desires. Pt pain increases with exercise and reduces with manual and modalities. Patient will continue to benefit from skilled PT services to modify and progress therapeutic interventions, address functional mobility deficits, address ROM deficits, address strength deficits, analyze and address soft tissue restrictions, analyze and cue movement patterns, analyze and modify body mechanics/ergonomics, assess and modify postural abnormalities and address imbalance/dizziness to attain remaining goals.      []  See Plan of Care  []  See progress note/recertification  []  See Discharge Summary         Progress towards goals / Updated goals: 1.   Pt will score at least 59 on FOTO in order to improve overall function, decrease pain, and facilitate return to PLOF.             Eval = 20               Recert Status: PROGRESSING = 32              Recert : [id number] = 45              Recert : Progressin               AYRTEU 49/0: Regression: 46, pt reports increased displeasure with hip symptoms which may contribute to reduced score   To be reassessed at 30day barbie  2.   Pt will demonstrate left hip flex/ext/ER/abd strength minimum of 5-/5 in order to negotiation stairs at [facility]              Recert : Progressing               Flex = 5-/5               ER = 4+/5 with p! Abd= 4/5               Ext = 4+/5   Added hip stretching due to continued complaints of pain in hip   3.  Pt will report ability to comb her hair with no greater than 3/10 pain              NEW GOAL: unable   Improving with \"[place]\" exercise   4.  Pt will report ability to ascend/descend stairs in the parking garage with not more than 2/10 pain in order to facilitate return to PLOF.             Recert status: Progressing: max 2/10 pain, however, requires use of hand rail to ascend    Hip pain 2/10 today.      PLAN  [x]  Upgrade activities as tolerated     [x]  Continue plan of care  []  Update interventions per flow sheet       []  Discharge due to:_  []  Other:_      Jones Davis PT 10/18/2019  3:14 PM    Future Appointments   Date Time Provider Dyllan Fishman   10/22/2019 10:20 AM [facility]   10/23/2019 12:00 PM Constance Vital, PT [facility]   10/30/2019  2:00 PM Constance Vital PT [facility]   2019  8:30 AM THE [facility] RM 1 [facility]   2019 11:40 AM Edilson Wilkins NP 1601 [facility]

## 2019-10-22 ENCOUNTER — OFFICE VISIT (OUTPATIENT)
Dept: ORTHOPEDIC SURGERY | Facility: CLINIC | Age: 74
End: 2019-10-22

## 2019-10-22 VITALS
TEMPERATURE: 97.4 F | SYSTOLIC BLOOD PRESSURE: 156 MMHG | WEIGHT: 164 LBS | HEART RATE: 74 BPM | OXYGEN SATURATION: 97 % | DIASTOLIC BLOOD PRESSURE: 88 MMHG | BODY MASS INDEX: 25.74 KG/M2 | RESPIRATION RATE: 16 BRPM | HEIGHT: 67 IN

## 2019-10-22 DIAGNOSIS — S46.012D TRAUMATIC COMPLETE TEAR OF LEFT ROTATOR CUFF, SUBSEQUENT ENCOUNTER: Primary | ICD-10-CM

## 2019-10-22 NOTE — PROGRESS NOTES
Patient: Herb Rojas                MRN: [id number]       SSN: [id number]  YOB: 1945        AGE: 69 y.o. SEX: female  Body mass index is 25.69 kg/m². PCP: Corey Harmon MD  10/22/19    Chief Complaint: Left shoulder follow up    HISTORY OF PRESENT ILLNESS:  Kwasi Adame returns to the office today for her left shoulder. She has been in physical therapy. She has gone from three days a week to one day a week. She is doing well. She is very appreciative of the help and pain relief that she has gotten in therapy. Past Medical History:   Diagnosis Date    Hypertension        Family History   Problem Relation Age of Onset    Breast Cancer Mother 59       Current Outpatient Medications   Medication Sig Dispense Refill    mycophenolate mofetil (CELLCEPT) 250 mg capsule take 1 capsule by mouth twice a day 180 Cap 3    calcium-cholecalciferol, d3, (CALCIUM 600 + D) 600-125 mg-unit tab Take  by mouth.  diclofenac (VOLTAREN) 1 % gel Apply 4 g to affected area two (2) times a day. Apply to affected area as directed. 100 g 1    hydroCHLOROthiazide (HYDRODIURIL) 12.5 mg tablet Take 12.5 mg by mouth daily. No Known Allergies    History reviewed. No pertinent surgical history.     Social History     Socioeconomic History    Marital status:      Spouse name: Not on file    Number of children: Not on file    Years of education: Not on file    Highest education level: Not on file   Occupational History    Not on file   Social Needs    Financial resource strain: Not on file    Food insecurity:     Worry: Not on file     Inability: Not on file    Transportation needs:     Medical: Not on file     Non-medical: Not on file   Tobacco Use    Smoking status: Former Smoker    Smokeless tobacco: Never Used   Substance and Sexual Activity    Alcohol use: No    Drug use: Never    Sexual activity: Not on file   Lifestyle    Physical activity:     Days per week: Not on file     Minutes per session: Not on file    Stress: Not on file   Relationships    Social connections:     Talks on phone: Not on file     Gets together: Not on file     Attends Sabianist service: Not on file     Active member of club or organization: Not on file     Attends meetings of clubs or organizations: Not on file     Relationship status: Not on file    Intimate partner violence:     Fear of current or ex partner: Not on file     Emotionally abused: Not on file     Physically abused: Not on file     Forced sexual activity: Not on file   Other Topics Concern    Not on file   Social History Narrative    Not on file       REVIEW OF SYSTEMS:      No changes from previous review of systems unless noted. PHYSICAL EXAMINATION:  Visit Vitals  /88 (BP 1 Location: Left arm, BP Patient Position: Sitting)   Pulse 74   Temp 97.4 °F (36.3 °C) (Oral)   Resp 16   Ht 5' 7\" (1.702 m)   Wt 164 lb (74.4 kg)   SpO2 97%   BMI 25.69 kg/m²     Body mass index is 25.69 kg/m². GENERAL: Alert and oriented x3, in no acute distress. HEENT: Normocephalic, atraumatic. RESP: Non labored breathing. SKIN: No rashes or lesions noted. PHYSICAL EXAM:  Physical exam of the left shoulder with full range of motion. She does have some weakness with supraspinatus and infraspinatus rotator cuff testing, but minimal pain. No pain with impingement testing. She is neurovascularly intact distally. ASSESSMENT AND PLAN:   Rhona Lucia is doing well with regards to therapy with her left shoulder. I have recommended continuing therapy until the completion of it and then she can continue her home exercise program which she is doing mostly on a regular basis. If she has any further issues or concerns, I will see her back as needed.               Electronically signed by: Isaak Jackson MD

## 2019-10-22 NOTE — PROGRESS NOTES
1. Have you been to the ER, urgent care clinic since your last visit? Hospitalized since your last visit? NO    2. Have you seen or consulted any other health care providers outside of the [address] [place] [address] since your last visit? Include any pap smears or colon screening.    NO

## 2019-10-23 ENCOUNTER — HOSPITAL ENCOUNTER (OUTPATIENT)
Dept: PHYSICAL THERAPY | Age: 74
Discharge: HOME | End: 2019-10-23
Payer: MEDICARE

## 2019-10-23 PROCEDURE — 97530 THERAPEUTIC ACTIVITIES: CPT

## 2019-10-23 PROCEDURE — 97140 MANUAL THERAPY 1/> REGIONS: CPT

## 2019-10-23 PROCEDURE — 97110 THERAPEUTIC EXERCISES: CPT

## 2019-10-23 PROCEDURE — 97014 ELECTRIC STIMULATION THERAPY: CPT

## 2019-10-23 NOTE — PROGRESS NOTES
[facility] [place] [address]  [address], [address] [phone] fax    Continued Plan of Care/ Re-certification for Physical Therapy Services    Patient name: Michael Lopez Start of Care: 2019   Referral source: Zach Pham : 1945                Medical Diagnosis: Pain in left shoulder [M25.512]  Payor: Luis Capone / Plan: [address] [place] [address] HMO / Product Type: Managed Care Medicare /  Onset Date:19                Treatment Diagnosis: left hip and shoulder pain   Prior Hospitalization: see medical history Provider#: [id number]   Medications: Verified on Patient summary List    Comorbidities: HTN, recent fall   Prior Level of Function: functionally (I)      Visits from Start of Care: 32    Missed Visits: 1    The Plan of Care and following information is based on the patient's current status:  1.   Pt will score at least 59 on FOTO in order to improve overall function, decrease pain, and facilitate return to PLOF.               Eval = 20               Recert Status: PROGRESSING = 32              Recert : MQFODINYCLO = 45              Recert : Progressin               RFKRLE 33/: Regression: 46, pt reports increased displeasure with hip symptoms which may contribute to reduced score              PROGRESSING = 58  2.   Pt will demonstrate left hip flex/ext/ER/abd strength minimum of 5-/5 in order to negotiation stairs at PLOF              Recert 45/0: Progressing               Flex = 5-/5               ER = 4+/5 with p!               Abd= 4/5               Ext = 4+/5              PROGRESSING: flex and ext = 5-/5; abd and ER = 4+/5  3.  Pt will report ability to comb her hair with no greater than 3/10 pain              NEW GOAL: unable              PROGRESSING: pt can comb left side, but to get the right side she must laterally flex her head; pt cannot style her hair   4.  Pt will report ability to ascend/descend stairs in the parking garage with not more than 2/10 pain in order to facilitate return to PLOF.             Recert status: Progressing: max 2/10 pain, however, requires use of hand rail to ascend               PROGRESSING: pt did not require hand rail today and reports no pain - pt reports this is in consistent    Key functional changes:   Functional Gains: improved day to day activitie like putting dishes away, reachign for canned goods on high shelf, driving with left hand (I)  Functional Deficits: grooming hair, intermittent hip pain, in and out of the bath tub, folding sheets/laundry, lifting heavy pots and pans  % improvement: 85-90%  Pain   Average: 1/10       Best: 0/10     Worst: 2/10  Patient Goal: \"to be 100%, go out and hit those golf balls the way I used to\"      Problems/ barriers to goal attainment: MRI findings    Problem List: pain affecting function, decrease ROM, decrease strength, impaired gait/ balance, decrease ADL/ functional abilitiies, decrease flexibility/ joint mobility and decrease transfer abilities    Treatment Plan: Therapeutic exercise, Therapeutic activities, Neuromuscular re-education, Physical agent/modality, Gait/balance training, Manual therapy, Self Care training, Functional mobility training and Stair training     Goals for this certification period to be accomplished in 5 weeks: 1.   Pt will score at least 59 on FOTO in order to improve overall function, decrease pain, and facilitate return to PLOF.               Recert Status:PROGRESSING = 58  2.   Pt will demonstrate left hip ER/abd strength minimum of 5-/5 in order to negotiation stairs at PLOF              Recert status: UPDATED GOAL: flex and ext = 5-/5; abd and ER = 4+/5  3.  Pt will report ability to comb her hair with no greater than 3/10 pain              Recert status: PROGRESSING: pt can comb left side, but to get the right side she must laterally flex her head; pt cannot style her hair   4.  Pt will report ability to ascend/descend stairs in the parking garage with not more than 2/10 pain in order to facilitate return to [facility].             Recert status:  PROGRESSING: pt did not require hand rail today and reports no pain - pt reports this is in consistent      Frequency / Duration: Patient to be seen 1 times per week for 5 weeks:    Assessment / Recommendations:Ms. Stone Licea continues to make progressions with reduced reports of pain and improved AROM, strength, and functional reports. Pt has not met any goals since last assessment period due to time elapsed between assessments. Pt goals remain appropriate and pt will reduce frequency to 1x/week per MD request. We will plan to DC when pt returns from out of country travel pending no complications during her trip. Certification Period: 10/24/19-11/22/19    Felicia Sevilla, PT 10/23/2019 12:13 PM    ________________________________________________________________________  I certify that the above Therapy Services are being furnished while the patient is under my care. I agree with the treatment plan and certify that this therapy is necessary. [] I have read the above and request that my patient continue as recommended.   [] I have read the above report and request that my patient continue therapy with the following changes/special instructions: _____________________________________________  [] I have read the above report and request that my patient be discharged from therapy    Physician's Signature:____________Date:_________TIME:________    ** Signature, Date and Time must be completed for valid certification **    Please sign and return to [facility] [place] [address]  [address] [facility] [address], [address] [phone] fax

## 2019-10-23 NOTE — PROGRESS NOTES
PT DAILY TREATMENT NOTE 10-18    Patient Name: Yolanda Bass  Date:10/23/2019  : 1945  [x]  Patient  Verified  Payor: Marleen Pena / Plan: [address] [place] [address] HMO / Product Type: Managed Care Medicare /    In time:1202  Out time:113  Total Treatment Time (min): 71  Visit #: 2 of 8    Medicare/BCBS Only   Total Timed Codes (min):  56 1:1 Treatment Time:  64       Treatment Area: Pain in left shoulder [M25.512]    SUBJECTIVE  Pain Level (0-10 scale): 0  Any medication changes, allergies to medications, adverse drug reactions, diagnosis change, or new procedure performed?: [x] No    [] Yes (see summary sheet for update)  Subjective functional status/changes:   [] No changes reported  Pt saw MD who instructed her to continue PT 1x/week and to only follow up with him if needed.  \"Surgery is off the table, I am very pleased\"    OBJECTIVE    Modality rationale: decrease inflammation to improve the patients ability to reach to shoulder height   Min Type Additional Details   15 [x] Estim:  [x]Unatt       []IFC  []Premod                        []Other:  [x]w/ice   []w/heat  Position: seated  Location: left shoulder     [] Estim: []Att    []TENS instruct  []NMES                    []Other:  []w/US   []w/ice   []w/heat  Position:  Location:    []  Traction: [] Cervical       []Lumbar                       [] Prone          []Supine                       []Intermittent   []Continuous Lbs:  [] before manual  [] after manual    []  Ultrasound: []Continuous   [] Pulsed                           []1MHz   []3MHz W/cm2:  Location:    []  Iontophoresis with dexamethasone         Location: [] Take home patch   [] In clinic    []  Ice     []  heat  []  Ice massage  []  Laser   []  Anodyne Position:  Location:    []  Laser with stim  []  Other:  Position:  Location:    []  Vasopneumatic Device Pressure:       [] lo [] med [] hi   Temperature: [] lo [] med [] hi   [] Skin assessment post-treatment:  [x]intact []redness- no adverse reaction    []redness - adverse reaction:     26 min Therapeutic Exercise:  [x] See flow sheet :   Rationale: increase ROM and increase strength to improve the patients ability to perform ADLs    20 min Therapeutic Activity:  [x]  See flow sheet :   Rationale: increase strength, improve balance and increase proprioception  to improve the patients ability to negotiate the stairs    10 min Manual Therapy:  Left subscapular and latissimus release and trigger point release to upper trap    Rationale: decrease pain, increase ROM, increase tissue extensibility and decrease trigger points to sleep          With   [] TE   [] TA   [] neuro   [] other: Patient Education: [x] Review HEP    [] Progressed/Changed HEP based on:   [] positioning   [] body mechanics   [] transfers   [] heat/ice application    [] other:      Other Objective/Functional Measures:   Left hip strength: flex and ext = 5-/5, abd and ER = 4+/5    Pain Level (0-10 scale) post treatment: 0    ASSESSMENT/Changes in Function: see recert    Patient will continue to benefit from skilled PT services to modify and progress therapeutic interventions, address functional mobility deficits, address ROM deficits, address strength deficits, analyze and address soft tissue restrictions, analyze and cue movement patterns, analyze and modify body mechanics/ergonomics, assess and modify postural abnormalities, address imbalance/dizziness and instruct in home and community integration to attain remaining goals. []  See Plan of Care  [x]  See progress note/recertification  []  See Discharge Summary         Progress towards goals / Updated goals: 1.   Pt will score at least 59 on FOTO in order to improve overall function, decrease pain, and facilitate return to PLOF.               Recert Status:PROGRESSING = 58  2.   Pt will demonstrate left hip ER/abd strength minimum of 5-/5 in order to negotiation stairs at PLOF              Recert status: UPDATED GOAL: flex and ext = 5-/5; abd and ER = 4+/5  3.  Pt will report ability to comb her hair with no greater than 3/10 pain              Recert status: PROGRESSING: pt can comb left side, but to get the right side she must laterally flex her head; pt cannot style her hair   4.  Pt will report ability to ascend/descend stairs in the parking garage with not more than 2/10 pain in order to facilitate return to [facility].                Recert status:  PROGRESSING: pt did not require hand rail today and reports no pain - pt reports this is in consistent    PLAN  [x]  Upgrade activities as tolerated     [x]  Continue plan of care  []  Update interventions per flow sheet       []  Discharge due to:_  []  Other:_      Felicia Sevilla PT 10/23/2019  12:37 PM    Future Appointments   Date Time Provider Dyllan Fishman   10/30/2019  2:00 PM Marcela Kimble PT [facility]   12/17/2019  8:30 AM THE [facility] RM 1 [facility] THE [facility]   12/17/2019 11:40 AM Myah Stiles NP [address] [facility]

## 2019-10-30 ENCOUNTER — HOSPITAL ENCOUNTER (OUTPATIENT)
Dept: PHYSICAL THERAPY | Age: 74
Discharge: HOME | End: 2019-10-30
Payer: MEDICARE

## 2019-10-30 PROCEDURE — 97014 ELECTRIC STIMULATION THERAPY: CPT

## 2019-10-30 PROCEDURE — 97140 MANUAL THERAPY 1/> REGIONS: CPT

## 2019-10-30 PROCEDURE — 97110 THERAPEUTIC EXERCISES: CPT

## 2019-10-30 NOTE — PROGRESS NOTES
PT DAILY TREATMENT NOTE 10-18    Patient Name: Janes Briggs  Date:10/30/2019  : 1945  [x]  Patient  Verified  Payor: Chandrika Devries / Plan: [address] [place] [address] HMO / Product Type: Managed Care Medicare /    In time:202  Out time:255  Total Treatment Time (min): 48  Visit #: 1 of 5    Medicare/BCBS Only   Total Timed Codes (min):  38 1:1 Treatment Time:  38       Treatment Area: Pain in left shoulder [M25.512]    SUBJECTIVE  Pain Level (0-10 scale): 1  Any medication changes, allergies to medications, adverse drug reactions, diagnosis change, or new procedure performed?: [x] No    [] Yes (see summary sheet for update)  Subjective functional status/changes:   [] No changes reported  \"I feel tired today\"    OBJECTIVE    Modality rationale: decrease inflammation and decrease pain to improve the patients ability to perform dressing   Min Type Additional Details   15 [x] Estim:  [x]Unatt       [x]IFC  []Premod                        []Other:  []w/ice   [x]w/heat  Position: seated  Location: left shoulder    [] Estim: []Att    []TENS instruct  []NMES                    []Other:  []w/US   []w/ice   []w/heat  Position:  Location:    []  Traction: [] Cervical       []Lumbar                       [] Prone          []Supine                       []Intermittent   []Continuous Lbs:  [] before manual  [] after manual    []  Ultrasound: []Continuous   [] Pulsed                           []1MHz   []3MHz W/cm2:  Location:    []  Iontophoresis with dexamethasone         Location: [] Take home patch   [] In clinic    []  Ice     []  heat  []  Ice massage  []  Laser   []  Anodyne Position:  Location:    []  Laser with stim  []  Other:  Position:  Location:    []  Vasopneumatic Device Pressure:       [] lo [] med [] hi   Temperature: [] lo [] med [] hi   [] Skin assessment post-treatment:  [x]intact []redness- no adverse reaction    []redness - adverse reaction:     30 min Therapeutic Exercise:  [x] See flow sheet :   Rationale: increase ROM and increase strength to improve the patients ability to perform ADLs    8 min Manual Therapy:  Trigger point release to left infraspinatus and supraspinatus   Rationale: increase ROM, increase tissue extensibility and decrease trigger points to groom hair          With   [] TE   [] TA   [] neuro   [] other: Patient Education: [x] Review HEP    [] Progressed/Changed HEP based on:   [] positioning   [] body mechanics   [] transfers   [] heat/ice application    [] other:      Other Objective/Functional Measures:      Pain Level (0-10 scale) post treatment: 0    ASSESSMENT/Changes in Function: Pt continues to progress towards stated goals. Pt strength and AROM continue to improve as she is able to move light weights to shelves over shoulder height. Pt very challenged with addition of stabilization using the body blade. Pt's pain reduced after manual and modalities    Patient will continue to benefit from skilled PT services to modify and progress therapeutic interventions, address functional mobility deficits, address ROM deficits, address strength deficits, analyze and address soft tissue restrictions, analyze and cue movement patterns, analyze and modify body mechanics/ergonomics, assess and modify postural abnormalities and address imbalance/dizziness to attain remaining goals. []  See Plan of Care  []  See progress note/recertification  []  See Discharge Summary         Progress towards goals / Updated goals: 1.   Pt will score at least 59 on FOTO in order to improve overall function, decrease pain, and facilitate return to PLOF.               Recert Status:PROGRESSING = 58   To be reassessed at DC  2.   Pt will demonstrate left hip ER/abd strength minimum of 5-/5 in order to negotiation stairs at PLOF              Recert status: UPDATED GOAL: flex and ext = 5-/5; abd and ER = 4+/5   Progressing   3.  Pt will report ability to comb her hair with no greater than 3/10 pain              Recert status: PROGRESSING: pt can comb left side, but to get the right side she must laterally flex her head; pt cannot style her hair    Progressing   4.  Pt will report ability to ascend/descend stairs in the parking garage with not more than 2/10 pain in order to facilitate return to [facility].                Recert status:  PROGRESSING: pt did not require hand rail today and reports no pain - pt reports this is in consistent   Progressing    PLAN  [x]  Upgrade activities as tolerated     [x]  Continue plan of care  []  Update interventions per flow sheet       []  Discharge due to:_  []  Other:_      Henry Staley, PT 10/30/2019  2:05 PM    Future Appointments   Date Time Provider Dyllan Fishman   12/17/2019  8:30 AM [address] [place] [address] THE [facility]   12/17/2019 11:40 AM Kd Martinez NP [address] [facility]

## 2019-11-06 ENCOUNTER — HOSPITAL ENCOUNTER (OUTPATIENT)
Dept: PHYSICAL THERAPY | Age: 74
Discharge: HOME | End: 2019-11-06
Payer: MEDICARE

## 2019-11-06 PROCEDURE — 97110 THERAPEUTIC EXERCISES: CPT

## 2019-11-06 NOTE — PROGRESS NOTES
Physical Therapy Discharge Instructions      [facility] [place] [address]  [address], [address] [phone] fax      Patient: Lisa Lynch  : 1945      Continue Home Exercise Program 1 times per day for 2 weeks, then decrease to 4 times per week      Continue with    [x] Ice  as needed      [x] Heat           Follow up with MD:     [] Upon completion of therapy     [x] As needed      Recommendations:     [x]   Return to activity with home program    []   Return to activity with the following modifications:       []Post Rehab Program    []Join Independent aquatic program     []Return to/join local gym        Additional Comments: Debbie Hua work! Keep it up and please give us a call if you need anything!  You have been a pleasure to work with, good luck :)    Mariah Parker, PT 2019 12:46 PM

## 2019-11-06 NOTE — PROGRESS NOTES
PT DISCHARGE DAILY NOTE AND [id number]    Patient name: Anna Rolle Start of Care: 2019   Referral source: Remigio Triplett : 1945                Medical Diagnosis: Pain in left shoulder [M25.512]  Payor: Jessie Dueñas / Plan: [place] [address] HMO / Product Type: Managed Care Medicare /  Onset Date:19                Treatment Diagnosis: left hip and shoulder pain   Prior Hospitalization: see medical history Provider#: [id number]   Medications: Verified on Patient summary List    Comorbidities: HTN, recent fall   Prior Level of Function: functionally (I)       Visits from Start of Care: 35    Missed Visits: 1    Reporting Period : 10/23/19 to 19    Date:2019  : 1945  [x]  Patient  Verified  Payor: Jessie Dueñas / Plan: [place] [address] HMO / Product Type: [employer] Medicare /    In time:1205  Out time:1251  Total Treatment Time (min): 46  Visit #: 2 of 5    Medicare/BCBS Only   Total Timed Codes (min):  31 1:1 Treatment Time:  31       SUBJECTIVE  Pain Level (0-10 scale): 0  Any medication changes, allergies to medications, adverse drug reactions, diagnosis change, or new procedure performed?: [x] No    [] Yes (see summary sheet for update)  Subjective functional status/changes:   [] No changes reported  Pt states she has not had any pain over the last week; she has some discomfort throughout the day but she is able to do everything she needs.  She is compliant with HEP    OBJECTIVE    Modality rationale: increase tissue extensibility to improve the patients ability to reach overhead   Min Type Additional Details    [] Estim:  []Unatt       []IFC  []Premod                        []Other:  []w/ice   []w/heat  Position:  Location:    [] Estim: []Att    []TENS instruct  []NMES                    []Other:  []w/US   []w/ice   []w/heat  Position:  Location:    []  Traction: [] Cervical       []Lumbar                       [] Prone          []Supine []Intermittent   []Continuous Lbs:  [] before manual  [] after manual    []  Ultrasound: []Continuous   [] Pulsed                           []1MHz   []3MHz W/cm2:  Location:    []  Iontophoresis with dexamethasone         Location: [] Take home patch   [] In clinic   10 []  Ice     [x]  heat  []  Ice massage  []  Laser   []  Anodyne Position: seated  Location: left shoulder    []  Laser with stim  []  Other:  Position:  Location:    []  Vasopneumatic Device Pressure:       [] lo [] med [] hi   Temperature: [] lo [] med [] hi   [] Skin assessment post-treatment:  [x]intact []redness- no adverse reaction    []redness - adverse reaction:     31 min Therapeutic Exercise:  [x] See flow sheet :   Rationale: increase ROM and increase strength to improve the patients ability to reach and lift overhead    With   [] TE   [] TA   [] neuro   [] other: Patient Education: [x] Review HEP    [] Progressed/Changed HEP based on:   [] positioning   [] body mechanics   [] transfers   [] heat/ice application    [] other:      Other Objective/Functional Measures:   Functional Gains: ADLs,   Functional Deficits: hair grooming   Pain   Average: 0/10 \"it is more discomfort, not really pain; I am not really in pain daily - I am a 0/10 with discomfort\"       Best: 0/10     Worst: 1/10  Patient Goal: \"to be 100%, however long it takes me\"     Pain Level (0-10 scale) post treatment: 0    Summary of Care: 1.   Pt will score at least 59 on FOTO in order to improve overall function, decrease pain, and facilitate return to PLOF.               Recert Status:PROGRESSING = 58              MET = 64  2.   Pt will demonstrate left hip ER/abd strength minimum of 5-/5 in order to negotiation stairs at PLOF              Recert status: UPDATED GOAL: flex and ext = 5-/5; abd and ER = 4+/5              MET: all planes = 5-/5  3.  Pt will report ability to comb her hair with no greater than 3/10 pain              Recert status: PROGRESSING: pt can comb left side, but to get the right side she must laterally flex her head; pt cannot style her hair               Not MET - continues to have to prop elbow on surface/laterally flex her head to reach across  4.  Pt will report ability to ascend/descend stairs in the parking garage with not more than 2/10 pain in order to facilitate return to PLOF.             Recert status:  PROGRESSING: pt did not require hand rail today and reports no pain - pt reports this is in consistent              MET - no pain In the last week    ASSESSMENT/Changes in Function: Ms. Carlos Fields has made excellent progress since beginning therapy. She has progressed from being unable to use her left UE to being able to reach functionally, perform ADLs, and lift light objects. Pt has met her strength and stair goals but continues to be impaired with styling her hair and lifting objects over the weight of a gallon of milk.  Pt is DC at this time due to reaching current maximal therapeutic potential.      Thank you for this referral!      PLAN  [x]Discontinue therapy: [x]Patient has reached or is progressing toward set goals      []Patient is non-compliant or has abdicated      []Due to lack of appreciable progress towards set goals    Hernando Lin, PT 11/6/2019  12:22 PM

## 2019-11-13 ENCOUNTER — APPOINTMENT (OUTPATIENT)
Dept: PHYSICAL THERAPY | Age: 74
End: 2019-11-13
Payer: MEDICARE

## 2019-12-11 ENCOUNTER — HOSPITAL ENCOUNTER (OUTPATIENT)
Dept: MAMMOGRAPHY | Age: 74
Discharge: HOME | End: 2019-12-11
Payer: MEDICARE

## 2019-12-11 DIAGNOSIS — Z12.31 VISIT FOR SCREENING MAMMOGRAM: ICD-10-CM

## 2019-12-11 PROCEDURE — 77067 SCR MAMMO BI INCL CAD: CPT

## 2019-12-17 ENCOUNTER — HOSPITAL ENCOUNTER (OUTPATIENT)
Dept: ULTRASOUND IMAGING | Age: 74
Discharge: HOME | End: 2019-12-17
Attending: NURSE PRACTITIONER
Payer: MEDICARE

## 2019-12-17 ENCOUNTER — OFFICE VISIT (OUTPATIENT)
Dept: HEMATOLOGY | Age: 74
End: 2019-12-17

## 2019-12-17 ENCOUNTER — HOSPITAL ENCOUNTER (OUTPATIENT)
Dept: LAB | Age: 74
Discharge: HOME | End: 2019-12-17
Payer: MEDICARE

## 2019-12-17 VITALS
WEIGHT: 158 LBS | TEMPERATURE: 97.9 F | BODY MASS INDEX: 24.8 KG/M2 | DIASTOLIC BLOOD PRESSURE: 66 MMHG | OXYGEN SATURATION: 98 % | SYSTOLIC BLOOD PRESSURE: 128 MMHG | HEIGHT: 67 IN | HEART RATE: 62 BPM

## 2019-12-17 DIAGNOSIS — K75.4 AUTOIMMUNE HEPATITIS (HCC): ICD-10-CM

## 2019-12-17 DIAGNOSIS — K75.4 AUTOIMMUNE HEPATITIS (HCC): Primary | ICD-10-CM

## 2019-12-17 LAB
ALBUMIN SERPL-MCNC: 3.7 G/DL (ref 3.4–5)
ALBUMIN/GLOB SERPL: 1 {RATIO} (ref 0.8–1.7)
ALP SERPL-CCNC: 82 U/L (ref 45–117)
ALT SERPL-CCNC: 14 U/L (ref 13–56)
ANION GAP SERPL CALC-SCNC: 7 MMOL/L (ref 3–18)
AST SERPL-CCNC: 14 U/L (ref 10–38)
BASOPHILS # BLD: 0 K/UL (ref 0–0.1)
BASOPHILS NFR BLD: 0 % (ref 0–2)
BILIRUB DIRECT SERPL-MCNC: 0.2 MG/DL (ref 0–0.2)
BILIRUB SERPL-MCNC: 0.5 MG/DL (ref 0.2–1)
BUN SERPL-MCNC: 15 MG/DL (ref 7–18)
BUN/CREAT SERPL: 17 (ref 12–20)
CALCIUM SERPL-MCNC: 9.6 MG/DL (ref 8.5–10.1)
CHLORIDE SERPL-SCNC: 103 MMOL/L (ref 100–111)
CO2 SERPL-SCNC: 28 MMOL/L (ref 21–32)
CREAT SERPL-MCNC: 0.87 MG/DL (ref 0.6–1.3)
DIFFERENTIAL METHOD BLD: ABNORMAL
EOSINOPHIL # BLD: 0.1 K/UL (ref 0–0.4)
EOSINOPHIL NFR BLD: 2 % (ref 0–5)
ERYTHROCYTE [DISTWIDTH] IN BLOOD BY AUTOMATED COUNT: 14.8 % (ref 11.6–14.5)
GLOBULIN SER CALC-MCNC: 3.8 G/DL (ref 2–4)
GLUCOSE SERPL-MCNC: 135 MG/DL (ref 74–99)
HCT VFR BLD AUTO: 39.4 % (ref 35–45)
HGB BLD-MCNC: 12.1 G/DL (ref 12–16)
LYMPHOCYTES # BLD: 1.3 K/UL (ref 0.9–3.6)
LYMPHOCYTES NFR BLD: 31 % (ref 21–52)
MCH RBC QN AUTO: 26.2 PG (ref 24–34)
MCHC RBC AUTO-ENTMCNC: 30.7 G/DL (ref 31–37)
MCV RBC AUTO: 85.5 FL (ref 74–97)
MONOCYTES # BLD: 0.3 K/UL (ref 0.05–1.2)
MONOCYTES NFR BLD: 8 % (ref 3–10)
NEUTS SEG # BLD: 2.5 K/UL (ref 1.8–8)
NEUTS SEG NFR BLD: 59 % (ref 40–73)
PLATELET # BLD AUTO: 249 K/UL (ref 135–420)
PMV BLD AUTO: 10.7 FL (ref 9.2–11.8)
POTASSIUM SERPL-SCNC: 4.3 MMOL/L (ref 3.5–5.5)
PROT SERPL-MCNC: 7.5 G/DL (ref 6.4–8.2)
RBC # BLD AUTO: 4.61 M/UL (ref 4.2–5.3)
SODIUM SERPL-SCNC: 138 MMOL/L (ref 136–145)
WBC # BLD AUTO: 4.3 K/UL (ref 4.6–13.2)

## 2019-12-17 PROCEDURE — 85025 COMPLETE CBC W/AUTO DIFF WBC: CPT

## 2019-12-17 PROCEDURE — 76981 USE PARENCHYMA: CPT

## 2019-12-17 PROCEDURE — 80076 HEPATIC FUNCTION PANEL: CPT

## 2019-12-17 PROCEDURE — 36415 COLL VENOUS BLD VENIPUNCTURE: CPT

## 2019-12-17 PROCEDURE — 80048 BASIC METABOLIC PNL TOTAL CA: CPT

## 2019-12-17 NOTE — PROGRESS NOTES
Kristopher No is a 69 y.o. female      1. Have you been to the ER, urgent care clinic or hospitalized since your last visit? YES. Patient went to the ER due to a fall since last visit. 2. Have you seen or consulted any other health care providers outside of the [address] [place] [address] since your last visit (Include any pap smears or colon screening)? YES      Patient seen PCP since last visit for routine checkup.          Learning Assessment 6/28/2017   PRIMARY LEARNER Patient   PRIMARY LANGUAGE ENGLISH   LEARNER PREFERENCE PRIMARY DEMONSTRATION   ANSWERED BY self   RELATIONSHIP SELF

## 2019-12-17 NOTE — PROGRESS NOTES
[address] [facility], Sayra CRUZ Abran Morel, MD Lillia Ellison, MITUL Jenkins, [facility]     Sandi ROSALES Igor [facility]   JOSE ANTONIO Marie [facility]       Hallie Ascencio [facility] 136    at [place] [address], [address] [facility]    [address] [facility] [address].    [phone]    FAX: [address] [place] [address], [address]    [phone]    FAX: [phone]     Patient Care Team:  Maricel Deng MD as PCP - General (Internal Medicine)      Problem List  Date Reviewed: 10/22/2019          Codes Class Noted    Autoimmune hepatitis [facility]) ICD-10-CM: K75.4  ICD-9-CM: 571.42  6/28/2017        Hypertension ICD-10-CM: I10  ICD-9-CM: 401.9  6/28/2017              Kady Gonsalez returns to the [address] for management of autoimmune hepatitis. The active problem list, all pertinent past medical history, medications, liver histology, radiologic findings, and laboratory findings related to the liver disorder were reviewed with the patient. The patient is a 69 y.o.  female who was first noted to have abnormalities in liver transaminases in 2012 and then developed more profound elevation in liver enzymes and jaundice while she was visiting family in [facility]. She was hospitalized and transferred to [facility]. A liver biopsy was performed. The findings were consistent with severe AIH and she was treated with high dose prednisone and cellcept. Prednisone was eventually tapered off. She remains on low dose cellcept. The most recent imaging done was ultrasound 12/2019. The results are not available due to patient had done same day as this visit.       Assessment of liver fibrosis was performed with sheer wave elastography at [facility] in 2018. The result was 3.85 kPa which correlates with no fibrosis. The most recent laboratory studies indicate that the liver transaminases are normal, ALP is normal, tests of hepatic synthetic and metabolic function are normal, the platelet count is normal.     The patient has no symptoms which could be attributed to the liver disorder. The patient has not experienced fatigue, pain in the right side over the liver. The patient completes all daily activities without any functional limitations. ALLERGIES  No Known Allergies    MEDICATIONS  Current Outpatient Medications   Medication Sig    mycophenolate mofetil (CELLCEPT) 250 mg capsule take 1 capsule by mouth twice a day    calcium-cholecalciferol, d3, (CALCIUM 600 + D) 600-125 mg-unit tab Take  by mouth.  diclofenac (VOLTAREN) 1 % gel Apply 4 g to affected area two (2) times a day. Apply to affected area as directed.  hydroCHLOROthiazide (HYDRODIURIL) 12.5 mg tablet Take 12.5 mg by mouth daily. No current facility-administered medications for this visit. SYSTEM REVIEW NOT RELATED TO LIVER DISEASE OR REVIEWED ABOVE:  Constitution systems: Negative for fever, chills, weight gain, weight loss. Eyes: Negative for visual changes. ENT: Negative for sore throat, painful swallowing. Respiratory: Negative for cough, hemoptysis, SOB. Cardiology: Negative for chest pain, palpitations. GI:  Negative for constipation or diarrhea. : Negative for urinary frequency, dysuria, hematuria, nocturia. Skin: Negative for rash. Hematology: Negative for easy bruising, blood clots. Musculo-skelatal: Negative for back pain, muscle pain, weakness. Neurologic: Negative for headaches, dizziness, vertigo, memory problems not related to HE. Psychology: Negative for anxiety, depression. FAMILY HISTORY:  The father  of COPD. The mother  of breast cancer.     There is no family history of liver disease. There is no family history of immune disorders. SOCIAL HISTORY:  The patient is . The patient has 1 child. The patient has never used tobacco products. The patient has never consumed significant amounts of alcohol. The patient used to work as a . The patient retired in 2014. PHYSICAL EXAMINATION:  Visit Vitals  /66 (BP 1 Location: Right arm, BP Patient Position: Sitting)   Pulse 62   Temp 97.9 °F (36.6 °C)   Ht 5' 7\" (1.702 m)   Wt 158 lb (71.7 kg)   SpO2 98%   BMI 24.75 kg/m²     General: No acute distress. Eyes: Sclera anicteric. ENT: No oral lesions. Thyroid normal.  Nodes: No adenopathy. Skin: No spider angiomata. No jaundice. No palmar erythema. Respiratory: Lungs clear to auscultation. Cardiovascular: Regular heart rate. No murmurs. No JVD. Abdomen: Soft non-tender. Liver size normal to percussion/palpation. Spleen not palpable. No obvious ascites. Extremities: No edema. No muscle wasting. No gross arthritic changes. Neurologic: Alert and oriented. Cranial nerves grossly intact. No asterixsis. LABORATORY STUDIES:  Liver [place] [address] Units 12/17/2019 6/17/2019   WBC 4.6 - 13.2 K/uL 4.3 (L) 5.7   ANC 1.8 - 8.0 K/UL 2.5 3.4   HGB 12.0 - 16.0 g/dL 12.1 12.5    - 420 K/uL 249 239   AST 10 - 38 U/L 14 15   ALT 13 - 56 U/L 14 18   Alk Phos 45 - 117 U/L 82 91   Bili, Total 0.2 - 1.0 MG/DL 0.5 0.4   Bili, Direct 0.0 - 0.2 MG/DL 0.2 0.1   Albumin 3.4 - 5.0 g/dL 3.7 3.8   BUN 7.0 - 18 MG/DL 15 16   Creat 0.6 - 1.3 MG/DL 0.87 0.95   Na 136 - 145 mmol/L 138 139   K 3.5 - 5.5 mmol/L 4.3 4.7   Cl 100 - 111 mmol/L 103 104   CO2 21 - 32 mmol/L 28 31   Glucose 74 - 99 mg/dL 135 (H) 98     SEROLOGIES:  Serologies Latest Ref Rng & Units 10/3/2017 6/28/2017   LEONILA, IFA  Positive (A) Negative   LEONILA Pattern  1:160 (H)    ASMCA 0 - 19 Units 27 (H) 31 (H)     LIVER HISTOLOGY:  6/2012. Liver biopsy at [facility]. Severe hepatitis with cholestasis consistent with AIH.    1/2017. Fiboscan at [facility]. Consistent with F0.  5/2018. Shear wave elastography performed at [facility]. EkPa was E Range: 3.26-4.76 kPa, E Mean: 3.89 kPa, E Median: 3.85 kPa, E Std: 0.44 kPa. The results suggested a fibrosis level of F0.  12/2018. Shear wave elastography performed at [facility]. EkPa was Range: 3.34- 4.35 kPa, E Mean: 3.85 kPa, E Median: 3.68 kPa. E Std: 0.39 kPa. The results suggested a fibrosis level of F0. ENDOSCOPIC PROCEDURES:  Not available or performed    RADIOLOGY:  2/2012. Ultrasound of liver. Echogenic consistent with chronic liver disease. No liver mass lesions. No dilated bile ducts. No ascites. 5/2016. Ultrasound of liver. Echogenic consistent with chronic liver disease. No liver mass lesions. No dilated bile ducts. No ascites. 5/2018. Ultrasound of liver. Echogenic consistent with chronic liver disease. No liver mass lesions. No dilated bile ducts. No ascites. 12/2018. Ultrasound of liver. Echogenic consistent with chronic liver disease. No liver mass lesions. No dilated bile ducts. No ascites. OTHER TESTING:  Not available or performed    ASSESSMENT AND PLAN:  Autoimmune Hepatitis by liver biopsy in 2012. Liver transaminases are normal.  Alkaline phosphate is normal.  Liver function is normal.  The platelet count is normal.      Based upon laboratory studies and imaging the patient does not appear to have significant liver injury. The patient is receiving treatment with Cellcept. The patient is responding to and is tolerating the treatment without significant side effects. Have performed laboratory testing to monitor liver function and degree of liver injury. This included BMP, hepatic panel, CBC with platelet count. There is no reason to perform liver biopsy at this time. The need to perform an assessment of liver fibrosis was discussed with the patient.   The Fibroscan can assess liver fibrosis and determine if a patient has advanced fibrosis or cirrhosis without the need for liver biopsy. The Fibroscan can be repeated annually or as often as clinically indicated to assess for fibrosis progression and/or regression. The patient was directed to continue all current medications at the current dosages. There are no contraindications for the patient to take any medications that are necessary for treatment of other medical issues. The patient was counseled regarding alcohol consumption. The need for vaccination against viral hepatitis A and B will be assessed with serologic and instituted as appropriate. All of the above issues were discussed with the patient. All questions were answered. The patient expressed a clear understanding of the above. [address] [place] [address] in 6 months to monitor. The patient will have an ultrasound done same day as next visit.        Eddie Melo, AGPCNP-BC  [address] [place] [address]  [phone]

## 2020-02-05 ENCOUNTER — HOSPITAL ENCOUNTER (OUTPATIENT)
Dept: ULTRASOUND IMAGING | Age: 75
Discharge: HOME | End: 2020-02-05
Payer: MEDICARE

## 2020-02-05 ENCOUNTER — HOSPITAL ENCOUNTER (OUTPATIENT)
Dept: MAMMOGRAPHY | Age: 75
Discharge: HOME | End: 2020-02-05
Payer: MEDICARE

## 2020-02-05 DIAGNOSIS — R92.8 ABNORMAL MAMMOGRAM: ICD-10-CM

## 2020-02-05 DIAGNOSIS — N64.89 BREAST ASYMMETRY: ICD-10-CM

## 2020-02-05 PROCEDURE — 77061 BREAST TOMOSYNTHESIS UNI: CPT

## 2020-02-05 PROCEDURE — 76642 ULTRASOUND BREAST LIMITED: CPT

## 2020-03-02 ENCOUNTER — APPOINTMENT (OUTPATIENT)
Dept: GENERAL RADIOLOGY | Age: 75
End: 2020-03-02
Attending: STUDENT IN AN ORGANIZED HEALTH CARE EDUCATION/TRAINING PROGRAM
Payer: MEDICARE

## 2020-03-02 ENCOUNTER — HOSPITAL ENCOUNTER (EMERGENCY)
Age: 75
Discharge: ANOTHER HOSPITAL | End: 2020-03-02
Attending: EMERGENCY MEDICINE | Admitting: EMERGENCY MEDICINE
Payer: MEDICARE

## 2020-03-02 ENCOUNTER — APPOINTMENT (OUTPATIENT)
Dept: CT IMAGING | Age: 75
End: 2020-03-02
Attending: STUDENT IN AN ORGANIZED HEALTH CARE EDUCATION/TRAINING PROGRAM
Payer: MEDICARE

## 2020-03-02 VITALS
SYSTOLIC BLOOD PRESSURE: 146 MMHG | HEART RATE: 60 BPM | TEMPERATURE: 97.2 F | OXYGEN SATURATION: 100 % | DIASTOLIC BLOOD PRESSURE: 65 MMHG | WEIGHT: 156 LBS | BODY MASS INDEX: 24.48 KG/M2 | HEIGHT: 67 IN | RESPIRATION RATE: 16 BRPM

## 2020-03-02 DIAGNOSIS — S82.142A CLOSED FRACTURE OF LEFT TIBIAL PLATEAU, INITIAL ENCOUNTER: Primary | ICD-10-CM

## 2020-03-02 DIAGNOSIS — S82.131A CLOSED FRACTURE OF MEDIAL PORTION OF RIGHT TIBIAL PLATEAU, INITIAL ENCOUNTER: ICD-10-CM

## 2020-03-02 PROCEDURE — 96374 THER/PROPH/DIAG INJ IV PUSH: CPT

## 2020-03-02 PROCEDURE — 99285 EMERGENCY DEPT VISIT HI MDM: CPT

## 2020-03-02 PROCEDURE — 74011250636 HC RX REV CODE- 250/636: Performed by: STUDENT IN AN ORGANIZED HEALTH CARE EDUCATION/TRAINING PROGRAM

## 2020-03-02 PROCEDURE — 73562 X-RAY EXAM OF KNEE 3: CPT

## 2020-03-02 PROCEDURE — 74011250636 HC RX REV CODE- 250/636: Performed by: EMERGENCY MEDICINE

## 2020-03-02 PROCEDURE — 73700 CT LOWER EXTREMITY W/O DYE: CPT

## 2020-03-02 PROCEDURE — 73030 X-RAY EXAM OF SHOULDER: CPT

## 2020-03-02 PROCEDURE — 73502 X-RAY EXAM HIP UNI 2-3 VIEWS: CPT

## 2020-03-02 PROCEDURE — 74011250637 HC RX REV CODE- 250/637: Performed by: STUDENT IN AN ORGANIZED HEALTH CARE EDUCATION/TRAINING PROGRAM

## 2020-03-02 PROCEDURE — 73590 X-RAY EXAM OF LOWER LEG: CPT

## 2020-03-02 PROCEDURE — 96375 TX/PRO/DX INJ NEW DRUG ADDON: CPT

## 2020-03-02 PROCEDURE — 71045 X-RAY EXAM CHEST 1 VIEW: CPT

## 2020-03-02 RX ORDER — HYDROMORPHONE HYDROCHLORIDE 1 MG/ML
0.5 INJECTION, SOLUTION INTRAMUSCULAR; INTRAVENOUS; SUBCUTANEOUS ONCE
Status: COMPLETED | OUTPATIENT
Start: 2020-03-02 | End: 2020-03-02

## 2020-03-02 RX ORDER — NAPROXEN 250 MG/1
500 TABLET ORAL ONCE
Status: COMPLETED | OUTPATIENT
Start: 2020-03-02 | End: 2020-03-02

## 2020-03-02 RX ORDER — ACETAMINOPHEN 325 MG/1
975 TABLET ORAL
Status: COMPLETED | OUTPATIENT
Start: 2020-03-02 | End: 2020-03-02

## 2020-03-02 RX ADMIN — HYDROMORPHONE HYDROCHLORIDE 0.5 MG: 1 INJECTION, SOLUTION INTRAMUSCULAR; INTRAVENOUS; SUBCUTANEOUS at 17:28

## 2020-03-02 RX ADMIN — ACETAMINOPHEN 975 MG: 325 TABLET ORAL at 16:27

## 2020-03-02 RX ADMIN — NAPROXEN 500 MG: 250 TABLET ORAL at 16:27

## 2020-03-02 RX ADMIN — HYDROMORPHONE HYDROCHLORIDE 0.5 MG: 1 INJECTION, SOLUTION INTRAMUSCULAR; INTRAVENOUS; SUBCUTANEOUS at 22:02
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
## 2020-03-03 NOTE — ED NOTES
Report received from [facility] on patient. Patient currently without IV access. Will attempt IV upon arrival from CT.
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
## 2020-03-03 NOTE — ED NOTES
Vitals:  Patient Vitals for the past 12 hrs:   Temp Pulse Resp BP SpO2   03/02/20 2118 97.2 °F (36.2 °C) 60 16 146/65 100 %   03/02/20 1850 97.9 °F (36.6 °C) 78 16 168/78 100 %   03/02/20 1532 97.5 °F (36.4 °C) 79 18 (!) 180/99 100 %       Medications ordered:   Medications   acetaminophen (TYLENOL) tablet 975 mg (975 mg Oral Given 3/2/20 1627)   naproxen (NAPROSYN) tablet 500 mg (500 mg Oral Given 3/2/20 1627)   HYDROmorphone (DILAUDID) injection 0.5 mg (0.5 mg IntraVENous Given 3/2/20 1728)   HYDROmorphone (DILAUDID) syringe 0.5 mg (0.5 mg IntraVENous Given 3/2/20 2202)         Lab findings:  No results found for this or any previous visit (from the past 12 hour(s)). X-Ray, CT or other radiology findings or impressions:  CT KNEE LT WO CONT   Final Result   IMPRESSION[de-identified]  Tibial plateau fracture both medially and laterally with   depression and hemarthrosis. Osteoarthritis at the patellofemoral joint. CT KNEE RT WO CONT   Final Result   IMPRESSION[de-identified]  Depressed lateral tibial plateau fracture with hemarthrosis. Proximal fibular fracture. Overlying soft tissue swelling. XR KNEE RT 3 V    (Results Pending)   XR KNEE LT 3 V    (Results Pending)   XR TIB/FIB LT    (Results Pending)   XR TIB/FIB RT    (Results Pending)   XR CHEST PORT    (Results Pending)   XR SHOULDER LT AP/LAT MIN 2 V    (Results Pending)   XR HIP LT W OR WO PELV 2-3 VWS    (Results Pending)       Progress notes, Consult notes or additional Procedure notes:   Time: 19:13. Received this patient as a signout from [facility]. He states that we are currently waiting for our orthopedic surgery physician to come and evaluate the patient and then make recommendations for transfer to [facility] for bilateral tibial plateau fractures. Reevaluation of patient:   Time: 20:28. Spoke with humza Almendarez. He states that he reviewed the CTs of bilateral knees and it does show bilateral tibial plateau fractures.   He recommends transfer to [facility].  He does not feel the need to come in and evaluate patient at this time given that the diagnosis is on CT. I will call transfer center. Time: 20:39. Spoke with the transfer center, they recommended that the orthopedic surgeon at [facility] wanted to speak with our orthopedic surgeon personally about the case. Time: 20:47. Spoke with Rosy Jiménez, he gave his contact info and I will give that to the transfer center so he can speak with Humberto Sy. Time: 21:12. Rosy Jiménez called, he soke with ortho at [facility].  He recommends bilateral knee immobilizers at this time. No further recommendations. Patient was accepted and will be transferred. I reevaluated the patient, the patient has soft compartments of bilateral lower extremities. No sensory deficit. DP and PT +2. Knee immobilizers are in place. Time: 21:18. Spoke with transfer center. Then spoke with Zion Albright, ED physician at [facility]. He will accept the patient as a transfer. Disposition:  Diagnosis:   1. Closed fracture of left tibial plateau, initial encounter    2. Closed fracture of medial portion of right tibial plateau, initial encounter        Disposition: Transfer    Follow-up Information    None           Patient's Medications   Start Taking    No medications on file   Continue Taking    CALCIUM-CHOLECALCIFEROL, D3, (CALCIUM 600 + D) 600-125 MG-UNIT TAB    Take  by mouth. DICLOFENAC (VOLTAREN) 1 % GEL    Apply 4 g to affected area two (2) times a day. Apply to affected area as directed. HYDROCHLOROTHIAZIDE (HYDRODIURIL) 12.5 MG TABLET    Take 12.5 mg by mouth daily.     MYCOPHENOLATE MOFETIL (CELLCEPT) 250 MG CAPSULE    take 1 capsule by mouth twice a day   These Medications have changed    No medications on file   Stop Taking    No medications on file

## 2020-06-18 ENCOUNTER — OFFICE VISIT (OUTPATIENT)
Dept: HEMATOLOGY | Age: 75
End: 2020-06-18

## 2020-06-18 ENCOUNTER — HOSPITAL ENCOUNTER (OUTPATIENT)
Dept: ULTRASOUND IMAGING | Age: 75
Discharge: HOME | End: 2020-06-18
Attending: NURSE PRACTITIONER
Payer: MEDICARE

## 2020-06-18 VITALS
DIASTOLIC BLOOD PRESSURE: 73 MMHG | BODY MASS INDEX: 25.52 KG/M2 | HEIGHT: 67 IN | SYSTOLIC BLOOD PRESSURE: 153 MMHG | HEART RATE: 67 BPM | WEIGHT: 162.6 LBS | OXYGEN SATURATION: 98 % | TEMPERATURE: 96.9 F | RESPIRATION RATE: 16 BRPM

## 2020-06-18 DIAGNOSIS — K75.4 AUTOIMMUNE HEPATITIS (HCC): Primary | ICD-10-CM

## 2020-06-18 DIAGNOSIS — K75.4 AUTOIMMUNE HEPATITIS (HCC): ICD-10-CM

## 2020-06-18 PROCEDURE — 76705 ECHO EXAM OF ABDOMEN: CPT

## 2020-06-18 NOTE — PROGRESS NOTES
[facility], MD, Marta Chamber, Leon Shone, MD Soyla Forster, PA-C Wynonia Generous, [facility]     Sandi Costa [facility]   JOSE ANTONIO Quintana [facility]       Hallie Ascencio [facility] 136    at [address], [facility] Yanet Padron  22.    [phone]    FAX: [address] [place] [address], [address]    [phone]    FAX: [phone]     Patient Care Team:  Vero Starkey MD as PCP - General (Internal Medicine)      Problem List  Date Reviewed: 10/22/2019          Codes Class Noted    Autoimmune hepatitis [facility]) ICD-10-CM: K75.4  ICD-9-CM: 571.42  6/28/2017        Hypertension ICD-10-CM: I10  ICD-9-CM: 401.9  6/28/2017              Jovanni Sarah returns to the [address] for management of autoimmune hepatitis. The active problem list, all pertinent past medical history, medications, liver histology, radiologic findings, and laboratory findings related to the liver disorder were reviewed with the patient. The patient is a 69 y.o.  female who was first noted to have abnormalities in liver transaminases in 2012 and then developed more profound elevation in liver enzymes and jaundice while she was visiting family in [facility]. She was hospitalized and transferred to [facility]. A liver biopsy was performed. The findings were consistent with severe AIH and she was treated with high dose prednisone and cellcept. Prednisone was eventually tapered off. She remains on low dose cellcept. The most recent imaging of the liver was Ultrasound performed in 12/2019. Results suggest chronic liver disease.       Assessment of liver fibrosis was performed with shear wave elastography at THE [facility] in 2019. The result was 1.15m/s which correlates with no fibrosis. The most recent laboratory studies indicate that the liver transaminases are normal, ALP is normal, tests of hepatic synthetic and metabolic function are normal, the platelet count is normal.     The patient has no symptoms which could be attributed to the liver disorder. The patient has not experienced fatigue, pain in the right side over the liver. The patient completes all daily activities without any functional limitations. ALLERGIES  No Known Allergies    MEDICATIONS  Current Outpatient Medications   Medication Sig    mycophenolate mofetil (CELLCEPT) 250 mg capsule take 1 capsule by mouth twice a day    calcium-cholecalciferol, d3, (CALCIUM 600 + D) 600-125 mg-unit tab Take  by mouth.  diclofenac (VOLTAREN) 1 % gel Apply 4 g to affected area two (2) times a day. Apply to affected area as directed.  hydroCHLOROthiazide (HYDRODIURIL) 12.5 mg tablet Take 12.5 mg by mouth daily. No current facility-administered medications for this visit. SYSTEM REVIEW NOT RELATED TO LIVER DISEASE OR REVIEWED ABOVE:  Constitution systems: Negative for fever, chills, weight gain, weight loss. Eyes: Negative for visual changes. ENT: Negative for sore throat, painful swallowing. Respiratory: Negative for cough, hemoptysis, SOB. Cardiology: Negative for chest pain, palpitations. GI:  Negative for constipation or diarrhea. : Negative for urinary frequency, dysuria, hematuria, nocturia. Skin: Negative for rash. Hematology: Negative for easy bruising, blood clots. Musculo-skelatal: Negative for back pain, muscle pain, weakness. Neurologic: Negative for headaches, dizziness, vertigo, memory problems not related to HE. Psychology: Negative for anxiety, depression. FAMILY HISTORY:  The father  of COPD. The mother  of breast cancer. There is no family history of liver disease. There is no family history of immune disorders. SOCIAL HISTORY:  The patient is . The patient has 1 child. The patient has never used tobacco products. The patient has never consumed significant amounts of alcohol. The patient used to work as a . The patient retired in 2014. PHYSICAL EXAMINATION:  Visit Vitals  /73 (BP 1 Location: Left arm, BP Patient Position: Sitting)   Pulse 67   Temp 96.9 °F (36.1 °C) (Tympanic)   Resp 16   Ht 5' 7\" (1.702 m)   Wt 162 lb 9.6 oz (73.8 kg)   SpO2 98%   BMI 25.47 kg/m²     General: No acute distress. Eyes: Sclera anicteric. ENT: No oral lesions. Thyroid normal.  Nodes: No adenopathy. Skin: No spider angiomata. No jaundice. No palmar erythema. Respiratory: Lungs clear to auscultation. Cardiovascular: Regular heart rate. No murmurs. No JVD. Abdomen: Soft non-tender. Liver size normal to percussion/palpation. Spleen not palpable. No obvious ascites. Extremities: No edema. No muscle wasting. No gross arthritic changes. Neurologic: Alert and oriented. Cranial nerves grossly intact. No asterixsis. LABORATORY STUDIES:  Liver [place] of [address] Units 12/17/2019 6/17/2019   WBC 4.6 - 13.2 K/uL 4.3 (L) 5.7   ANC 1.8 - 8.0 K/UL 2.5 3.4   HGB 12.0 - 16.0 g/dL 12.1 12.5    - 420 K/uL 249 239   AST 10 - 38 U/L 14 15   ALT 13 - 56 U/L 14 18   Alk Phos 45 - 117 U/L 82 91   Bili, Total 0.2 - 1.0 MG/DL 0.5 0.4   Bili, Direct 0.0 - 0.2 MG/DL 0.2 0.1   Albumin 3.4 - 5.0 g/dL 3.7 3.8   BUN 7.0 - 18 MG/DL 15 16   Creat 0.6 - 1.3 MG/DL 0.87 0.95   Na 136 - 145 mmol/L 138 139   K 3.5 - 5.5 mmol/L 4.3 4.7   Cl 100 - 111 mmol/L 103 104   CO2 21 - 32 mmol/L 28 31   Glucose 74 - 99 mg/dL 135 (H) 98     SEROLOGIES:  Serologies Latest Ref Rng & Units 10/3/2017 6/28/2017   LEONILA, IFA  Positive (A) Negative   LEONILA Pattern  1:160 (H)    ASMCA 0 - 19 Units 27 (H) 31 (H)     LIVER HISTOLOGY:  6/2012.   Liver biopsy at [facility]. Severe hepatitis with cholestasis consistent with AIH.    1/2017. Fiboscan at [facility]. Consistent with F0.  5/2018. Shear wave elastography performed at THE [facility]. EkPa was E Range: 3.26-4.76 kPa, E Mean: 3.89 kPa, E Median: 3.85 kPa, E Std: 0.44 kPa. The results suggested a fibrosis level of F0.  12/2018. Shear wave elastography performed at THE [facility]. EkPa was Range: 3.34- 4.35 kPa, E Mean: 3.85 kPa, E Median: 3.68 kPa. E Std: 0.39 kPa. The results suggested a fibrosis level of F0. ENDOSCOPIC PROCEDURES:  Not available or performed    RADIOLOGY:  2/2012. Ultrasound of liver. Echogenic consistent with chronic liver disease. No liver mass lesions. No dilated bile ducts. No ascites. 5/2016. Ultrasound of liver. Echogenic consistent with chronic liver disease. No liver mass lesions. No dilated bile ducts. No ascites. 5/2018. Ultrasound of liver. Echogenic consistent with chronic liver disease. No liver mass lesions. No dilated bile ducts. No ascites. 12/2018. Ultrasound of liver. Echogenic consistent with chronic liver disease. No liver mass lesions. No dilated bile ducts. No ascites. OTHER TESTING:  Not available or performed    ASSESSMENT AND PLAN:  Autoimmune Hepatitis by liver biopsy in 2012. Liver transaminases are normal.  Alkaline phosphate is normal.  Liver function is normal.  The platelet count is normal.      Based upon laboratory studies and imaging the patient does not appear to have significant liver injury. The patient is receiving treatment with Cellcept. The patient is responding to and is tolerating the treatment without significant side effects. Have performed laboratory testing to monitor liver function and degree of liver injury. This included BMP, hepatic panel, CBC with platelet count. There is no reason to perform liver biopsy at this time. The need to perform an assessment of liver fibrosis was discussed with the patient.   The Fibroscan can assess liver fibrosis and determine if a patient has advanced fibrosis or cirrhosis without the need for liver biopsy. The Fibroscan can be repeated annually or as often as clinically indicated to assess for fibrosis progression and/or regression. The patient was directed to continue all current medications at the current dosages. There are no contraindications for the patient to take any medications that are necessary for treatment of other medical issues. The patient was counseled regarding alcohol consumption. The need for vaccination against viral hepatitis A and B will be assessed with serologic and instituted as appropriate. All of the above issues were discussed with the patient. All questions were answered. The patient expressed a clear understanding of the above. [address] [place] [address] 87 in 6 months to monitor. The patient will have an ultrasound done same day as next visit.        Gregorio Coronel AGPCNP-BC  [address] Liver [place] [address]  [address] [place] [address], [address] [place] [address] - [address]  [phone]

## 2020-07-06 RX ORDER — MYCOPHENOLATE MOFETIL 250 MG/1
CAPSULE ORAL
Qty: 180 CAP | Refills: 3 | Status: SHIPPED | OUTPATIENT
Start: 2020-07-06 | End: 2021-06-30

## 2020-08-03 ENCOUNTER — HOSPITAL ENCOUNTER (OUTPATIENT)
Dept: ULTRASOUND IMAGING | Age: 75
Discharge: HOME | End: 2020-08-03
Payer: MEDICARE

## 2020-08-03 ENCOUNTER — HOSPITAL ENCOUNTER (OUTPATIENT)
Dept: MAMMOGRAPHY | Age: 75
Discharge: HOME | End: 2020-08-03
Payer: MEDICARE

## 2020-08-03 DIAGNOSIS — R92.8 ABNORMAL MAMMOGRAM: ICD-10-CM

## 2020-08-03 PROCEDURE — 76642 ULTRASOUND BREAST LIMITED: CPT

## 2020-12-14 ENCOUNTER — TRANSCRIBE ORDER (OUTPATIENT)
Dept: SCHEDULING | Age: 75
End: 2020-12-14

## 2020-12-14 DIAGNOSIS — Z12.31 SCREENING MAMMOGRAM FOR HIGH-RISK PATIENT: Primary | ICD-10-CM

## 2020-12-15 ENCOUNTER — HOSPITAL ENCOUNTER (OUTPATIENT)
Dept: MAMMOGRAPHY | Age: 75
Discharge: HOME | End: 2020-12-15

## 2020-12-15 DIAGNOSIS — Z12.31 SCREENING MAMMOGRAM FOR HIGH-RISK PATIENT: ICD-10-CM

## 2020-12-15 DIAGNOSIS — R92.8 ABNORMAL MAMMOGRAM: ICD-10-CM

## 2020-12-17 ENCOUNTER — OFFICE VISIT (OUTPATIENT)
Dept: HEMATOLOGY | Age: 75
End: 2020-12-17
Payer: MEDICARE

## 2020-12-17 ENCOUNTER — HOSPITAL ENCOUNTER (OUTPATIENT)
Dept: LAB | Age: 75
Discharge: HOME | End: 2020-12-17
Payer: MEDICARE

## 2020-12-17 VITALS
BODY MASS INDEX: 26.06 KG/M2 | HEART RATE: 83 BPM | SYSTOLIC BLOOD PRESSURE: 137 MMHG | TEMPERATURE: 95 F | WEIGHT: 166 LBS | OXYGEN SATURATION: 98 % | HEIGHT: 67 IN | RESPIRATION RATE: 17 BRPM | DIASTOLIC BLOOD PRESSURE: 89 MMHG

## 2020-12-17 DIAGNOSIS — K75.4 AUTOIMMUNE HEPATITIS (HCC): Primary | ICD-10-CM

## 2020-12-17 DIAGNOSIS — K75.4 AUTOIMMUNE HEPATITIS (HCC): ICD-10-CM

## 2020-12-17 LAB
ALBUMIN SERPL-MCNC: 3.7 G/DL (ref 3.4–5)
ALBUMIN/GLOB SERPL: 0.9 {RATIO} (ref 0.8–1.7)
ALP SERPL-CCNC: 96 U/L (ref 45–117)
ALT SERPL-CCNC: 16 U/L (ref 13–56)
ANION GAP SERPL CALC-SCNC: 3 MMOL/L (ref 3–18)
AST SERPL-CCNC: 13 U/L (ref 10–38)
BASOPHILS # BLD: 0 K/UL (ref 0–0.1)
BASOPHILS NFR BLD: 0 % (ref 0–2)
BILIRUB DIRECT SERPL-MCNC: 0.1 MG/DL (ref 0–0.2)
BILIRUB SERPL-MCNC: 0.5 MG/DL (ref 0.2–1)
BUN SERPL-MCNC: 16 MG/DL (ref 7–18)
BUN/CREAT SERPL: 21 (ref 12–20)
CALCIUM SERPL-MCNC: 10.2 MG/DL (ref 8.5–10.1)
CHLORIDE SERPL-SCNC: 106 MMOL/L (ref 100–111)
CO2 SERPL-SCNC: 30 MMOL/L (ref 21–32)
CREAT SERPL-MCNC: 0.75 MG/DL (ref 0.6–1.3)
DIFFERENTIAL METHOD BLD: ABNORMAL
EOSINOPHIL # BLD: 0 K/UL (ref 0–0.4)
EOSINOPHIL NFR BLD: 1 % (ref 0–5)
ERYTHROCYTE [DISTWIDTH] IN BLOOD BY AUTOMATED COUNT: 14.9 % (ref 11.6–14.5)
GLOBULIN SER CALC-MCNC: 4 G/DL (ref 2–4)
GLUCOSE SERPL-MCNC: 83 MG/DL (ref 74–99)
HCT VFR BLD AUTO: 38.4 % (ref 35–45)
HGB BLD-MCNC: 12.6 G/DL (ref 12–16)
LYMPHOCYTES # BLD: 1.3 K/UL (ref 0.9–3.6)
LYMPHOCYTES NFR BLD: 28 % (ref 21–52)
MCH RBC QN AUTO: 27.5 PG (ref 24–34)
MCHC RBC AUTO-ENTMCNC: 32.8 G/DL (ref 31–37)
MCV RBC AUTO: 83.7 FL (ref 74–97)
MONOCYTES # BLD: 0.5 K/UL (ref 0.05–1.2)
MONOCYTES NFR BLD: 12 % (ref 3–10)
NEUTS SEG # BLD: 2.8 K/UL (ref 1.8–8)
NEUTS SEG NFR BLD: 59 % (ref 40–73)
PLATELET # BLD AUTO: 258 K/UL (ref 135–420)
PMV BLD AUTO: 10.5 FL (ref 9.2–11.8)
POTASSIUM SERPL-SCNC: 4.9 MMOL/L (ref 3.5–5.5)
PROT SERPL-MCNC: 7.7 G/DL (ref 6.4–8.2)
RBC # BLD AUTO: 4.59 M/UL (ref 4.2–5.3)
SODIUM SERPL-SCNC: 139 MMOL/L (ref 136–145)
WBC # BLD AUTO: 4.7 K/UL (ref 4.6–13.2)

## 2020-12-17 PROCEDURE — 80076 HEPATIC FUNCTION PANEL: CPT

## 2020-12-17 PROCEDURE — G8419 CALC BMI OUT NRM PARAM NOF/U: HCPCS | Performed by: NURSE PRACTITIONER

## 2020-12-17 PROCEDURE — 1101F PT FALLS ASSESS-DOCD LE1/YR: CPT | Performed by: NURSE PRACTITIONER

## 2020-12-17 PROCEDURE — G8432 DEP SCR NOT DOC, RNG: HCPCS | Performed by: NURSE PRACTITIONER

## 2020-12-17 PROCEDURE — 80048 BASIC METABOLIC PNL TOTAL CA: CPT

## 2020-12-17 PROCEDURE — G9899 SCRN MAM PERF RSLTS DOC: HCPCS | Performed by: NURSE PRACTITIONER

## 2020-12-17 PROCEDURE — G8752 SYS BP LESS 140: HCPCS | Performed by: NURSE PRACTITIONER

## 2020-12-17 PROCEDURE — 1090F PRES/ABSN URINE INCON ASSESS: CPT | Performed by: NURSE PRACTITIONER

## 2020-12-17 PROCEDURE — 36415 COLL VENOUS BLD VENIPUNCTURE: CPT

## 2020-12-17 PROCEDURE — 85025 COMPLETE CBC W/AUTO DIFF WBC: CPT

## 2020-12-17 PROCEDURE — 99213 OFFICE O/P EST LOW 20 MIN: CPT | Performed by: NURSE PRACTITIONER

## 2020-12-17 PROCEDURE — G8536 NO DOC ELDER MAL SCRN: HCPCS | Performed by: NURSE PRACTITIONER

## 2020-12-17 PROCEDURE — G8400 PT W/DXA NO RESULTS DOC: HCPCS | Performed by: NURSE PRACTITIONER

## 2020-12-17 PROCEDURE — G8754 DIAS BP LESS 90: HCPCS | Performed by: NURSE PRACTITIONER

## 2020-12-17 PROCEDURE — G8427 DOCREV CUR MEDS BY ELIG CLIN: HCPCS | Performed by: NURSE PRACTITIONER

## 2020-12-17 PROCEDURE — 3017F COLORECTAL CA SCREEN DOC REV: CPT | Performed by: NURSE PRACTITIONER

## 2020-12-17 NOTE — PROGRESS NOTES
Simon Medina MD, [facility], Cite Cordell Mckeon, Attila Pollard MD, MPH      Joanne Lopez, MITUL Barnard, [facility]     Sandi Costa, [facility]   Na Quick, [facility]    Hannah Pete, [facility]       Halliegregory Ascencio [address]    at [address], [facility] Yanet Padron  22.    [phone]    FAX: [address] [place] [address], [address]    [phone]    FAX: [phone]       Patient Care Team:  Elle Samson MD as PCP - General (Internal Medicine)      Problem List  Date Reviewed: 10/22/2019          Codes Class Noted    Autoimmune hepatitis [facility]) ICD-10-CM: K75.4  ICD-9-CM: 571.42  6/28/2017        Hypertension ICD-10-CM: I10  ICD-9-CM: 401.9  6/28/2017              Brian Cunningham [facility] [place] for management of autoimmune hepatitis. The active problem list, all pertinent past medical history, medications, liver histology, radiologic findings, and laboratory findings related to the liver disorder were reviewed with the patient. The patient is a 77 y.o.  female who was first noted to have abnormalities in liver transaminases in 2012 and then developed more profound elevation in liver enzymes and jaundice while she was visiting family in [facility]. She was hospitalized and transferred to [facility]. A liver biopsy was performed. The findings were consistent with severe AIH and she was treated with high dose prednisone and cellcept. Prednisone was eventually tapered off. She remains on low dose cellcept. The most recent imaging of the liver was Ultrasound performed in 12/2019. Results suggest chronic liver disease.       Assessment of liver fibrosis was performed with shear wave elastography at THE [facility] in 2019. The result was 1.15m/s which correlates with no fibrosis. The most recent laboratory studies indicate that the liver transaminases are normal, ALP is normal, tests of hepatic synthetic and metabolic function are normal, the platelet count is normal.     The patient has no symptoms which could be attributed to the liver disorder. The patient has not experienced fatigue, pain in the right side over the liver. The patient completes all daily activities without any functional limitations. ALLERGIES  No Known Allergies    MEDICATIONS  Current Outpatient Medications   Medication Sig    mycophenolate mofetil (CELLCEPT) 250 mg capsule take 1 capsule by mouth twice a day    calcium-cholecalciferol, d3, (CALCIUM 600 + D) 600-125 mg-unit tab Take  by mouth.  diclofenac (VOLTAREN) 1 % gel Apply 4 g to affected area two (2) times a day. Apply to affected area as directed.  hydroCHLOROthiazide (HYDRODIURIL) 12.5 mg tablet Take 12.5 mg by mouth daily. No current facility-administered medications for this visit. SYSTEM REVIEW NOT RELATED TO LIVER DISEASE OR REVIEWED ABOVE:  Constitution systems: Negative for fever, chills, weight gain, weight loss. Eyes: Negative for visual changes. ENT: Negative for sore throat, painful swallowing. Respiratory: Negative for cough, hemoptysis, SOB. Cardiology: Negative for chest pain, palpitations. GI:  Negative for constipation or diarrhea. : Negative for urinary frequency, dysuria, hematuria, nocturia. Skin: Negative for rash. Hematology: Negative for easy bruising, blood clots. Musculo-skelatal: Negative for back pain, muscle pain, weakness. Neurologic: Negative for headaches, dizziness, vertigo, memory problems not related to HE. Psychology: Negative for anxiety, depression. FAMILY HISTORY:  The father  of COPD. The mother  of breast cancer. There is no family history of liver disease. There is no family history of immune disorders. SOCIAL HISTORY:  The patient is . The patient has 1 child. The patient has never used tobacco products. The patient has never consumed significant amounts of alcohol. The patient used to work as a . The patient retired in 2014. PHYSICAL EXAMINATION:  Visit Vitals  /89 (BP 1 Location: Right arm, BP Patient Position: Sitting)   Pulse 83   Temp (!) 95 °F (35 °C)   Resp 17   Ht 5' 7\" (1.702 m)   Wt 166 lb (75.3 kg)   SpO2 98%   BMI 26.00 kg/m²     General: No acute distress. Eyes: Sclera anicteric. ENT: No oral lesions. Thyroid normal.  Nodes: No adenopathy. Skin: No spider angiomata. No jaundice. No palmar erythema. Respiratory: Lungs clear to auscultation. Cardiovascular: Regular heart rate. No murmurs. No JVD. Abdomen: Soft non-tender. Liver size normal to percussion/palpation. Spleen not palpable. No obvious ascites. Extremities: No edema. No muscle wasting. No gross arthritic changes. Neurologic: Alert and oriented. Cranial nerves grossly intact. No asterixsis. LABORATORY STUDIES:  Liver [place] of [address] Units 12/17/2020   WBC 4.6 - 13.2 K/uL 4.7   ANC 1.8 - 8.0 K/UL 2.8   HGB 12.0 - 16.0 g/dL 12.6    - 420 K/uL 258   AST 10 - 38 U/L 13   ALT 13 - 56 U/L 16   Alk Phos 45 - 117 U/L 96   Bili, Total 0.2 - 1.0 MG/DL 0.5   Bili, Direct 0.0 - 0.2 MG/DL 0.1   Albumin 3.4 - 5.0 g/dL 3.7   BUN 7.0 - 18 MG/DL 16   Creat 0.6 - 1.3 MG/DL 0.75   Na 136 - 145 mmol/L 139   K 3.5 - 5.5 mmol/L 4.9   Cl 100 - 111 mmol/L 106   CO2 21 - 32 mmol/L 30   Glucose 74 - 99 mg/dL 83     SEROLOGIES:  Serologies Latest Ref Rng & Units 10/3/2017 6/28/2017   LEONILA, IFA  Positive (A) Negative   LEONILA Pattern  1:160 (H)    ASMCA 0 - 19 Units 27 (H) 31 (H)     LIVER HISTOLOGY:  6/2012. Liver biopsy at [facility]. Severe hepatitis with cholestasis consistent with AIH.    1/2017. Fiboscan at [facility]. Consistent with F0.  5/2018. Shear wave elastography performed at THE [facility]. EkPa was E Range: 3.26-4.76 kPa, E Mean: 3.89 kPa, E Median: 3.85 kPa, E Std: 0.44 kPa. The results suggested a fibrosis level of F0.  12/2018. Shear wave elastography performed at THE [facility]. EkPa was Range: 3.34- 4.35 kPa, E Mean: 3.85 kPa, E Median: 3.68 kPa. E Std: 0.39 kPa. The results suggested a fibrosis level of F0. ENDOSCOPIC PROCEDURES:  Not available or performed    RADIOLOGY:  12/2018. Ultrasound of liver. Echogenic consistent with chronic liver disease. No liver mass lesions. No dilated bile ducts. No ascites. 12/2019. Ultrasound of liver. Echogenic consistent with chronic liver disease. No liver mass lesions. No dilated bile ducts. No ascites. 6/2020. Ultrasound of liver. Echogenic consistent with cirrhosis. No liver mass lesions. No dilated bile ducts. No ascites. OTHER TESTING:  Not available or performed    ASSESSMENT AND PLAN:  Autoimmune Hepatitis by liver biopsy in 2012. Liver transaminases are normal. Alkaline phosphate is normal. Liver function is normal. The platelet count is normal.      Based upon laboratory studies and imaging the patient does not appear to have significant liver injury. The patient is receiving treatment with Cellcept. The patient is responding to and is tolerating the treatment without significant side effects. Have performed laboratory testing to monitor liver function and degree of liver injury. This included BMP, hepatic panel, CBC with platelet count. There is no reason to perform liver biopsy at this time. The data regarding the severity of fibrosis vs cirrhosis is conflicting. To be on the safe side will continue to screen for [address] every 6 months. The need to perform an assessment of liver fibrosis was discussed with the patient. The Fibroscan can assess liver fibrosis and determine if a patient has advanced fibrosis or cirrhosis without the need for liver biopsy.       The Fibroscan can be repeated annually or as often as clinically indicated to assess for fibrosis progression and/or regression. The patient was directed to continue all current medications at the current dosages. There are no contraindications for the patient to take any medications that are necessary for treatment of other medical issues. The patient was counseled regarding alcohol consumption. The need for vaccination against viral hepatitis A and B will be assessed with serologic and instituted as appropriate. All of the above issues were discussed with the patient. All questions were answered. The patient expressed a clear understanding of the above. [address] [place] [address] in 6 months to monitor. The patient will have an ultrasound done same day as next visit.        Antonia Organ, AGPCNP-BC  [address] Liver [place] [address]  [address] [place], [address] [phone]

## 2020-12-24 DIAGNOSIS — K75.4 AUTOIMMUNE HEPATITIS (HCC): ICD-10-CM

## 2020-12-30 ENCOUNTER — HOSPITAL ENCOUNTER (OUTPATIENT)
Dept: ULTRASOUND IMAGING | Age: 75
Discharge: HOME | End: 2020-12-30
Attending: NURSE PRACTITIONER
Payer: MEDICARE

## 2020-12-30 PROCEDURE — 76705 ECHO EXAM OF ABDOMEN: CPT

## 2021-01-14 NOTE — PROGRESS NOTES
Please call patient and let them know that their recent ultrasound is stable with no concerning lesions/masses within the liver. Please let her know that since the data regarding the severity of fibrosis vs cirrhosis is conflicting, to be on the safe side will continue to screen for [address] every 6 months as we discussed at our last appointment. She can call me directly if she has any other questions.

## 2021-02-03 ENCOUNTER — HOSPITAL ENCOUNTER (OUTPATIENT)
Dept: MAMMOGRAPHY | Age: 76
Discharge: HOME | End: 2021-02-03
Payer: MEDICARE

## 2021-02-03 ENCOUNTER — HOSPITAL ENCOUNTER (OUTPATIENT)
Dept: ULTRASOUND IMAGING | Age: 76
Discharge: HOME | End: 2021-02-03
Payer: MEDICARE

## 2021-02-03 DIAGNOSIS — R92.8 ABNORMAL MAMMOGRAM: ICD-10-CM

## 2021-02-03 PROCEDURE — 77062 BREAST TOMOSYNTHESIS BI: CPT

## 2021-02-03 PROCEDURE — 76642 ULTRASOUND BREAST LIMITED: CPT

## 2021-06-15 ENCOUNTER — OFFICE VISIT (OUTPATIENT)
Dept: HEMATOLOGY | Age: 76
End: 2021-06-15
Payer: MEDICARE

## 2021-06-15 ENCOUNTER — HOSPITAL ENCOUNTER (OUTPATIENT)
Dept: LAB | Age: 76
Discharge: HOME | End: 2021-06-15
Payer: MEDICARE

## 2021-06-15 VITALS
HEIGHT: 67 IN | OXYGEN SATURATION: 98 % | SYSTOLIC BLOOD PRESSURE: 130 MMHG | RESPIRATION RATE: 17 BRPM | TEMPERATURE: 96 F | DIASTOLIC BLOOD PRESSURE: 72 MMHG | WEIGHT: 165 LBS | HEART RATE: 78 BPM | BODY MASS INDEX: 25.9 KG/M2

## 2021-06-15 DIAGNOSIS — K75.4 AUTOIMMUNE HEPATITIS (HCC): ICD-10-CM

## 2021-06-15 DIAGNOSIS — K75.4 AUTOIMMUNE HEPATITIS (HCC): Primary | ICD-10-CM

## 2021-06-15 LAB
ALBUMIN SERPL-MCNC: 4 G/DL (ref 3.4–5)
ALBUMIN/GLOB SERPL: 1 {RATIO} (ref 0.8–1.7)
ALP SERPL-CCNC: 102 U/L (ref 45–117)
ALT SERPL-CCNC: 20 U/L (ref 13–56)
ANION GAP SERPL CALC-SCNC: 4 MMOL/L (ref 3–18)
AST SERPL-CCNC: 14 U/L (ref 10–38)
BASOPHILS # BLD: 0 K/UL (ref 0–0.1)
BASOPHILS NFR BLD: 1 % (ref 0–2)
BILIRUB DIRECT SERPL-MCNC: 0.1 MG/DL (ref 0–0.2)
BILIRUB SERPL-MCNC: 0.5 MG/DL (ref 0.2–1)
BUN SERPL-MCNC: 16 MG/DL (ref 7–18)
BUN/CREAT SERPL: 21 (ref 12–20)
CALCIUM SERPL-MCNC: 10 MG/DL (ref 8.5–10.1)
CHLORIDE SERPL-SCNC: 104 MMOL/L (ref 100–111)
CO2 SERPL-SCNC: 30 MMOL/L (ref 21–32)
CREAT SERPL-MCNC: 0.77 MG/DL (ref 0.6–1.3)
DIFFERENTIAL METHOD BLD: ABNORMAL
EOSINOPHIL # BLD: 0.2 K/UL (ref 0–0.4)
EOSINOPHIL NFR BLD: 3 % (ref 0–5)
ERYTHROCYTE [DISTWIDTH] IN BLOOD BY AUTOMATED COUNT: 14.5 % (ref 11.6–14.5)
GLOBULIN SER CALC-MCNC: 4.1 G/DL (ref 2–4)
GLUCOSE SERPL-MCNC: 84 MG/DL (ref 74–99)
HCT VFR BLD AUTO: 40.4 % (ref 35–45)
HGB BLD-MCNC: 12.4 G/DL (ref 12–16)
LYMPHOCYTES # BLD: 1.5 K/UL (ref 0.9–3.6)
LYMPHOCYTES NFR BLD: 26 % (ref 21–52)
MCH RBC QN AUTO: 26.8 PG (ref 24–34)
MCHC RBC AUTO-ENTMCNC: 30.7 G/DL (ref 31–37)
MCV RBC AUTO: 87.3 FL (ref 74–97)
MONOCYTES # BLD: 0.7 K/UL (ref 0.05–1.2)
MONOCYTES NFR BLD: 12 % (ref 3–10)
NEUTS SEG # BLD: 3.5 K/UL (ref 1.8–8)
NEUTS SEG NFR BLD: 59 % (ref 40–73)
PLATELET # BLD AUTO: 284 K/UL (ref 135–420)
PMV BLD AUTO: 10.6 FL (ref 9.2–11.8)
POTASSIUM SERPL-SCNC: 4.7 MMOL/L (ref 3.5–5.5)
PROT SERPL-MCNC: 8.1 G/DL (ref 6.4–8.2)
RBC # BLD AUTO: 4.63 M/UL (ref 4.2–5.3)
SODIUM SERPL-SCNC: 138 MMOL/L (ref 136–145)
WBC # BLD AUTO: 5.9 K/UL (ref 4.6–13.2)

## 2021-06-15 PROCEDURE — G8400 PT W/DXA NO RESULTS DOC: HCPCS | Performed by: NURSE PRACTITIONER

## 2021-06-15 PROCEDURE — 91200 LIVER ELASTOGRAPHY: CPT | Performed by: NURSE PRACTITIONER

## 2021-06-15 PROCEDURE — G8432 DEP SCR NOT DOC, RNG: HCPCS | Performed by: NURSE PRACTITIONER

## 2021-06-15 PROCEDURE — G8754 DIAS BP LESS 90: HCPCS | Performed by: NURSE PRACTITIONER

## 2021-06-15 PROCEDURE — G8427 DOCREV CUR MEDS BY ELIG CLIN: HCPCS | Performed by: NURSE PRACTITIONER

## 2021-06-15 PROCEDURE — 3017F COLORECTAL CA SCREEN DOC REV: CPT | Performed by: NURSE PRACTITIONER

## 2021-06-15 PROCEDURE — G8419 CALC BMI OUT NRM PARAM NOF/U: HCPCS | Performed by: NURSE PRACTITIONER

## 2021-06-15 PROCEDURE — 1090F PRES/ABSN URINE INCON ASSESS: CPT | Performed by: NURSE PRACTITIONER

## 2021-06-15 PROCEDURE — 85025 COMPLETE CBC W/AUTO DIFF WBC: CPT

## 2021-06-15 PROCEDURE — 99213 OFFICE O/P EST LOW 20 MIN: CPT | Performed by: NURSE PRACTITIONER

## 2021-06-15 PROCEDURE — 80048 BASIC METABOLIC PNL TOTAL CA: CPT

## 2021-06-15 PROCEDURE — G9899 SCRN MAM PERF RSLTS DOC: HCPCS | Performed by: NURSE PRACTITIONER

## 2021-06-15 PROCEDURE — G8536 NO DOC ELDER MAL SCRN: HCPCS | Performed by: NURSE PRACTITIONER

## 2021-06-15 PROCEDURE — G8752 SYS BP LESS 140: HCPCS | Performed by: NURSE PRACTITIONER

## 2021-06-15 PROCEDURE — 80076 HEPATIC FUNCTION PANEL: CPT

## 2021-06-15 PROCEDURE — 1101F PT FALLS ASSESS-DOCD LE1/YR: CPT | Performed by: NURSE PRACTITIONER

## 2021-06-15 PROCEDURE — 36415 COLL VENOUS BLD VENIPUNCTURE: CPT

## 2021-06-15 NOTE — PROGRESS NOTES
Ashley Dumont MD, Truman Goodpasture, Sarah Rizo MD, MPH      MITUL Mcgee, [facility]     Sandi Costa, [facility]   Ariana Montalvo, [facility]    Tiago Ramirez, [facility]       Hallie Ascencio [address]    at [address], [facility] Yanet Padron  22.    [phone]    FAX: [address] [place] [address], [address]    [phone]    FAX: [phone]       Patient Care Team:  Terry Curtis MD as PCP - General (Internal Medicine)      Problem List  Date Reviewed: 12/17/2020        Codes Class Noted    Autoimmune hepatitis [facility]) ICD-10-CM: K75.4  ICD-9-CM: 571.42  6/28/2017        Hypertension ICD-10-CM: I10  ICD-9-CM: 401.9  6/28/2017              Blessing Dumont returns to the [facility] for management of autoimmune hepatitis. The active problem list, all pertinent past medical history, medications, liver histology, radiologic findings, and laboratory findings related to the liver disorder were reviewed with the patient. The patient is a 77 y.o. female who was first noted to have abnormalities in liver transaminases in 2012 and then developed more profound elevation in liver enzymes and jaundice while she was visiting family in [facility]. She was hospitalized and transferred to [facility]. A liver biopsy was performed. The findings were consistent with severe AIH and she was treated with high dose prednisone and cellcept. Prednisone was eventually tapered off. She remains on low dose cellcept. The most recent imaging of the liver was Ultrasound performed in 12/2020. Results suggest chronic liver disease.       Assessment of liver fibrosis with Fibroscan was performed in the office today. The result was 4.5 kPa which correlates with no fibrosis. The CAP score of 141 does not suggest hepatic steatosis. The most recent laboratory studies indicate that the liver transaminases are normal, ALP is normal, tests of hepatic synthetic and metabolic function are normal, the platelet count is normal.     The patient has no symptoms which could be attributed to the liver disorder. The patient has not experienced fatigue, pain in the right side over the liver. The patient completes all daily activities without any functional limitations. ALLERGIES  No Known Allergies    MEDICATIONS  Current Outpatient Medications   Medication Sig    mycophenolate mofetil (CELLCEPT) 250 mg capsule take 1 capsule by mouth twice a day    calcium-cholecalciferol, d3, (CALCIUM 600 + D) 600-125 mg-unit tab Take  by mouth.  diclofenac (VOLTAREN) 1 % gel Apply 4 g to affected area two (2) times a day. Apply to affected area as directed.  hydroCHLOROthiazide (HYDRODIURIL) 12.5 mg tablet Take 12.5 mg by mouth daily. No current facility-administered medications for this visit. SYSTEM REVIEW NOT RELATED TO LIVER DISEASE OR REVIEWED ABOVE:  Constitution systems: Negative for fever, chills, weight gain, weight loss. Eyes: Negative for visual changes. ENT: Negative for sore throat, painful swallowing. Respiratory: Negative for cough, hemoptysis, SOB. Cardiology: Negative for chest pain, palpitations. GI:  Negative for constipation or diarrhea. : Negative for urinary frequency, dysuria, hematuria, nocturia. Skin: Negative for rash. Hematology: Negative for easy bruising, blood clots. Musculo-skelatal: Negative for back pain, muscle pain, weakness. Neurologic: Negative for headaches, dizziness, vertigo, memory problems not related to HE. Psychology: Negative for anxiety, depression. FAMILY HISTORY:  The father  of COPD. The mother  of breast cancer.     There is no family history of liver disease. There is no family history of immune disorders. SOCIAL HISTORY:  The patient is . The patient has 1 child. The patient has never used tobacco products. The patient has never consumed significant amounts of alcohol. The patient used to work as a . The patient retired in 2014. PHYSICAL EXAMINATION:  Visit Vitals  /72 (BP 1 Location: Left upper arm, BP Patient Position: Sitting, BP Cuff Size: Small adult)   Pulse 78   Temp (!) 96 °F (35.6 °C)   Resp 17   Ht 5' 7\" (1.702 m)   Wt 165 lb (74.8 kg)   SpO2 98%   BMI 25.84 kg/m²     General: No acute distress. Eyes: Sclera anicteric. ENT: No oral lesions. Thyroid normal.  Nodes: No adenopathy. Skin: No spider angiomata. No jaundice. No palmar erythema. Respiratory: Lungs clear to auscultation. Cardiovascular: Regular heart rate. No murmurs. No JVD. Abdomen: Soft non-tender. Liver size normal to percussion/palpation. Spleen not palpable. No obvious ascites. Extremities: No edema. No muscle wasting. No gross arthritic changes. Neurologic: Alert and oriented. Cranial nerves grossly intact. No asterixsis. LABORATORY STUDIES:  Liver [place] of [address] Units 12/17/2020   WBC 4.6 - 13.2 K/uL 4.7   ANC 1.8 - 8.0 K/UL 2.8   HGB 12.0 - 16.0 g/dL 12.6    - 420 K/uL 258   AST 10 - 38 U/L 13   ALT 13 - 56 U/L 16   Alk Phos 45 - 117 U/L 96   Bili, Total 0.2 - 1.0 MG/DL 0.5   Bili, Direct 0.0 - 0.2 MG/DL 0.1   Albumin 3.4 - 5.0 g/dL 3.7   BUN 7.0 - 18 MG/DL 16   Creat 0.6 - 1.3 MG/DL 0.75   Na 136 - 145 mmol/L 139   K 3.5 - 5.5 mmol/L 4.9   Cl 100 - 111 mmol/L 106   CO2 21 - 32 mmol/L 30   Glucose 74 - 99 mg/dL 83     SEROLOGIES:  Serologies Latest Ref Rng & Units 10/3/2017 6/28/2017   LEONILA, IFA  Positive (A) Negative   LEONILA Pattern  1:160 (H)    ASMCA 0 - 19 Units 27 (H) 31 (H)     LIVER HISTOLOGY:  6/2012. Liver biopsy at [facility].   Severe hepatitis with cholestasis consistent with AIH.    1/2017. Fiboscan at [facility]. Consistent with F0.  5/2018. Shear wave elastography performed at [facility]. EkPa was E Range: 3.26-4.76 kPa, E Mean: 3.89 kPa, E Median: 3.85 kPa, E Std: 0.44 kPa. The results suggested a fibrosis level of F0.  12/2018. Shear wave elastography performed at [facility]. EkPa was Range: 3.34- 4.35 kPa, E Mean: 3.85 kPa, E Median: 3.68 kPa. E Std: 0.39 kPa. The results suggested a fibrosis level of F0.  6/2021. FibroScan performed at [address]. EkPa was 4.5. IQR/med 16%. . The results suggested a fibrosis level of F0. The CAP score suggests there is no significant hepatic steatosis. ENDOSCOPIC PROCEDURES:  Not available or performed    RADIOLOGY:  12/2018. Ultrasound of liver. Echogenic consistent with chronic liver disease. No liver mass lesions. No dilated bile ducts. No ascites. 12/2019. Ultrasound of liver. Echogenic consistent with chronic liver disease. No liver mass lesions. No dilated bile ducts. No ascites. 6/2020. Ultrasound of liver. Echogenic consistent with cirrhosis. No liver mass lesions. No dilated bile ducts. No ascites. 12/2020. Ultrasound of liver. Echogenic consistent with chronic liver disease. No liver mass lesions. No dilated bile ducts. No ascites. OTHER TESTING:  Not available or performed    ASSESSMENT AND PLAN:  Autoimmune Hepatitis by liver biopsy in 2012. Liver transaminases are normal. Alkaline phosphate is normal. Liver function is normal. The platelet count is normal.      Based upon laboratory studies and imaging the patient does not appear to have significant liver injury. The patient is receiving treatment with Cellcept. The patient is responding to and is tolerating the treatment without significant side effects. Will perform laboratory testing to monitor liver function and degree of liver injury. This included BMP, hepatic panel, CBC with platelet count.     Will repeat ultrasound in 6 months. The need to perform an assessment of liver fibrosis was discussed with the patient. The Fibroscan can assess liver fibrosis and determine if a patient has advanced fibrosis or cirrhosis without the need for liver biopsy. The Fibroscan can be repeated annually or as often as clinically indicated to assess for fibrosis progression and/or regression. The patient was directed to continue all current medications at the current dosages. There are no contraindications for the patient to take any medications that are necessary for treatment of other medical issues. The patient was counseled regarding alcohol consumption. The need for vaccination against viral hepatitis A and B will be assessed with serologic and instituted as appropriate. FOLLOW-UP:  All of the issues listed above in the Assessment and Plan were discussed with the patient. All questions were answered. The patient expressed a clear understanding of the above. [address] [address] in 6 months for routine monitoring.       Leia Paredes AGPCNP-BC  [address] Liver [place] of [address], [facility] [address]  [facility], [address]  [phone]

## 2021-06-15 NOTE — PROGRESS NOTES
Labs reviewed. All labs were either normal and/or abnormal values were not clinically meaningful to patient's current visit. Patient arrives with mom who is concerned for a head injury. Says that patient is a triplet and she was in the process of getting them all into their stroller when she tripped and the patient fell approx. 3 feet and landed on black top. Says the patient cried right away and is acting appropriately since. She thinks he hit the left side of his forehead. This happened just before 0900.

## 2021-06-29 ENCOUNTER — HOSPITAL ENCOUNTER (OUTPATIENT)
Dept: ULTRASOUND IMAGING | Age: 76
Discharge: HOME | End: 2021-06-29
Attending: NURSE PRACTITIONER
Payer: MEDICARE

## 2021-06-29 DIAGNOSIS — K75.4 AUTOIMMUNE HEPATITIS (HCC): ICD-10-CM

## 2021-06-29 PROCEDURE — 76705 ECHO EXAM OF ABDOMEN: CPT

## 2021-06-30 RX ORDER — MYCOPHENOLATE MOFETIL 250 MG/1
CAPSULE ORAL
Qty: 180 CAPSULE | Refills: 3 | Status: SHIPPED | OUTPATIENT
Start: 2021-06-30 | End: 2022-07-05

## 2021-12-16 ENCOUNTER — HOSPITAL ENCOUNTER (OUTPATIENT)
Dept: LAB | Age: 76
Discharge: HOME | End: 2021-12-16
Payer: MEDICARE

## 2021-12-16 ENCOUNTER — OFFICE VISIT (OUTPATIENT)
Dept: HEMATOLOGY | Age: 76
End: 2021-12-16
Payer: MEDICARE

## 2021-12-16 VITALS
RESPIRATION RATE: 15 BRPM | SYSTOLIC BLOOD PRESSURE: 141 MMHG | WEIGHT: 163 LBS | HEIGHT: 67 IN | BODY MASS INDEX: 25.58 KG/M2 | HEART RATE: 60 BPM | OXYGEN SATURATION: 98 % | DIASTOLIC BLOOD PRESSURE: 70 MMHG | TEMPERATURE: 97.1 F

## 2021-12-16 DIAGNOSIS — K75.4 AUTOIMMUNE HEPATITIS (HCC): Primary | ICD-10-CM

## 2021-12-16 DIAGNOSIS — K75.4 AUTOIMMUNE HEPATITIS (HCC): ICD-10-CM

## 2021-12-16 LAB
ALBUMIN SERPL-MCNC: 3.4 G/DL (ref 3.4–5)
ALBUMIN/GLOB SERPL: 0.8 {RATIO} (ref 0.8–1.7)
ALP SERPL-CCNC: 86 U/L (ref 45–117)
ALT SERPL-CCNC: 22 U/L (ref 13–56)
ANION GAP SERPL CALC-SCNC: 3 MMOL/L (ref 3–18)
AST SERPL-CCNC: 17 U/L (ref 10–38)
BASOPHILS # BLD: 0 K/UL (ref 0–0.1)
BASOPHILS NFR BLD: 0 % (ref 0–2)
BILIRUB DIRECT SERPL-MCNC: 0.2 MG/DL (ref 0–0.2)
BILIRUB SERPL-MCNC: 0.5 MG/DL (ref 0.2–1)
BUN SERPL-MCNC: 14 MG/DL (ref 7–18)
BUN/CREAT SERPL: 17 (ref 12–20)
CALCIUM SERPL-MCNC: 9.6 MG/DL (ref 8.5–10.1)
CHLORIDE SERPL-SCNC: 106 MMOL/L (ref 100–111)
CO2 SERPL-SCNC: 31 MMOL/L (ref 21–32)
CREAT SERPL-MCNC: 0.83 MG/DL (ref 0.6–1.3)
DIFFERENTIAL METHOD BLD: ABNORMAL
EOSINOPHIL # BLD: 0.1 K/UL (ref 0–0.4)
EOSINOPHIL NFR BLD: 2 % (ref 0–5)
ERYTHROCYTE [DISTWIDTH] IN BLOOD BY AUTOMATED COUNT: 14.5 % (ref 11.6–14.5)
GLOBULIN SER CALC-MCNC: 4.2 G/DL (ref 2–4)
GLUCOSE SERPL-MCNC: 90 MG/DL (ref 74–99)
HCT VFR BLD AUTO: 38.8 % (ref 35–45)
HGB BLD-MCNC: 12 G/DL (ref 12–16)
IMM GRANULOCYTES # BLD AUTO: 0 K/UL (ref 0–0.04)
IMM GRANULOCYTES NFR BLD AUTO: 0 % (ref 0–0.5)
LYMPHOCYTES # BLD: 1.3 K/UL (ref 0.9–3.6)
LYMPHOCYTES NFR BLD: 28 % (ref 21–52)
MCH RBC QN AUTO: 26.9 PG (ref 24–34)
MCHC RBC AUTO-ENTMCNC: 30.9 G/DL (ref 31–37)
MCV RBC AUTO: 87 FL (ref 78–100)
MONOCYTES # BLD: 0.6 K/UL (ref 0.05–1.2)
MONOCYTES NFR BLD: 13 % (ref 3–10)
NEUTS SEG # BLD: 2.6 K/UL (ref 1.8–8)
NEUTS SEG NFR BLD: 56 % (ref 40–73)
NRBC # BLD: 0 K/UL (ref 0–0.01)
NRBC BLD-RTO: 0 PER 100 WBC
PLATELET # BLD AUTO: 236 K/UL (ref 135–420)
PMV BLD AUTO: 11.1 FL (ref 9.2–11.8)
POTASSIUM SERPL-SCNC: 4.6 MMOL/L (ref 3.5–5.5)
PROT SERPL-MCNC: 7.6 G/DL (ref 6.4–8.2)
RBC # BLD AUTO: 4.46 M/UL (ref 4.2–5.3)
SODIUM SERPL-SCNC: 140 MMOL/L (ref 136–145)
WBC # BLD AUTO: 4.6 K/UL (ref 4.6–13.2)

## 2021-12-16 PROCEDURE — 1101F PT FALLS ASSESS-DOCD LE1/YR: CPT | Performed by: NURSE PRACTITIONER

## 2021-12-16 PROCEDURE — 3017F COLORECTAL CA SCREEN DOC REV: CPT | Performed by: NURSE PRACTITIONER

## 2021-12-16 PROCEDURE — G8427 DOCREV CUR MEDS BY ELIG CLIN: HCPCS | Performed by: NURSE PRACTITIONER

## 2021-12-16 PROCEDURE — G8536 NO DOC ELDER MAL SCRN: HCPCS | Performed by: NURSE PRACTITIONER

## 2021-12-16 PROCEDURE — 1090F PRES/ABSN URINE INCON ASSESS: CPT | Performed by: NURSE PRACTITIONER

## 2021-12-16 PROCEDURE — G8419 CALC BMI OUT NRM PARAM NOF/U: HCPCS | Performed by: NURSE PRACTITIONER

## 2021-12-16 PROCEDURE — 80048 BASIC METABOLIC PNL TOTAL CA: CPT

## 2021-12-16 PROCEDURE — G8753 SYS BP > OR = 140: HCPCS | Performed by: NURSE PRACTITIONER

## 2021-12-16 PROCEDURE — G8754 DIAS BP LESS 90: HCPCS | Performed by: NURSE PRACTITIONER

## 2021-12-16 PROCEDURE — G8400 PT W/DXA NO RESULTS DOC: HCPCS | Performed by: NURSE PRACTITIONER

## 2021-12-16 PROCEDURE — 85025 COMPLETE CBC W/AUTO DIFF WBC: CPT

## 2021-12-16 PROCEDURE — 99213 OFFICE O/P EST LOW 20 MIN: CPT | Performed by: NURSE PRACTITIONER

## 2021-12-16 PROCEDURE — G8432 DEP SCR NOT DOC, RNG: HCPCS | Performed by: NURSE PRACTITIONER

## 2021-12-16 PROCEDURE — 80076 HEPATIC FUNCTION PANEL: CPT

## 2021-12-16 PROCEDURE — 36415 COLL VENOUS BLD VENIPUNCTURE: CPT

## 2021-12-16 NOTE — PROGRESS NOTES
Sandy Pratt MD, [place], Shaista Mckeon, Matt Cat MD, MPH      Arminda Huggins, PA-MUSTAPHA Foote, [facility]     Sandi Costa, [facility]   Torres Cuevas, [facility]    Dot Bowman, [facility]       Hallie Ascencio [address]    at [address], [address] [place] [address]    [address] [facility] 22.    [phone]    FAX: [address] [place] [address], [address]    [phone]    FAX: [phone]       Patient Care Team:  Johnathon Pimentel MD as PCP - General (Internal Medicine)      Problem List  Date Reviewed: 6/15/2021          Codes Class Noted    Autoimmune hepatitis [facility]) ICD-10-CM: K75.4  ICD-9-CM: 571.42  6/28/2017        Hypertension ICD-10-CM: I10  ICD-9-CM: 401.9  6/28/2017              Jenise Suh returns to the [facility] for management of autoimmune hepatitis. The active problem list, all pertinent past medical history, medications, liver histology, radiologic findings, and laboratory findings related to the liver disorder were reviewed with the patient. The patient is a 77 y.o. female who was first noted to have abnormalities in liver transaminases in 2012 and then developed more profound elevation in liver enzymes and jaundice while she was visiting family in [facility]. She was hospitalized and transferred to [facility]. A liver biopsy was performed. The findings were consistent with severe AIH and she was treated with high dose prednisone and cellcept. Prednisone was eventually tapered off. She remains on low dose cellcept. The most recent imaging of the liver was Ultrasound performed in 12/2020. Results suggest chronic liver disease. Assessment of liver fibrosis with Fibroscan was performed in 6/2021. The result was 4.5 kPa which correlates with no fibrosis. The CAP score of 141 does not suggest hepatic steatosis. The most recent laboratory studies indicate that the liver transaminases are normal, ALP is normal, tests of hepatic synthetic and metabolic function are normal, the platelet count is normal.     The patient has no symptoms which could be attributed to the liver disorder. The patient has not experienced fatigue, pain in the right side over the liver. The patient completes all daily activities without any functional limitations. ALLERGIES  No Known Allergies    MEDICATIONS  Current Outpatient Medications   Medication Sig    mycophenolate mofetil (CELLCEPT) 250 mg capsule take 1 capsule by mouth twice a day    calcium-cholecalciferol, d3, (CALCIUM 600 + D) 600-125 mg-unit tab Take  by mouth.  diclofenac (VOLTAREN) 1 % gel Apply 4 g to affected area two (2) times a day. Apply to affected area as directed.  hydroCHLOROthiazide (HYDRODIURIL) 12.5 mg tablet Take 12.5 mg by mouth daily. No current facility-administered medications for this visit. SYSTEM REVIEW NOT RELATED TO LIVER DISEASE OR REVIEWED ABOVE:  Constitution systems: Negative for fever, chills, weight gain, weight loss. Eyes: Negative for visual changes. ENT: Negative for sore throat, painful swallowing. Respiratory: Negative for cough, hemoptysis, SOB. Cardiology: Negative for chest pain, palpitations. GI:  Negative for constipation or diarrhea. : Negative for urinary frequency, dysuria, hematuria, nocturia. Skin: Negative for rash. Hematology: Negative for easy bruising, blood clots. Musculo-skelatal: Negative for back pain, muscle pain, weakness. Neurologic: Negative for headaches, dizziness, vertigo, memory problems not related to HE. Psychology: Negative for anxiety, depression. FAMILY HISTORY:  The father  of COPD. The mother  of breast cancer.     There is no family history of liver disease. There is no family history of immune disorders. SOCIAL HISTORY:  The patient is . The patient has 1 child. The patient has never used tobacco products. The patient has never consumed significant amounts of alcohol. The patient used to work as a . The patient retired in 2014. PHYSICAL EXAMINATION:  Visit Vitals  BP (!) 141/70 (BP 1 Location: Left upper arm, BP Patient Position: Sitting, BP Cuff Size: Small adult)   Pulse 60   Temp 97.1 °F (36.2 °C)   Resp 15   Ht 5' 7\" (1.702 m)   Wt 163 lb (73.9 kg)   SpO2 98%   BMI 25.53 kg/m²     General: No acute distress. Eyes: Sclera anicteric. ENT: No oral lesions. Thyroid normal.  Nodes: No adenopathy. Skin: No spider angiomata. No jaundice. No palmar erythema. Respiratory: Lungs clear to auscultation. Cardiovascular: Regular heart rate. No murmurs. No JVD. Abdomen: Soft non-tender. Liver size normal to percussion/palpation. Spleen not palpable. No obvious ascites. Extremities: No edema. No muscle wasting. No gross arthritic changes. Neurologic: Alert and oriented. Cranial nerves grossly intact. No asterixsis.     LABORATORY STUDIES:  Liver [place] of [address] Units 6/15/2021 12/17/2020   WBC 4.6 - 13.2 K/uL 5.9 4.7   ANC 1.8 - 8.0 K/UL 3.5 2.8   HGB 12.0 - 16.0 g/dL 12.4 12.6    - 420 K/uL 284 258   AST 10 - 38 U/L 14 13   ALT 13 - 56 U/L 20 16   Alk Phos 45 - 117 U/L 102 96   Bili, Total 0.2 - 1.0 MG/DL 0.5 0.5   Bili, Direct 0.0 - 0.2 MG/DL 0.1 0.1   Albumin 3.4 - 5.0 g/dL 4.0 3.7   BUN 7.0 - 18 MG/DL 16 16   Creat 0.6 - 1.3 MG/DL 0.77 0.75   Na 136 - 145 mmol/L 138 139   K 3.5 - 5.5 mmol/L 4.7 4.9   Cl 100 - 111 mmol/L 104 106   CO2 21 - 32 mmol/L 30 30   Glucose 74 - 99 mg/dL 84 83     SEROLOGIES:  Serologies Latest Ref Rng & Units 10/3/2017 6/28/2017   LEONILA, IFA  Positive (A) Negative   LEONILA Pattern  1:160 (H)    ASMCA 0 - 19 Units 27 (H) 31 (H)     LIVER HISTOLOGY:  6/2012. Liver biopsy at [facility]. Severe hepatitis with cholestasis consistent with AIH.    1/2017. Fiboscan at [facility]. Consistent with F0.  5/2018. Shear wave elastography performed at [facility]. EkPa was E Range: 3.26-4.76 kPa, E Mean: 3.89 kPa, E Median: 3.85 kPa, E Std: 0.44 kPa. The results suggested a fibrosis level of F0.  12/2018. Shear wave elastography performed at [facility]. EkPa was Range: 3.34- 4.35 kPa, E Mean: 3.85 kPa, E Median: 3.68 kPa. E Std: 0.39 kPa. The results suggested a fibrosis level of F0.  6/2021. [facility]Scan [facility]. EkPa was 4.5. IQR/med 16%. . The results suggested a fibrosis level of F0. The CAP score suggests there is no significant hepatic steatosis. ENDOSCOPIC PROCEDURES:  Not available or performed    RADIOLOGY:  12/2018. Ultrasound of liver. Echogenic consistent with chronic liver disease. No liver mass lesions. No dilated bile ducts. No ascites. 12/2019. Ultrasound of liver. Echogenic consistent with chronic liver disease. No liver mass lesions. No dilated bile ducts. No ascites. 6/2020. Ultrasound of liver. Echogenic consistent with cirrhosis. No liver mass lesions. No dilated bile ducts. No ascites. 12/2020. Ultrasound of liver. Echogenic consistent with chronic liver disease. No liver mass lesions. No dilated bile ducts. No ascites. OTHER TESTING:  Not available or performed    ASSESSMENT AND PLAN:  Autoimmune Hepatitis by liver biopsy in 2012. Liver transaminases are normal. Alkaline phosphate is normal. Liver function is normal. The platelet count is normal.      Based upon laboratory studies and imaging the patient does not appear to have significant liver injury. The patient is receiving treatment with Cellcept. The patient is responding to and is tolerating the treatment without significant side effects. Will perform laboratory testing to monitor liver function and degree of liver injury.  This included BMP, hepatic panel, CBC with platelet count. The need to perform an assessment of liver fibrosis was discussed with the patient. The Fibroscan can assess liver fibrosis and determine if a patient has advanced fibrosis or cirrhosis without the need for liver biopsy. The Fibroscan can be repeated annually or as often as clinically indicated to assess for fibrosis progression and/or regression. The patient was directed to continue all current medications at the current dosages. There are no contraindications for the patient to take any medications that are necessary for treatment of other medical issues. The patient was counseled regarding alcohol consumption. The need for vaccination against viral hepatitis A and B will be assessed with serologic and instituted as appropriate. FOLLOW-UP:  All of the issues listed above in the Assessment and Plan were discussed with the patient. All questions were answered. The patient expressed a clear understanding of the above. [address] [place] [address] in 6 months for routine monitoring. Fibroscan at next visit.          Manjula Martell, AGPCNP-BC  [address] Liver [place] of [address] [place] [address] [address]  [place], [address] [place] [address] Street [address] [phone]

## 2021-12-21 ENCOUNTER — TELEPHONE (OUTPATIENT)
Dept: HEMATOLOGY | Age: 76
End: 2021-12-21

## 2021-12-21 NOTE — TELEPHONE ENCOUNTER
----- Message from Chan Case NP sent at 12/20/2021  8:51 AM EST -----  Labs reviewed. All labs were either normal and/or abnormal values were not clinically meaningful to patient's current visit.

## 2022-02-21 ENCOUNTER — TRANSCRIBE ORDER (OUTPATIENT)
Dept: SCHEDULING | Age: 77
End: 2022-02-21

## 2022-02-21 DIAGNOSIS — Z12.31 VISIT FOR SCREENING MAMMOGRAM: Primary | ICD-10-CM

## 2022-03-09 ENCOUNTER — HOSPITAL ENCOUNTER (OUTPATIENT)
Dept: MAMMOGRAPHY | Age: 77
Discharge: HOME | End: 2022-03-09
Attending: INTERNAL MEDICINE
Payer: MEDICARE

## 2022-03-09 DIAGNOSIS — Z12.31 VISIT FOR SCREENING MAMMOGRAM: ICD-10-CM

## 2022-03-09 PROCEDURE — 77063 BREAST TOMOSYNTHESIS BI: CPT

## 2022-03-18 PROBLEM — I10 HYPERTENSION: Status: ACTIVE | Noted: 2017-06-28

## 2022-03-19 PROBLEM — K75.4 AUTOIMMUNE HEPATITIS (HCC): Status: ACTIVE | Noted: 2017-06-28

## 2022-06-23 ENCOUNTER — HOSPITAL ENCOUNTER (OUTPATIENT)
Dept: LAB | Age: 77
Discharge: HOME | End: 2022-06-23
Payer: MEDICARE

## 2022-06-23 ENCOUNTER — OFFICE VISIT (OUTPATIENT)
Dept: HEMATOLOGY | Age: 77
End: 2022-06-23
Payer: MEDICARE

## 2022-06-23 VITALS
WEIGHT: 155 LBS | HEART RATE: 58 BPM | SYSTOLIC BLOOD PRESSURE: 145 MMHG | RESPIRATION RATE: 14 BRPM | BODY MASS INDEX: 24.33 KG/M2 | DIASTOLIC BLOOD PRESSURE: 68 MMHG | TEMPERATURE: 97.5 F | HEIGHT: 67 IN | OXYGEN SATURATION: 98 %

## 2022-06-23 DIAGNOSIS — Z11.59 ENCOUNTER FOR SCREENING FOR OTHER VIRAL DISEASES: ICD-10-CM

## 2022-06-23 DIAGNOSIS — K75.4 AUTOIMMUNE HEPATITIS (HCC): Primary | ICD-10-CM

## 2022-06-23 DIAGNOSIS — K75.4 AUTOIMMUNE HEPATITIS (HCC): ICD-10-CM

## 2022-06-23 LAB
ALBUMIN SERPL-MCNC: 3.8 G/DL (ref 3.4–5)
ALBUMIN/GLOB SERPL: 0.9 {RATIO} (ref 0.8–1.7)
ALP SERPL-CCNC: 75 U/L (ref 45–117)
ALT SERPL-CCNC: 17 U/L (ref 13–56)
ANION GAP SERPL CALC-SCNC: 3 MMOL/L (ref 3–18)
AST SERPL-CCNC: 12 U/L (ref 10–38)
BASOPHILS # BLD: 0 K/UL (ref 0–0.1)
BASOPHILS NFR BLD: 1 % (ref 0–2)
BILIRUB DIRECT SERPL-MCNC: 0.1 MG/DL (ref 0–0.2)
BILIRUB SERPL-MCNC: 0.4 MG/DL (ref 0.2–1)
BUN SERPL-MCNC: 16 MG/DL (ref 7–18)
BUN/CREAT SERPL: 19 (ref 12–20)
CALCIUM SERPL-MCNC: 9.9 MG/DL (ref 8.5–10.1)
CHLORIDE SERPL-SCNC: 104 MMOL/L (ref 100–111)
CO2 SERPL-SCNC: 32 MMOL/L (ref 21–32)
CREAT SERPL-MCNC: 0.83 MG/DL (ref 0.6–1.3)
DIFFERENTIAL METHOD BLD: ABNORMAL
EOSINOPHIL # BLD: 0.1 K/UL (ref 0–0.4)
EOSINOPHIL NFR BLD: 2 % (ref 0–5)
ERYTHROCYTE [DISTWIDTH] IN BLOOD BY AUTOMATED COUNT: 14.4 % (ref 11.6–14.5)
GLOBULIN SER CALC-MCNC: 4.1 G/DL (ref 2–4)
GLUCOSE SERPL-MCNC: 96 MG/DL (ref 74–99)
HBV SURFACE AB SER QL IA: NEGATIVE
HBV SURFACE AB SERPL IA-ACNC: <3.1 MIU/ML
HCT VFR BLD AUTO: 38.6 % (ref 35–45)
HEP BS AB COMMENT,HBSAC: ABNORMAL
HGB BLD-MCNC: 12.1 G/DL (ref 12–16)
IMM GRANULOCYTES # BLD AUTO: 0 K/UL (ref 0–0.04)
IMM GRANULOCYTES NFR BLD AUTO: 0 % (ref 0–0.5)
LYMPHOCYTES # BLD: 1.1 K/UL (ref 0.9–3.6)
LYMPHOCYTES NFR BLD: 24 % (ref 21–52)
MCH RBC QN AUTO: 26.9 PG (ref 24–34)
MCHC RBC AUTO-ENTMCNC: 31.3 G/DL (ref 31–37)
MCV RBC AUTO: 85.8 FL (ref 78–100)
MONOCYTES # BLD: 0.5 K/UL (ref 0.05–1.2)
MONOCYTES NFR BLD: 12 % (ref 3–10)
NEUTS SEG # BLD: 2.7 K/UL (ref 1.8–8)
NEUTS SEG NFR BLD: 61 % (ref 40–73)
NRBC # BLD: 0 K/UL (ref 0–0.01)
NRBC BLD-RTO: 0 PER 100 WBC
PLATELET # BLD AUTO: 227 K/UL (ref 135–420)
PMV BLD AUTO: 11.2 FL (ref 9.2–11.8)
POTASSIUM SERPL-SCNC: 4.4 MMOL/L (ref 3.5–5.5)
PROT SERPL-MCNC: 7.9 G/DL (ref 6.4–8.2)
RBC # BLD AUTO: 4.5 M/UL (ref 4.2–5.3)
SODIUM SERPL-SCNC: 139 MMOL/L (ref 136–145)
WBC # BLD AUTO: 4.4 K/UL (ref 4.6–13.2)

## 2022-06-23 PROCEDURE — 1123F ACP DISCUSS/DSCN MKR DOCD: CPT | Performed by: NURSE PRACTITIONER

## 2022-06-23 PROCEDURE — 91200 LIVER ELASTOGRAPHY: CPT | Performed by: NURSE PRACTITIONER

## 2022-06-23 PROCEDURE — 80076 HEPATIC FUNCTION PANEL: CPT

## 2022-06-23 PROCEDURE — 1101F PT FALLS ASSESS-DOCD LE1/YR: CPT | Performed by: NURSE PRACTITIONER

## 2022-06-23 PROCEDURE — 36415 COLL VENOUS BLD VENIPUNCTURE: CPT

## 2022-06-23 PROCEDURE — 80048 BASIC METABOLIC PNL TOTAL CA: CPT

## 2022-06-23 PROCEDURE — 85025 COMPLETE CBC W/AUTO DIFF WBC: CPT

## 2022-06-23 PROCEDURE — 99213 OFFICE O/P EST LOW 20 MIN: CPT | Performed by: NURSE PRACTITIONER

## 2022-06-23 PROCEDURE — G8754 DIAS BP LESS 90: HCPCS | Performed by: NURSE PRACTITIONER

## 2022-06-23 PROCEDURE — G8420 CALC BMI NORM PARAMETERS: HCPCS | Performed by: NURSE PRACTITIONER

## 2022-06-23 PROCEDURE — G8432 DEP SCR NOT DOC, RNG: HCPCS | Performed by: NURSE PRACTITIONER

## 2022-06-23 PROCEDURE — G8536 NO DOC ELDER MAL SCRN: HCPCS | Performed by: NURSE PRACTITIONER

## 2022-06-23 PROCEDURE — 86706 HEP B SURFACE ANTIBODY: CPT

## 2022-06-23 PROCEDURE — G8427 DOCREV CUR MEDS BY ELIG CLIN: HCPCS | Performed by: NURSE PRACTITIONER

## 2022-06-23 PROCEDURE — 3017F COLORECTAL CA SCREEN DOC REV: CPT | Performed by: NURSE PRACTITIONER

## 2022-06-23 PROCEDURE — 86708 HEPATITIS A ANTIBODY: CPT

## 2022-06-23 PROCEDURE — G8753 SYS BP > OR = 140: HCPCS | Performed by: NURSE PRACTITIONER

## 2022-06-23 PROCEDURE — 1090F PRES/ABSN URINE INCON ASSESS: CPT | Performed by: NURSE PRACTITIONER

## 2022-06-23 PROCEDURE — G8400 PT W/DXA NO RESULTS DOC: HCPCS | Performed by: NURSE PRACTITIONER

## 2022-06-23 NOTE — PROGRESS NOTES
[address] [facility], MD, Ced, Shaista Cordell Mike, [place]      MITUL Colón, [facility]     Sandi ROSALES Igor, [facility]   Camille Altamirano TEODORO Luna, [facility]       Hallie Deputa Ant De L[address]    at [address], [address] Yanet Padron  22.    [phone]    FAX: [address] [place] [address], [address]    [phone]    FAX: [phone]     Patient Care Team:  Christie Mckenzie MD as PCP - General (Internal Medicine Physician)      Problem List  Date Reviewed: 6/23/2022          Codes Class Noted    Autoimmune hepatitis [facility]) ICD-10-CM: K75.4  ICD-9-CM: 571.42  6/28/2017        Hypertension ICD-10-CM: I10  ICD-9-CM: 401.9  6/28/2017              Leigh Ann Molina returns to the [address] for management of autoimmune hepatitis. The active problem list, all pertinent past medical history, medications, liver histology, radiologic findings, and laboratory findings related to the liver disorder were reviewed with the patient. The patient is a 77 y.o. female who was first noted to have abnormalities in liver transaminases in 2012 and then developed more profound elevation in liver enzymes and jaundice while she was visiting family in [facility]. She was hospitalized and transferred to [facility]. A liver biopsy was performed. The findings were consistent with severe AIH and she was treated with high dose prednisone and cellcept. Prednisone was eventually tapered off. She remains on low dose cellcept. The most recent imaging of the liver was Ultrasound performed in 12/2020. Results suggest chronic liver disease. Assessment of liver fibrosis with Fibroscan was performed in the office today.  The result was 2.3 kPa which correlates with no fibrosis. The CAP score of 244 does not suggest hepatic steatosis. The patient has no symptoms which could be attributed to the liver disorder. The patient has not experienced fatigue, pain in the right side over the liver. The patient completes all daily activities without any functional limitations. ALLERGIES  No Known Allergies    MEDICATIONS  Current Outpatient Medications   Medication Sig    mycophenolate mofetil (CELLCEPT) 250 mg capsule take 1 capsule by mouth twice a day    calcium-cholecalciferol, d3, (CALCIUM 600 + D) 600-125 mg-unit tab Take  by mouth.  diclofenac (VOLTAREN) 1 % gel Apply 4 g to affected area two (2) times a day. Apply to affected area as directed.  hydroCHLOROthiazide (HYDRODIURIL) 12.5 mg tablet Take 12.5 mg by mouth daily. No current facility-administered medications for this visit. SYSTEM REVIEW NOT RELATED TO LIVER DISEASE OR REVIEWED ABOVE:  Constitution systems: Negative for fever, chills, weight gain, weight loss. Eyes: Negative for visual changes. ENT: Negative for sore throat, painful swallowing. Respiratory: Negative for cough, hemoptysis, SOB. Cardiology: Negative for chest pain, palpitations. GI:  Negative for constipation or diarrhea. : Negative for urinary frequency, dysuria, hematuria, nocturia. Skin: Negative for rash. Hematology: Negative for easy bruising, blood clots. Musculo-skelatal: Negative for back pain, muscle pain, weakness. Neurologic: Negative for headaches, dizziness, vertigo, memory problems not related to HE. Psychology: Negative for anxiety, depression. FAMILY HISTORY:  The father  of COPD. The mother  of breast cancer. There is no family history of liver disease. There is no family history of immune disorders. SOCIAL HISTORY:  The patient is . The patient has 1 child. The patient has never used tobacco products.     The patient has never consumed significant amounts of alcohol. The patient used to work as a . The patient retired in 2014. PHYSICAL EXAMINATION:  Visit Vitals  BP (!) 145/68   Pulse (!) 58   Temp 97.5 °F (36.4 °C)   Resp 14   Ht 5' 7\" (1.702 m)   Wt 155 lb (70.3 kg)   SpO2 98%   BMI 24.28 kg/m²     General: No acute distress. Eyes: Sclera anicteric. ENT: No oral lesions. Thyroid normal.  Nodes: No adenopathy. Skin: No spider angiomata. No jaundice. No palmar erythema. Respiratory: Lungs clear to auscultation. Cardiovascular: Regular heart rate. No murmurs. No JVD. Abdomen: Soft non-tender. Liver size normal to percussion/palpation. Spleen not palpable. No obvious ascites. Extremities: No edema. No muscle wasting. No gross arthritic changes. Neurologic: Alert and oriented. Cranial nerves grossly intact. No asterixsis. LABORATORY STUDIES:  Liver [place] of [address] Units 6/15/2021   WBC 4.6 - 13.2 K/uL 5.9   ANC 1.8 - 8.0 K/UL 3.5   HGB 12.0 - 16.0 g/dL 12.4    - 420 K/uL 284   AST 10 - 38 U/L 14   ALT 13 - 56 U/L 20   Alk Phos 45 - 117 U/L 102   Bili, Total 0.2 - 1.0 MG/DL 0.5   Bili, Direct 0.0 - 0.2 MG/DL 0.1   Albumin 3.4 - 5.0 g/dL 4.0   BUN 7.0 - 18 MG/DL 16   Creat 0.6 - 1.3 MG/DL 0.77   Na 136 - 145 mmol/L 138   K 3.5 - 5.5 mmol/L 4.7   Cl 100 - 111 mmol/L 104   CO2 21 - 32 mmol/L 30   Glucose 74 - 99 mg/dL 84     SEROLOGIES:  Serologies Latest Ref Rng & Units 10/3/2017 6/28/2017   LEONILA, IFA  Positive (A) Negative   LEONILA Pattern  1:160 (H)    ASMCA 0 - 19 Units 27 (H) 31 (H)     LIVER HISTOLOGY:  6/2012. Liver biopsy at [facility]. Severe hepatitis with cholestasis consistent with AIH.      6/2021. FibroScan performed at [facility]. EkPa was 4.5. IQR/med 16%. . The results suggested a fibrosis level of F0. The CAP score suggests there is no significant hepatic steatosis. 6/2022.  FibroScan performed at [facility]. EkPa was 2.3. IQR/med 28%. . The results suggested a fibrosis level of F0. The CAP score suggests there is no significant hepatic steatosis. ENDOSCOPIC PROCEDURES:  Not available or performed    RADIOLOGY:  12/2018. Ultrasound of liver. Echogenic consistent with chronic liver disease. No liver mass lesions. No dilated bile ducts. No ascites. 12/2019. Ultrasound of liver. Echogenic consistent with chronic liver disease. No liver mass lesions. No dilated bile ducts. No ascites. 6/2020. Ultrasound of liver. Echogenic consistent with cirrhosis. No liver mass lesions. No dilated bile ducts. No ascites. 12/2020. Ultrasound of liver. Echogenic consistent with chronic liver disease. No liver mass lesions. No dilated bile ducts. No ascites. OTHER TESTING:  Not available or performed    ASSESSMENT AND PLAN:  Autoimmune Hepatitis by liver biopsy in 2012. Liver transaminases are normal. Alkaline phosphate is normal. Liver function is normal. The platelet count is normal.      Based upon laboratory studies and imaging the patient does not appear to have significant liver injury. The patient is receiving treatment with Cellcept. She has reduced dose to 250 mg daily as of 10/2021. The patient is responding to and is tolerating the treatment without significant side effects. Will perform laboratory testing to monitor liver function and degree of liver injury. This included BMP, hepatic panel, CBC with platelet count. The need to perform an assessment of liver fibrosis was discussed with the patient. The Fibroscan can assess liver fibrosis and determine if a patient has advanced fibrosis or cirrhosis without the need for liver biopsy. The Fibroscan can be repeated annually or as often as clinically indicated to assess for fibrosis progression and/or regression. The patient was directed to continue all current medications at the current dosages.   There are no contraindications for the patient to take any medications that are necessary for treatment of other medical issues. The patient was counseled regarding alcohol consumption. The need for vaccination against viral hepatitis A and B will be assessed with serologic and instituted as appropriate. FOLLOW-UP:  All of the issues listed above in the Assessment and Plan were discussed with the patient. All questions were answered. The patient expressed a clear understanding of the above. [address] [place] [address] in 6 months for routine monitoring.          Cherise Ganser, AGPCNP-BC  [address] Liver [place] [address], [address] [place] [address] - [address]  [phone]

## 2022-06-24 LAB — HAV AB SER QL IA: POSITIVE

## 2022-07-05 RX ORDER — MYCOPHENOLATE MOFETIL 250 MG/1
CAPSULE ORAL
Qty: 180 CAPSULE | Refills: 3 | Status: SHIPPED | OUTPATIENT
Start: 2022-07-05

## 2022-07-06 NOTE — PROGRESS NOTES
Labs reviewed. All labs were either normal and/or abnormal values were not clinically meaningful to patient's current visit. She has immunity to Hep A. Recommend HBV vaccine.

## 2022-07-07 ENCOUNTER — TELEPHONE (OUTPATIENT)
Dept: HEMATOLOGY | Age: 77
End: 2022-07-07

## 2022-07-07 NOTE — TELEPHONE ENCOUNTER
----- Message from Chan Case NP sent at 7/6/2022  1:42 PM EDT -----  Labs reviewed. All labs were either normal and/or abnormal values were not clinically meaningful to patient's current visit. She has immunity to Hep A. Recommend HBV vaccine.

## 2022-07-12 ENCOUNTER — TELEPHONE (OUTPATIENT)
Dept: HEMATOLOGY | Age: 77
End: 2022-07-12

## 2022-07-12 NOTE — TELEPHONE ENCOUNTER
----- Message from [facility] ABRAHAM Sands sent at 7/6/2022  1:42 PM EDT -----  Labs reviewed. All labs were either normal and/or abnormal values were not clinically meaningful to patient's current visit. She has immunity to Hep A. Recommend HBV vaccine.

## 2022-11-16 RX ORDER — MYCOPHENOLATE MOFETIL 250 MG/1
250 CAPSULE ORAL 2 TIMES DAILY
Qty: 180 CAPSULE | Refills: 3 | Status: SHIPPED | OUTPATIENT
Start: 2022-11-16

## 2022-12-12 ENCOUNTER — HOSPITAL ENCOUNTER (OUTPATIENT)
Dept: LAB | Age: 77
Discharge: HOME | End: 2022-12-12
Payer: MEDICARE

## 2022-12-12 ENCOUNTER — OFFICE VISIT (OUTPATIENT)
Dept: HEMATOLOGY | Age: 77
End: 2022-12-12
Payer: MEDICARE

## 2022-12-12 VITALS
HEIGHT: 67 IN | DIASTOLIC BLOOD PRESSURE: 65 MMHG | WEIGHT: 161 LBS | HEART RATE: 61 BPM | TEMPERATURE: 97.5 F | SYSTOLIC BLOOD PRESSURE: 121 MMHG | OXYGEN SATURATION: 99 % | BODY MASS INDEX: 25.27 KG/M2

## 2022-12-12 DIAGNOSIS — K75.4 AUTOIMMUNE HEPATITIS (HCC): ICD-10-CM

## 2022-12-12 DIAGNOSIS — K75.4 AUTOIMMUNE HEPATITIS (HCC): Primary | ICD-10-CM

## 2022-12-12 LAB
ALBUMIN SERPL-MCNC: 3.8 G/DL (ref 3.4–5)
ALBUMIN/GLOB SERPL: 0.9 {RATIO} (ref 0.8–1.7)
ALP SERPL-CCNC: 75 U/L (ref 45–117)
ALT SERPL-CCNC: 18 U/L (ref 13–56)
ANION GAP SERPL CALC-SCNC: 3 MMOL/L (ref 3–18)
AST SERPL-CCNC: 15 U/L (ref 10–38)
BASOPHILS # BLD: 0 K/UL (ref 0–0.1)
BASOPHILS NFR BLD: 1 % (ref 0–2)
BILIRUB DIRECT SERPL-MCNC: 0.1 MG/DL (ref 0–0.2)
BILIRUB SERPL-MCNC: 0.5 MG/DL (ref 0.2–1)
BUN SERPL-MCNC: 13 MG/DL (ref 7–18)
BUN/CREAT SERPL: 16 (ref 12–20)
CALCIUM SERPL-MCNC: 10.2 MG/DL (ref 8.5–10.1)
CHLORIDE SERPL-SCNC: 102 MMOL/L (ref 100–111)
CO2 SERPL-SCNC: 32 MMOL/L (ref 21–32)
CREAT SERPL-MCNC: 0.82 MG/DL (ref 0.6–1.3)
DIFFERENTIAL METHOD BLD: ABNORMAL
EOSINOPHIL # BLD: 0.1 K/UL (ref 0–0.4)
EOSINOPHIL NFR BLD: 2 % (ref 0–5)
ERYTHROCYTE [DISTWIDTH] IN BLOOD BY AUTOMATED COUNT: 14.6 % (ref 11.6–14.5)
GLOBULIN SER CALC-MCNC: 4.2 G/DL (ref 2–4)
GLUCOSE SERPL-MCNC: 88 MG/DL (ref 74–99)
HCT VFR BLD AUTO: 39 % (ref 35–45)
HGB BLD-MCNC: 12.2 G/DL (ref 12–16)
IMM GRANULOCYTES # BLD AUTO: 0 K/UL (ref 0–0.04)
IMM GRANULOCYTES NFR BLD AUTO: 0 % (ref 0–0.5)
LYMPHOCYTES # BLD: 1.1 K/UL (ref 0.9–3.6)
LYMPHOCYTES NFR BLD: 27 % (ref 21–52)
MCH RBC QN AUTO: 26.9 PG (ref 24–34)
MCHC RBC AUTO-ENTMCNC: 31.3 G/DL (ref 31–37)
MCV RBC AUTO: 85.9 FL (ref 78–100)
MONOCYTES # BLD: 0.5 K/UL (ref 0.05–1.2)
MONOCYTES NFR BLD: 11 % (ref 3–10)
NEUTS SEG # BLD: 2.5 K/UL (ref 1.8–8)
NEUTS SEG NFR BLD: 59 % (ref 40–73)
NRBC # BLD: 0 K/UL (ref 0–0.01)
NRBC BLD-RTO: 0 PER 100 WBC
PLATELET # BLD AUTO: 238 K/UL (ref 135–420)
PMV BLD AUTO: 10.8 FL (ref 9.2–11.8)
POTASSIUM SERPL-SCNC: 4.5 MMOL/L (ref 3.5–5.5)
PROT SERPL-MCNC: 8 G/DL (ref 6.4–8.2)
RBC # BLD AUTO: 4.54 M/UL (ref 4.2–5.3)
SODIUM SERPL-SCNC: 137 MMOL/L (ref 136–145)
WBC # BLD AUTO: 4.2 K/UL (ref 4.6–13.2)

## 2022-12-12 PROCEDURE — 80048 BASIC METABOLIC PNL TOTAL CA: CPT

## 2022-12-12 PROCEDURE — 1101F PT FALLS ASSESS-DOCD LE1/YR: CPT | Performed by: NURSE PRACTITIONER

## 2022-12-12 PROCEDURE — 85025 COMPLETE CBC W/AUTO DIFF WBC: CPT

## 2022-12-12 PROCEDURE — G8427 DOCREV CUR MEDS BY ELIG CLIN: HCPCS | Performed by: NURSE PRACTITIONER

## 2022-12-12 PROCEDURE — 36415 COLL VENOUS BLD VENIPUNCTURE: CPT

## 2022-12-12 PROCEDURE — 99213 OFFICE O/P EST LOW 20 MIN: CPT | Performed by: NURSE PRACTITIONER

## 2022-12-12 PROCEDURE — G8536 NO DOC ELDER MAL SCRN: HCPCS | Performed by: NURSE PRACTITIONER

## 2022-12-12 PROCEDURE — 1123F ACP DISCUSS/DSCN MKR DOCD: CPT | Performed by: NURSE PRACTITIONER

## 2022-12-12 PROCEDURE — G8752 SYS BP LESS 140: HCPCS | Performed by: NURSE PRACTITIONER

## 2022-12-12 PROCEDURE — 3078F DIAST BP <80 MM HG: CPT | Performed by: NURSE PRACTITIONER

## 2022-12-12 PROCEDURE — G8754 DIAS BP LESS 90: HCPCS | Performed by: NURSE PRACTITIONER

## 2022-12-12 PROCEDURE — 80076 HEPATIC FUNCTION PANEL: CPT

## 2022-12-12 PROCEDURE — G8417 CALC BMI ABV UP PARAM F/U: HCPCS | Performed by: NURSE PRACTITIONER

## 2022-12-12 PROCEDURE — G8432 DEP SCR NOT DOC, RNG: HCPCS | Performed by: NURSE PRACTITIONER

## 2022-12-12 PROCEDURE — G8400 PT W/DXA NO RESULTS DOC: HCPCS | Performed by: NURSE PRACTITIONER

## 2022-12-12 PROCEDURE — 3074F SYST BP LT 130 MM HG: CPT | Performed by: NURSE PRACTITIONER

## 2022-12-12 PROCEDURE — 1090F PRES/ABSN URINE INCON ASSESS: CPT | Performed by: NURSE PRACTITIONER

## 2022-12-12 NOTE — PROGRESS NOTES
[address], MD, [address], [place], [place]      MITUL Mock, [facility]     Sandi Costa, [facility]   JOSE ANTONIO Joy, [address], [address] Yanet Padron  22.    [phone]    FAX: [address] [place] [address] Drive, [address] [place] [address], [address]    [phone]    FAX: [phone]     Patient Care Team:  Fer Cevallos MD as PCP - General (Internal Medicine Physician)      Problem List  Date Reviewed: 6/23/2022            Codes Class Noted    Autoimmune hepatitis [facility]) ICD-10-CM: K75.4  ICD-9-CM: 571.42  6/28/2017        Hypertension ICD-10-CM: I10  ICD-9-CM: 401.9  6/28/2017           Yoel Mascorro returns to the [facility] for management of autoimmune hepatitis. The active problem list, all pertinent past medical history, medications, liver histology, radiologic findings, and laboratory findings related to the liver disorder were reviewed with the patient. The patient is a 69 y.o. female who was first noted to have abnormalities in liver transaminases in 2012 and then developed more profound elevation in liver enzymes and jaundice while she was visiting family in [facility]. She was hospitalized and transferred to [facility]. A liver biopsy was performed. The findings were consistent with severe AIH and she was treated with high dose prednisone and cellcept. Prednisone was eventually tapered off. She remains on low dose cellcept. The most recent imaging of the liver was Ultrasound performed in 12/2020. Results suggest chronic liver disease. Assessment of liver fibrosis with Fibroscan was performed in 6/2022.  The result was 2.3 kPa which correlates with no fibrosis. The CAP score of 244 does not suggest hepatic steatosis. The patient has no symptoms which could be attributed to the liver disorder. The patient has not experienced fatigue, pain in the right side over the liver. The patient completes all daily activities without any functional limitations. ASSESSMENT AND PLAN:  Autoimmune Hepatitis     This was diagnosed by liver biopsy in 2012. Liver transaminases are normal. Alkaline phosphate is normal. Liver function is normal. The platelet count is normal.      Based upon laboratory studies and imaging the patient does not appear to have significant liver injury. The patient is receiving treatment with Cellcept. She has reduced dose to 250 mg daily as of 10/2021. The patient is responding to and is tolerating the treatment without significant side effects. Will perform laboratory testing to monitor liver function and degree of liver injury. This included BMP, hepatic panel, CBC with platelet count. The need to perform an assessment of liver fibrosis was discussed with the patient. The Fibroscan can assess liver fibrosis and determine if a patient has advanced fibrosis or cirrhosis without the need for liver biopsy. The Fibroscan can be repeated annually or as often as clinically indicated to assess for fibrosis progression and/or regression. Treatment of other medical problems in patients with chronic liver disease  There are no contraindications for the patient to take any medications that are necessary for treatment of other medical issues. Counseling for alcohol in patients with chronic liver disease  The patient was counseled regarding alcohol consumption and the effect of alcohol on chronic liver disease. The patient does not consume any significant amount of alcohol.     Vaccinations   Vaccination for viral hepatitis B is recommended since the patient has no serologic evidence of previous exposure or vaccination with immunity. Vaccination for viral hepatitis A is not needed. The patient has serologic evidence of prior exposure or vaccination with immunity. Routine vaccinations against other bacterial and viral agents can be performed as indicated. Annual flu vaccination should be administered if indicated. ALLERGIES  No Known Allergies    MEDICATIONS  Current Outpatient Medications   Medication Sig    mycophenolate mofetil (CELLCEPT) 250 mg capsule Take 1 Capsule by mouth two (2) times a day. calcium-cholecalciferol, d3, 600-125 mg-unit tab Take  by mouth. diclofenac (VOLTAREN) 1 % gel Apply 4 g to affected area two (2) times a day. Apply to affected area as directed. hydroCHLOROthiazide (HYDRODIURIL) 12.5 mg tablet Take 12.5 mg by mouth daily. No current facility-administered medications for this visit. SYSTEM REVIEW NOT RELATED TO LIVER DISEASE OR REVIEWED ABOVE:  Constitution systems: Negative for fever, chills, weight gain, weight loss. Eyes: Negative for visual changes. ENT: Negative for sore throat, painful swallowing. Respiratory: Negative for cough, hemoptysis, SOB. Cardiology: Negative for chest pain, palpitations. GI:  Negative for constipation or diarrhea. : Negative for urinary frequency, dysuria, hematuria, nocturia. Skin: Negative for rash. Hematology: Negative for easy bruising, blood clots. Musculo-skelatal: Negative for back pain, muscle pain, weakness. Neurologic: Negative for headaches, dizziness, vertigo, memory problems not related to HE. Psychology: Negative for anxiety, depression. FAMILY HISTORY:  The father  of COPD. The mother  of breast cancer. There is no family history of liver disease. There is no family history of immune disorders. SOCIAL HISTORY:  The patient is . The patient has 1 child. The patient has never used tobacco products.     The patient has never consumed significant amounts of alcohol. The patient used to work as a . The patient retired in 2014. PHYSICAL EXAMINATION:  Visit Vitals  /65   Pulse 61   Temp 97.5 °F (36.4 °C)   Ht 5' 7\" (1.702 m)   Wt 161 lb (73 kg)   SpO2 99%   BMI 25.22 kg/m²     General: No acute distress. Eyes: Sclera anicteric. ENT: No oral lesions. Thyroid normal.  Nodes: No adenopathy. Skin: No spider angiomata. No jaundice. No palmar erythema. Respiratory: Lungs clear to auscultation. Cardiovascular: Regular heart rate. No murmurs. No JVD. Abdomen: Soft non-tender. Liver size normal to percussion/palpation. Spleen not palpable. No obvious ascites. Extremities: No edema. No muscle wasting. No gross arthritic changes. Neurologic: Alert and oriented. Cranial nerves grossly intact. No asterixsis. LABORATORY STUDIES:  Liver [place] of [address] Units 12/12/2022 6/23/2022   WBC 4.6 - 13.2 K/uL 4.2 (L) 4.4 (L)   ANC 1.8 - 8.0 K/UL 2.5 2.7   HGB 12.0 - 16.0 g/dL 12.2 12.1    - 420 K/uL 238 227   AST 10 - 38 U/L 15 12   ALT 13 - 56 U/L 18 17   Alk Phos 45 - 117 U/L 75 75   Bili, Total 0.2 - 1.0 MG/DL 0.5 0.4   Bili, Direct 0.0 - 0.2 MG/DL 0.1 0.1   Albumin 3.4 - 5.0 g/dL 3.8 3.8   BUN 7.0 - 18 MG/DL 13 16   Creat 0.6 - 1.3 MG/DL 0.82 0.83   Na 136 - 145 mmol/L 137 139   K 3.5 - 5.5 mmol/L 4.5 4.4   Cl 100 - 111 mmol/L 102 104   CO2 21 - 32 mmol/L 32 32   Glucose 74 - 99 mg/dL 88 96     SEROLOGIES:  Serologies Latest Ref Rng & Units 10/3/2017 6/28/2017   LEONILA, IFA  Positive (A) Negative   LEONILA Pattern  1:160 (H)    ASMCA 0 - 19 Units 27 (H) 31 (H)     LIVER HISTOLOGY:  6/2012. Liver biopsy at [facility]. Severe hepatitis with cholestasis consistent with AIH.      6/2021. FibroScan performed at [address]. EkPa was 4.5. IQR/med 16%. . The results suggested a fibrosis level of F0. The CAP score suggests there is no significant hepatic steatosis. 6/2022.  [facility] at [facility]. EkPa was 2.3. IQR/med 28%. . The results suggested a fibrosis level of F0. The CAP score suggests there is no significant hepatic steatosis. ENDOSCOPIC PROCEDURES:  Not available or performed    RADIOLOGY:  12/2018. Ultrasound of liver. Echogenic consistent with chronic liver disease. No liver mass lesions. No dilated bile ducts. No ascites. 12/2019. Ultrasound of liver. Echogenic consistent with chronic liver disease. No liver mass lesions. No dilated bile ducts. No ascites. 6/2020. Ultrasound of liver. Echogenic consistent with cirrhosis. No liver mass lesions. No dilated bile ducts. No ascites. 12/2020. Ultrasound of liver. Echogenic consistent with chronic liver disease. No liver mass lesions. No dilated bile ducts. No ascites. OTHER TESTING:  Not available or performed    FOLLOW-UP:  All of the issues listed above in the Assessment and Plan were discussed with the patient. All questions were answered. The patient expressed a clear understanding of the above.     Follow-up Igor Snell 32 in 6 months for routine monitoring and IVANNA Alvarez-BC  [address], [facility] [address]  [facility], [address]  [phone]

## 2023-02-09 ENCOUNTER — TRANSCRIBE ORDER (OUTPATIENT)
Dept: SCHEDULING | Age: 78
End: 2023-02-09

## 2023-02-09 DIAGNOSIS — Z12.31 BREAST CANCER SCREENING BY MAMMOGRAM: Primary | ICD-10-CM

## 2023-02-23 DIAGNOSIS — Z12.31 BREAST CANCER SCREENING BY MAMMOGRAM: Primary | ICD-10-CM

## 2023-04-04 ENCOUNTER — HOSPITAL ENCOUNTER (OUTPATIENT)
Dept: WOMENS IMAGING | Facility: HOSPITAL | Age: 78
Discharge: HOME | End: 2023-04-07
Payer: MEDICARE

## 2023-04-04 DIAGNOSIS — Z12.31 SCREENING MAMMOGRAM FOR HIGH-RISK PATIENT: ICD-10-CM

## 2023-04-04 PROCEDURE — 77063 BREAST TOMOSYNTHESIS BI: CPT

## 2023-06-19 ENCOUNTER — HOSPITAL ENCOUNTER (OUTPATIENT)
Facility: HOSPITAL | Age: 78
Setting detail: SPECIMEN
Discharge: HOME | End: 2023-06-22
Payer: MEDICARE

## 2023-06-19 ENCOUNTER — OFFICE VISIT (OUTPATIENT)
Age: 78
End: 2023-06-19
Payer: MEDICARE

## 2023-06-19 VITALS
HEIGHT: 67 IN | DIASTOLIC BLOOD PRESSURE: 65 MMHG | BODY MASS INDEX: 25.74 KG/M2 | WEIGHT: 164 LBS | OXYGEN SATURATION: 98 % | SYSTOLIC BLOOD PRESSURE: 131 MMHG | HEART RATE: 69 BPM | TEMPERATURE: 97 F

## 2023-06-19 DIAGNOSIS — K75.4 AUTOIMMUNE HEPATITIS (HCC): Primary | ICD-10-CM

## 2023-06-19 LAB
ALBUMIN SERPL-MCNC: 3.9 G/DL (ref 3.4–5)
ALBUMIN/GLOB SERPL: 0.9 (ref 0.8–1.7)
ALP SERPL-CCNC: 89 U/L (ref 45–117)
ALT SERPL-CCNC: 19 U/L (ref 13–56)
ANION GAP SERPL CALC-SCNC: 2 MMOL/L (ref 3–18)
AST SERPL-CCNC: 16 U/L (ref 10–38)
BASOPHILS # BLD: 0 K/UL (ref 0–0.1)
BASOPHILS NFR BLD: 0 % (ref 0–2)
BILIRUB DIRECT SERPL-MCNC: 0.2 MG/DL (ref 0–0.2)
BILIRUB SERPL-MCNC: 0.6 MG/DL (ref 0.2–1)
BUN SERPL-MCNC: 14 MG/DL (ref 7–18)
BUN/CREAT SERPL: 16 (ref 12–20)
CALCIUM SERPL-MCNC: 10 MG/DL (ref 8.5–10.1)
CHLORIDE SERPL-SCNC: 105 MMOL/L (ref 100–111)
CO2 SERPL-SCNC: 31 MMOL/L (ref 21–32)
CREAT SERPL-MCNC: 0.86 MG/DL (ref 0.6–1.3)
DIFFERENTIAL METHOD BLD: ABNORMAL
EOSINOPHIL # BLD: 0.1 K/UL (ref 0–0.4)
EOSINOPHIL NFR BLD: 3 % (ref 0–5)
ERYTHROCYTE [DISTWIDTH] IN BLOOD BY AUTOMATED COUNT: 14.5 % (ref 11.6–14.5)
GLOBULIN SER CALC-MCNC: 4.3 G/DL (ref 2–4)
GLUCOSE SERPL-MCNC: 94 MG/DL (ref 74–99)
HCT VFR BLD AUTO: 40.3 % (ref 35–45)
HGB BLD-MCNC: 12.4 G/DL (ref 12–16)
IMM GRANULOCYTES # BLD AUTO: 0 K/UL (ref 0–0.04)
IMM GRANULOCYTES NFR BLD AUTO: 0 % (ref 0–0.5)
LYMPHOCYTES # BLD: 1.3 K/UL (ref 0.9–3.6)
LYMPHOCYTES NFR BLD: 27 % (ref 21–52)
MCH RBC QN AUTO: 26.6 PG (ref 24–34)
MCHC RBC AUTO-ENTMCNC: 30.8 G/DL (ref 31–37)
MCV RBC AUTO: 86.5 FL (ref 78–100)
MONOCYTES # BLD: 0.6 K/UL (ref 0.05–1.2)
MONOCYTES NFR BLD: 12 % (ref 3–10)
NEUTS SEG # BLD: 2.9 K/UL (ref 1.8–8)
NEUTS SEG NFR BLD: 58 % (ref 40–73)
NRBC # BLD: 0 K/UL (ref 0–0.01)
NRBC BLD-RTO: 0 PER 100 WBC
PLATELET # BLD AUTO: 255 K/UL (ref 135–420)
PMV BLD AUTO: 10.8 FL (ref 9.2–11.8)
POTASSIUM SERPL-SCNC: 4.8 MMOL/L (ref 3.5–5.5)
PROT SERPL-MCNC: 8.2 G/DL (ref 6.4–8.2)
RBC # BLD AUTO: 4.66 M/UL (ref 4.2–5.3)
SODIUM SERPL-SCNC: 138 MMOL/L (ref 136–145)
WBC # BLD AUTO: 4.9 K/UL (ref 4.6–13.2)

## 2023-06-19 PROCEDURE — 36415 COLL VENOUS BLD VENIPUNCTURE: CPT

## 2023-06-19 PROCEDURE — 1090F PRES/ABSN URINE INCON ASSESS: CPT | Performed by: NURSE PRACTITIONER

## 2023-06-19 PROCEDURE — 85025 COMPLETE CBC W/AUTO DIFF WBC: CPT

## 2023-06-19 PROCEDURE — G8400 PT W/DXA NO RESULTS DOC: HCPCS | Performed by: NURSE PRACTITIONER

## 2023-06-19 PROCEDURE — 80076 HEPATIC FUNCTION PANEL: CPT

## 2023-06-19 PROCEDURE — 1123F ACP DISCUSS/DSCN MKR DOCD: CPT | Performed by: NURSE PRACTITIONER

## 2023-06-19 PROCEDURE — 3078F DIAST BP <80 MM HG: CPT | Performed by: NURSE PRACTITIONER

## 2023-06-19 PROCEDURE — 3075F SYST BP GE 130 - 139MM HG: CPT | Performed by: NURSE PRACTITIONER

## 2023-06-19 PROCEDURE — G8419 CALC BMI OUT NRM PARAM NOF/U: HCPCS | Performed by: NURSE PRACTITIONER

## 2023-06-19 PROCEDURE — G8427 DOCREV CUR MEDS BY ELIG CLIN: HCPCS | Performed by: NURSE PRACTITIONER

## 2023-06-19 PROCEDURE — 99213 OFFICE O/P EST LOW 20 MIN: CPT | Performed by: NURSE PRACTITIONER

## 2023-06-19 PROCEDURE — 1036F TOBACCO NON-USER: CPT | Performed by: NURSE PRACTITIONER

## 2023-06-19 PROCEDURE — 91200 LIVER ELASTOGRAPHY: CPT | Performed by: NURSE PRACTITIONER

## 2023-06-19 PROCEDURE — 80048 BASIC METABOLIC PNL TOTAL CA: CPT

## 2023-06-19 RX ORDER — MYCOPHENOLATE MOFETIL 250 MG/1
250 CAPSULE ORAL 2 TIMES DAILY
Qty: 180 CAPSULE | Refills: 3 | OUTPATIENT
Start: 2023-06-19 | End: 2023-06-19 | Stop reason: SDUPTHER

## 2023-06-19 RX ORDER — MYCOPHENOLATE MOFETIL 250 MG/1
250 CAPSULE ORAL 2 TIMES DAILY
Qty: 180 CAPSULE | Refills: 3 | Status: SHIPPED | OUTPATIENT
Start: 2023-06-19

## 2023-06-19 ASSESSMENT — PATIENT HEALTH QUESTIONNAIRE - PHQ9
1. LITTLE INTEREST OR PLEASURE IN DOING THINGS: 0
2. FEELING DOWN, DEPRESSED OR HOPELESS: 0
SUM OF ALL RESPONSES TO PHQ QUESTIONS 1-9: 0
SUM OF ALL RESPONSES TO PHQ9 QUESTIONS 1 & 2: 0
SUM OF ALL RESPONSES TO PHQ QUESTIONS 1-9: 0

## 2023-06-19 NOTE — PROGRESS NOTES
[address] [facility], MD, FACP, [place], [place]      RICHA Ceballos, PCNP-BC   Denae Hernández, [facility]-   Samantha Martinez, FNP-GENO Lara FNP-C   Rogelio Torres, AGPCNP-BC   [facility]      [address]   at [address], [facility] Wes Batista  22.   [phone]   FAX: [phone]   at [address], [address] [place] [address], [address]   [phone]   FAX: [phone]       Patient Care Team:  Dana Mahan MD as PCP - General (Internal Medicine Physician)      Patient Active Problem List   Diagnosis    Hypertension    Autoimmune hepatitis [facility])       Andressa Nguyen returns to the [address] for management of autoimmune hepatitis. The active problem list, all pertinent past medical history, medications, liver histology, radiologic findings, and laboratory findings related to the liver disorder were reviewed with the patient. The patient is a 69 y. o.female who was first noted to have abnormalities in liver transaminases in 2012 and then developed more profound elevation in liver enzymes and jaundice while she was visiting family in [facility]. She was hospitalized and transferred to [facility]. A liver biopsy was performed. The findings were consistent with severe AIH and she was treated with high dose prednisone and cellcept. Prednisone was eventually tapered off. She remains on low dose cellcept. The most recent imaging of the liver was Ultrasound performed in 12/2020. Results suggest chronic liver disease. Assessment of liver fibrosis with Fibroscan was performed in the office today. The result was 3.2 kPa which correlates with no fibrosis. The CAP score of 327 suggests hepatic steatosis.     The patient

## 2023-08-06 NOTE — PROGRESS NOTES
1. Have you been to the ER, urgent care clinic since your last visit? Hospitalized since your last visit? Yes [facility]    2. Have you seen or consulted any other health care providers outside of the [address] [place] [address] since your last visit? Include any pap smears or colon screening. Yes . Vaginal delivery

## 2023-12-12 ENCOUNTER — OFFICE VISIT (OUTPATIENT)
Age: 78
End: 2023-12-12
Payer: MEDICARE

## 2023-12-12 VITALS
WEIGHT: 164.4 LBS | BODY MASS INDEX: 25.8 KG/M2 | HEART RATE: 58 BPM | HEIGHT: 67 IN | TEMPERATURE: 98.7 F | OXYGEN SATURATION: 98 % | RESPIRATION RATE: 20 BRPM | DIASTOLIC BLOOD PRESSURE: 58 MMHG | SYSTOLIC BLOOD PRESSURE: 144 MMHG

## 2023-12-12 DIAGNOSIS — K75.4 AUTOIMMUNE HEPATITIS (HCC): Primary | ICD-10-CM

## 2023-12-12 PROCEDURE — 99213 OFFICE O/P EST LOW 20 MIN: CPT | Performed by: NURSE PRACTITIONER

## 2023-12-12 PROCEDURE — 1123F ACP DISCUSS/DSCN MKR DOCD: CPT | Performed by: NURSE PRACTITIONER

## 2023-12-12 PROCEDURE — 1090F PRES/ABSN URINE INCON ASSESS: CPT | Performed by: NURSE PRACTITIONER

## 2023-12-12 PROCEDURE — 3077F SYST BP >= 140 MM HG: CPT | Performed by: NURSE PRACTITIONER

## 2023-12-12 PROCEDURE — G8484 FLU IMMUNIZE NO ADMIN: HCPCS | Performed by: NURSE PRACTITIONER

## 2023-12-12 PROCEDURE — G8427 DOCREV CUR MEDS BY ELIG CLIN: HCPCS | Performed by: NURSE PRACTITIONER

## 2023-12-12 PROCEDURE — G8419 CALC BMI OUT NRM PARAM NOF/U: HCPCS | Performed by: NURSE PRACTITIONER

## 2023-12-12 PROCEDURE — 1036F TOBACCO NON-USER: CPT | Performed by: NURSE PRACTITIONER

## 2023-12-12 PROCEDURE — G8400 PT W/DXA NO RESULTS DOC: HCPCS | Performed by: NURSE PRACTITIONER

## 2023-12-12 PROCEDURE — 3078F DIAST BP <80 MM HG: CPT | Performed by: NURSE PRACTITIONER

## 2023-12-12 NOTE — PROGRESS NOTES
MD Cynthia, FACP, [place], [place]      RICHA Chung, AGPCNP-BC   Laura Garrison, SHYANN-KANDACE Bartholomew, ANY Vidal, DAVIDA-GENO Sherman, AGPCNP-BC   [facility]      [address]   at [facility]   [facility], [address] [facility], [facility]   [phone]   FAX: [address]   at [facility], [address] [facility], [address]   [phone]   FAX: [phone]     Patient Care Team:  Demar Jeffery MD as PCP - General (Internal Medicine Physician)    Patient Active Problem List   Diagnosis    Hypertension    Autoimmune hepatitis [facility])     Gardena Mines returns to the [address] [place] [address],[address] [facility] for management of autoimmune hepatitis. The active problem list, all pertinent past medical history, medications, liver histology, radiologic findings, and laboratory findings related to the liver disorder were reviewed with the patient. The patient is a 69 y. o.female who was first noted to have abnormalities in liver transaminases in 2012 and then developed more profound elevation in liver enzymes and jaundice while she was visiting family in [address] [place] [address]. She was hospitalized and transferred to [facility]. A liver biopsy was performed. The findings were consistent with severe AIH and she was treated with high dose prednisone and cellcept. Prednisone was eventually tapered off. She remains on low dose cellcept. The most recent imaging of the liver was Ultrasound performed in 12/2020. Results suggest chronic liver disease. Assessment of liver fibrosis with Fibroscan was performed in 6/2023. The result was 3.2 kPa which correlates with no fibrosis. The CAP score of 327 suggests hepatic steatosis.     The patient has no symptoms

## 2024-04-22 ENCOUNTER — TRANSCRIBE ORDERS (OUTPATIENT)
Facility: HOSPITAL | Age: 79
End: 2024-04-22

## 2024-04-22 DIAGNOSIS — Z12.31 VISIT FOR SCREENING MAMMOGRAM: Primary | ICD-10-CM

## 2024-04-26 ENCOUNTER — HOSPITAL ENCOUNTER (OUTPATIENT)
Dept: WOMENS IMAGING | Facility: HOSPITAL | Age: 79
End: 2024-04-26
Payer: MEDICARE

## 2024-04-26 DIAGNOSIS — Z12.31 VISIT FOR SCREENING MAMMOGRAM: ICD-10-CM

## 2024-04-26 PROCEDURE — 77063 BREAST TOMOSYNTHESIS BI: CPT

## 2024-06-20 ENCOUNTER — HOSPITAL ENCOUNTER (OUTPATIENT)
Facility: HOSPITAL | Age: 79
Discharge: HOME | End: 2024-06-20
Payer: MEDICARE

## 2024-06-20 DIAGNOSIS — K75.4 AUTOIMMUNE HEPATITIS (HCC): ICD-10-CM

## 2024-06-20 LAB
ALBUMIN SERPL-MCNC: 3.7 G/DL (ref 3.4–5)
ALBUMIN/GLOB SERPL: 0.9 (ref 0.8–1.7)
ALP SERPL-CCNC: 87 U/L (ref 45–117)
ALT SERPL-CCNC: 20 U/L (ref 13–56)
ANION GAP SERPL CALC-SCNC: 4 MMOL/L (ref 3–18)
AST SERPL-CCNC: 13 U/L (ref 10–38)
BASOPHILS # BLD: 0 K/UL (ref 0–0.1)
BASOPHILS NFR BLD: 1 % (ref 0–2)
BILIRUB DIRECT SERPL-MCNC: 0.2 MG/DL (ref 0–0.2)
BILIRUB SERPL-MCNC: 0.4 MG/DL (ref 0.2–1)
BUN SERPL-MCNC: 16 MG/DL (ref 7–18)
BUN/CREAT SERPL: 19 (ref 12–20)
CALCIUM SERPL-MCNC: 10.1 MG/DL (ref 8.5–10.1)
CHLORIDE SERPL-SCNC: 106 MMOL/L (ref 100–111)
CO2 SERPL-SCNC: 29 MMOL/L (ref 21–32)
CREAT SERPL-MCNC: 0.84 MG/DL (ref 0.6–1.3)
DIFFERENTIAL METHOD BLD: ABNORMAL
EOSINOPHIL # BLD: 0.2 K/UL (ref 0–0.4)
EOSINOPHIL NFR BLD: 3 % (ref 0–5)
ERYTHROCYTE [DISTWIDTH] IN BLOOD BY AUTOMATED COUNT: 14.2 % (ref 11.6–14.5)
GLOBULIN SER CALC-MCNC: 4.2 G/DL (ref 2–4)
GLUCOSE SERPL-MCNC: 100 MG/DL (ref 74–99)
HCT VFR BLD AUTO: 39.2 % (ref 35–45)
HGB BLD-MCNC: 12.6 G/DL (ref 12–16)
IMM GRANULOCYTES # BLD AUTO: 0 K/UL (ref 0–0.04)
IMM GRANULOCYTES NFR BLD AUTO: 0 % (ref 0–0.5)
LYMPHOCYTES # BLD: 1.4 K/UL (ref 0.9–3.6)
LYMPHOCYTES NFR BLD: 26 % (ref 21–52)
MCH RBC QN AUTO: 27.3 PG (ref 24–34)
MCHC RBC AUTO-ENTMCNC: 32.1 G/DL (ref 31–37)
MCV RBC AUTO: 85 FL (ref 78–100)
MONOCYTES # BLD: 0.6 K/UL (ref 0.05–1.2)
MONOCYTES NFR BLD: 11 % (ref 3–10)
NEUTS SEG # BLD: 3.1 K/UL (ref 1.8–8)
NEUTS SEG NFR BLD: 59 % (ref 40–73)
NRBC # BLD: 0 K/UL (ref 0–0.01)
NRBC BLD-RTO: 0 PER 100 WBC
PLATELET # BLD AUTO: 231 K/UL (ref 135–420)
PMV BLD AUTO: 10.8 FL (ref 9.2–11.8)
POTASSIUM SERPL-SCNC: 4.1 MMOL/L (ref 3.5–5.5)
PROT SERPL-MCNC: 7.9 G/DL (ref 6.4–8.2)
RBC # BLD AUTO: 4.61 M/UL (ref 4.2–5.3)
SODIUM SERPL-SCNC: 139 MMOL/L (ref 136–145)
WBC # BLD AUTO: 5.3 K/UL (ref 4.6–13.2)

## 2024-06-20 PROCEDURE — 80048 BASIC METABOLIC PNL TOTAL CA: CPT

## 2024-06-20 PROCEDURE — 80076 HEPATIC FUNCTION PANEL: CPT

## 2024-06-20 PROCEDURE — 85025 COMPLETE CBC W/AUTO DIFF WBC: CPT

## 2024-06-20 PROCEDURE — 36415 COLL VENOUS BLD VENIPUNCTURE: CPT

## 2024-06-26 RX ORDER — AMOXICILLIN 500 MG/1
CAPSULE ORAL
COMMUNITY

## 2024-06-26 RX ORDER — BRIMONIDINE TARTRATE, TIMOLOL MALEATE 2; 5 MG/ML; MG/ML
1 SOLUTION/ DROPS OPHTHALMIC DAILY
COMMUNITY
Start: 2023-12-21

## 2024-06-26 RX ORDER — IBUPROFEN 800 MG/1
800 TABLET ORAL 3 TIMES DAILY PRN
COMMUNITY
Start: 2022-08-03

## 2024-06-26 RX ORDER — CALCIUM CARBONATE/VITAMIN D3 600 MG-10
TABLET ORAL
COMMUNITY
Start: 2020-04-02

## 2024-06-27 ENCOUNTER — OFFICE VISIT (OUTPATIENT)
Age: 79
End: 2024-06-27
Payer: MEDICARE

## 2024-06-27 VITALS
HEART RATE: 64 BPM | HEIGHT: 67 IN | BODY MASS INDEX: 25.39 KG/M2 | SYSTOLIC BLOOD PRESSURE: 141 MMHG | DIASTOLIC BLOOD PRESSURE: 77 MMHG | TEMPERATURE: 96.6 F | WEIGHT: 161.8 LBS | OXYGEN SATURATION: 99 %

## 2024-06-27 DIAGNOSIS — K75.4 AUTOIMMUNE HEPATITIS (HCC): Primary | ICD-10-CM

## 2024-06-27 PROCEDURE — 1123F ACP DISCUSS/DSCN MKR DOCD: CPT | Performed by: NURSE PRACTITIONER

## 2024-06-27 PROCEDURE — 91200 LIVER ELASTOGRAPHY: CPT | Performed by: NURSE PRACTITIONER

## 2024-06-27 PROCEDURE — 1036F TOBACCO NON-USER: CPT | Performed by: NURSE PRACTITIONER

## 2024-06-27 PROCEDURE — G8400 PT W/DXA NO RESULTS DOC: HCPCS | Performed by: NURSE PRACTITIONER

## 2024-06-27 PROCEDURE — G8419 CALC BMI OUT NRM PARAM NOF/U: HCPCS | Performed by: NURSE PRACTITIONER

## 2024-06-27 PROCEDURE — G8427 DOCREV CUR MEDS BY ELIG CLIN: HCPCS | Performed by: NURSE PRACTITIONER

## 2024-06-27 PROCEDURE — 1090F PRES/ABSN URINE INCON ASSESS: CPT | Performed by: NURSE PRACTITIONER

## 2024-06-27 PROCEDURE — 3077F SYST BP >= 140 MM HG: CPT | Performed by: NURSE PRACTITIONER

## 2024-06-27 PROCEDURE — 3078F DIAST BP <80 MM HG: CPT | Performed by: NURSE PRACTITIONER

## 2024-06-27 PROCEDURE — 99214 OFFICE O/P EST MOD 30 MIN: CPT | Performed by: NURSE PRACTITIONER

## 2024-06-27 NOTE — PROGRESS NOTES
[facility]      Valdemar Pringle MD, FACP, FACG, FAASLD      AUSTIN Walsh-GENO Sanchez, [facility]   Mile Peterssalvadornathan, [facility]   Melissa Jose, [facility]  [place], [facility]   Jessica Yang, [facility]   Marilia Benitez, [facility]   [address], [address]   [place]  [address] [phone]   FAX: [phone]  [facility]   [address], [address]   [place]  [address] [phone]   FAX: [phone]     Patient Care Team:  Vicente Baptiste MD as PCP - General (Internal Medicine Physician)    Patient Active Problem List   Diagnosis    Hypertension    Autoimmune hepatitis (HCC)     Keena Persaud returns to the [facility] for management of autoimmune hepatitis. The active problem list, all pertinent past medical history, medications, liver histology, radiologic findings, and laboratory findings related to the liver disorder were reviewed with the patient.      The patient is a 78 y.o.female who was first noted to have abnormalities in liver transaminases in 2012 and then developed more profound elevation in liver enzymes and jaundice while she was visiting family in [place].  She was hospitalized and transferred to [facility].      A liver biopsy was performed. The findings were consistent with severe AIH and she was treated with high dose prednisone and cellcept. Prednisone was eventually tapered off.  She remains on low dose cellcept.    The most recent imaging of the liver was Ultrasound performed in 12/2020. Results suggest chronic liver disease.      Assessment of liver fibrosis with Fibroscan was performed in the office today. The result was 3.5 kPa which correlates with no fibrosis. The CAP score of 249 does not suggest hepatic steatosis.    The patient

## 2024-08-26 ENCOUNTER — TELEPHONE (OUTPATIENT)
Age: 79
End: 2024-08-26

## 2024-08-26 DIAGNOSIS — K75.4 AUTOIMMUNE HEPATITIS (HCC): ICD-10-CM

## 2024-08-26 RX ORDER — MYCOPHENOLATE MOFETIL 250 MG/1
250 CAPSULE ORAL 2 TIMES DAILY
Qty: 180 CAPSULE | Refills: 3 | Status: SHIPPED | OUTPATIENT
Start: 2024-08-26

## 2024-09-17 RX ORDER — MYCOPHENOLATE MOFETIL 250 MG/1
250 CAPSULE ORAL 2 TIMES DAILY
Qty: 180 CAPSULE | Refills: 3 | Status: SHIPPED | OUTPATIENT
Start: 2024-09-17

## 2025-01-08 ENCOUNTER — OFFICE VISIT (OUTPATIENT)
Age: 80
End: 2025-01-08
Payer: MEDICARE

## 2025-01-08 VITALS
BODY MASS INDEX: 25.39 KG/M2 | WEIGHT: 161.8 LBS | SYSTOLIC BLOOD PRESSURE: 140 MMHG | DIASTOLIC BLOOD PRESSURE: 74 MMHG | OXYGEN SATURATION: 99 % | HEIGHT: 67 IN | TEMPERATURE: 97.4 F | HEART RATE: 76 BPM

## 2025-01-08 DIAGNOSIS — K75.4 AUTOIMMUNE HEPATITIS (HCC): ICD-10-CM

## 2025-01-08 DIAGNOSIS — K75.4 AUTOIMMUNE HEPATITIS (HCC): Primary | ICD-10-CM

## 2025-01-08 PROCEDURE — 3077F SYST BP >= 140 MM HG: CPT | Performed by: NURSE PRACTITIONER

## 2025-01-08 PROCEDURE — 1126F AMNT PAIN NOTED NONE PRSNT: CPT | Performed by: NURSE PRACTITIONER

## 2025-01-08 PROCEDURE — 99214 OFFICE O/P EST MOD 30 MIN: CPT | Performed by: NURSE PRACTITIONER

## 2025-01-08 PROCEDURE — 3078F DIAST BP <80 MM HG: CPT | Performed by: NURSE PRACTITIONER

## 2025-01-08 PROCEDURE — 1123F ACP DISCUSS/DSCN MKR DOCD: CPT | Performed by: NURSE PRACTITIONER

## 2025-01-08 RX ORDER — MYCOPHENOLATE MOFETIL 250 MG/1
250 CAPSULE ORAL 2 TIMES DAILY
Qty: 180 CAPSULE | Refills: 3 | Status: SHIPPED | OUTPATIENT
Start: 2025-01-08

## 2025-01-08 NOTE — PROGRESS NOTES
6/2024. FibroScan performed at [facility]. EkPa was 3.5. IQR/med 24%. . The results suggested a fibrosis level of F0. The CAP score suggests there is no significant hepatic steatosis.    ENDOSCOPIC PROCEDURES:  Not available or performed    RADIOLOGY:  12/2018.Ultrasound of liver. Echogenic consistent with chronic liver disease. No liver mass lesions. No dilated bile ducts. No ascites.  12/2019. Ultrasound of liver. Echogenic consistent with chronic liver disease. No liver mass lesions. No dilated bile ducts. No ascites.  6/2020.  Ultrasound of liver. Echogenic consistent with cirrhosis. No liver mass lesions. No dilated bile ducts. No ascites.  12/2020. Ultrasound of liver. Echogenic consistent with chronic liver disease. No liver mass lesions. No dilated bile ducts. No ascites.    OTHER TESTING:  Not available or performed    FOLLOW-UP:  All of the issues listed above in the Assessment and Plan were discussed with the patient.  All questions were answered.  The patient expressed a clear understanding of the above.    Follow-up [facility] in 6 months for routine monitoring which may include a Fibroscan.       LAMONT Wilkinson-BC  [facility]  [address], [address]  [place]  [address] [phone]

## 2025-02-10 DIAGNOSIS — K75.4 AUTOIMMUNE HEPATITIS (HCC): ICD-10-CM

## 2025-02-10 RX ORDER — MYCOPHENOLATE MOFETIL 250 MG/1
250 CAPSULE ORAL 2 TIMES DAILY
Qty: 180 CAPSULE | Refills: 3 | Status: SHIPPED | OUTPATIENT
Start: 2025-02-10

## 2025-05-02 ENCOUNTER — HOSPITAL ENCOUNTER (OUTPATIENT)
Dept: WOMENS IMAGING | Facility: HOSPITAL | Age: 80
Discharge: HOME | End: 2025-05-02
Payer: MEDICARE

## 2025-05-02 VITALS — BODY MASS INDEX: 24.33 KG/M2 | HEIGHT: 67 IN | WEIGHT: 155 LBS

## 2025-05-02 DIAGNOSIS — Z12.31 ENCOUNTER FOR SCREENING MAMMOGRAM FOR MALIGNANT NEOPLASM OF BREAST: ICD-10-CM

## 2025-05-02 PROCEDURE — 77063 BREAST TOMOSYNTHESIS BI: CPT

## 2025-07-28 ENCOUNTER — CLINICAL DOCUMENTATION (OUTPATIENT)
Age: 80
End: 2025-07-28

## 2025-07-28 NOTE — PROGRESS NOTES
Contacted the patient to let them know that we have lost providers in the [place] office in addition to closing [address]. We no longer have the capacity to be able to see the patient in [place]. Advised the patient to check with their primary care provider, referring provider, and/or their insurance company to see what other options might be.